# Patient Record
Sex: FEMALE | Race: WHITE | NOT HISPANIC OR LATINO | Employment: OTHER | URBAN - NONMETROPOLITAN AREA
[De-identification: names, ages, dates, MRNs, and addresses within clinical notes are randomized per-mention and may not be internally consistent; named-entity substitution may affect disease eponyms.]

---

## 2017-04-19 ENCOUNTER — TELEPHONE (OUTPATIENT)
Dept: INTERNAL MEDICINE | Facility: CLINIC | Age: 67
End: 2017-04-19

## 2017-04-19 RX ORDER — METOPROLOL SUCCINATE 100 MG/1
100 TABLET, EXTENDED RELEASE ORAL DAILY
Qty: 30 TABLET | Refills: 0 | Status: SHIPPED | OUTPATIENT
Start: 2017-04-19 | End: 2017-04-28 | Stop reason: SDUPTHER

## 2017-04-19 NOTE — TELEPHONE ENCOUNTER
----- Message from Sadie Watts sent at 4/19/2017  9:29 AM EDT -----  Contact: PT  PT CXLD HER APPT TODAY & RESCHEDULED FOR 05-16. SHE HAS ENOUGH TOPOROL  MG FOR April BUT NEEDS 16 TABS FOR MAY.    RX [facility]

## 2017-04-28 ENCOUNTER — TELEPHONE (OUTPATIENT)
Dept: INTERNAL MEDICINE | Facility: CLINIC | Age: 67
End: 2017-04-28

## 2017-04-28 RX ORDER — METOPROLOL SUCCINATE 100 MG/1
100 TABLET, EXTENDED RELEASE ORAL DAILY
Qty: 30 TABLET | Refills: 1 | Status: SHIPPED | OUTPATIENT
Start: 2017-04-28 | End: 2017-06-29 | Stop reason: SDUPTHER

## 2017-05-16 ENCOUNTER — OFFICE VISIT (OUTPATIENT)
Dept: INTERNAL MEDICINE | Facility: CLINIC | Age: 67
End: 2017-05-16

## 2017-05-16 VITALS
TEMPERATURE: 98.9 F | BODY MASS INDEX: 40.75 KG/M2 | HEART RATE: 70 BPM | WEIGHT: 230 LBS | HEIGHT: 63 IN | OXYGEN SATURATION: 97 %

## 2017-05-16 DIAGNOSIS — I10 ESSENTIAL HYPERTENSION: Primary | ICD-10-CM

## 2017-05-16 DIAGNOSIS — F41.8 ANXIETY ASSOCIATED WITH DEPRESSION: ICD-10-CM

## 2017-05-16 PROCEDURE — 99214 OFFICE O/P EST MOD 30 MIN: CPT | Performed by: INTERNAL MEDICINE

## 2017-05-16 RX ORDER — LOSARTAN POTASSIUM 50 MG/1
50 TABLET ORAL DAILY
Qty: 30 TABLET | Refills: 5 | Status: SHIPPED | OUTPATIENT
Start: 2017-05-16 | End: 2017-11-14 | Stop reason: SDUPTHER

## 2017-05-16 RX ORDER — BUSPIRONE HYDROCHLORIDE 15 MG/1
15 TABLET ORAL 3 TIMES DAILY
Qty: 90 TABLET | Refills: 2 | Status: SHIPPED | OUTPATIENT
Start: 2017-05-16 | End: 2017-07-05

## 2017-06-29 ENCOUNTER — TELEPHONE (OUTPATIENT)
Dept: INTERNAL MEDICINE | Facility: CLINIC | Age: 67
End: 2017-06-29

## 2017-06-29 RX ORDER — METOPROLOL SUCCINATE 100 MG/1
100 TABLET, EXTENDED RELEASE ORAL DAILY
Qty: 30 TABLET | Refills: 5 | Status: SHIPPED | OUTPATIENT
Start: 2017-06-29 | End: 2018-01-27 | Stop reason: SDUPTHER

## 2017-07-05 ENCOUNTER — OFFICE VISIT (OUTPATIENT)
Dept: INTERNAL MEDICINE | Facility: CLINIC | Age: 67
End: 2017-07-05

## 2017-07-05 VITALS
DIASTOLIC BLOOD PRESSURE: 108 MMHG | BODY MASS INDEX: 40.66 KG/M2 | HEART RATE: 81 BPM | WEIGHT: 229.5 LBS | OXYGEN SATURATION: 96 % | SYSTOLIC BLOOD PRESSURE: 176 MMHG | TEMPERATURE: 98.9 F | HEIGHT: 63 IN

## 2017-07-05 DIAGNOSIS — F41.8 ANXIETY ASSOCIATED WITH DEPRESSION: ICD-10-CM

## 2017-07-05 DIAGNOSIS — I10 ESSENTIAL HYPERTENSION: Primary | ICD-10-CM

## 2017-07-05 PROCEDURE — 99213 OFFICE O/P EST LOW 20 MIN: CPT | Performed by: INTERNAL MEDICINE

## 2017-07-05 RX ORDER — HYDROXYZINE HYDROCHLORIDE 25 MG/1
25 TABLET, FILM COATED ORAL EVERY 8 HOURS PRN
Qty: 60 TABLET | Refills: 3 | Status: SHIPPED | OUTPATIENT
Start: 2017-07-05 | End: 2018-09-24 | Stop reason: SDUPTHER

## 2017-07-05 NOTE — PROGRESS NOTES
"Chief Complaint   Patient presents with   • Follow-up     1 month for HTN. Pt states taking Buspar daily makes her mind race and makes her mad at times. Pt also states her head itches.      Subjective   Crystal Ragsdale is a 66 y.o. female.     HPI Comments: Here for followup of HTN and anxiety.  Pt complains that the buspar causes anger and makes her mind race more.   It does help decrease her anxiety.   She has good and bad nights for sleep.   She has seen counselor several times, has appt in August again.   She does not check her BP regularly, makes her too nervous.   She denies HA, vision changes, CP, SOB, edema.  No palpitations.   Has been having some gas problems.   Has been taking both BP meds regularly.  CBC and CMP were normal in March at oncology appt.        The following portions of the patient's history were reviewed and updated as appropriate: allergies, current medications, past family history, past medical history, past social history, past surgical history and problem list.    Review of Systems   Eyes: Negative for visual disturbance.   Respiratory: Negative for shortness of breath.    Cardiovascular: Negative for chest pain, palpitations and leg swelling.   Skin: Negative for rash.   Allergic/Immunologic: Negative for environmental allergies.   Psychiatric/Behavioral: Negative for dysphoric mood and sleep disturbance. The patient is nervous/anxious.    All other systems reviewed and are negative.      Objective   BP (!) 176/108  Pulse 81  Temp 98.9 °F (37.2 °C)  Ht 63\" (160 cm)  Wt 229 lb 8 oz (104 kg)  SpO2 96%  BMI 40.65 kg/m2  Body mass index is 40.65 kg/(m^2).  Physical Exam   Constitutional: She is oriented to person, place, and time. She appears well-developed and well-nourished.   Pleasant female, morbidly obese, appears anxious but in no distress   HENT:   Head: Normocephalic and atraumatic.   Mouth/Throat: Oropharynx is clear and moist.   Eyes: Conjunctivae and EOM are normal. Pupils are " equal, round, and reactive to light.   Neck: Normal range of motion. Neck supple. No thyromegaly present.   Cardiovascular: Normal rate, regular rhythm, normal heart sounds and intact distal pulses.    No murmur heard.  Pulmonary/Chest: Effort normal and breath sounds normal. No respiratory distress.   Abdominal: Soft. Bowel sounds are normal.   Musculoskeletal: She exhibits no edema.   Lymphadenopathy:     She has no cervical adenopathy.   Neurological: She is alert and oriented to person, place, and time. No cranial nerve deficit.   Skin: No rash noted.   Psychiatric: Judgment normal.   Patient appears anxious, does not appear depressed   Nursing note and vitals reviewed.      Assessment/Plan   Crystal Ragsdale is here today and the following problems have been addressed:      Crystal was seen today for follow-up.    Diagnoses and all orders for this visit:    Essential hypertension    Anxiety associated with depression    Other orders  -     hydrOXYzine (ATARAX) 25 MG tablet; Take 1 tablet by mouth Every 8 (Eight) Hours As Needed for Itching or Anxiety.    Follow heart healthy/low salt diet  Avoid processed foods  Monitor blood pressure as discussed-encouraged her to try to check blood pressure on occasion  Exercise as tolerated up to 30 minutes 5 days per week  Take all medications as prescribed  Given hydroxyzine for anxiety and sleep  Discontinued buspar due to side effect  Encouraged pt to see counselor as scheduled    RTC 3 mo, labs then  Please note that portions of this note were completed with a voice recognition program.  Efforts were made to edit dictation, but occasionally words are mistranscribed.

## 2017-09-25 ENCOUNTER — OFFICE VISIT (OUTPATIENT)
Dept: PSYCHIATRY | Facility: CLINIC | Age: 67
End: 2017-09-25

## 2017-09-25 DIAGNOSIS — F41.1 GENERALIZED ANXIETY DISORDER: Primary | ICD-10-CM

## 2017-09-25 DIAGNOSIS — R45.89 DYSPHORIC MOOD: ICD-10-CM

## 2017-09-25 PROCEDURE — 90834 PSYTX W PT 45 MINUTES: CPT | Performed by: SOCIAL WORKER
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
## 2017-10-05 ENCOUNTER — OFFICE VISIT (OUTPATIENT)
Dept: INTERNAL MEDICINE | Facility: CLINIC | Age: 67
End: 2017-10-05

## 2017-10-05 VITALS
BODY MASS INDEX: 40.4 KG/M2 | OXYGEN SATURATION: 97 % | HEART RATE: 72 BPM | TEMPERATURE: 98.8 F | WEIGHT: 228 LBS | HEIGHT: 63 IN | DIASTOLIC BLOOD PRESSURE: 102 MMHG | SYSTOLIC BLOOD PRESSURE: 178 MMHG

## 2017-10-05 DIAGNOSIS — F41.8 ANXIETY ASSOCIATED WITH DEPRESSION: ICD-10-CM

## 2017-10-05 DIAGNOSIS — I10 ESSENTIAL HYPERTENSION: Primary | ICD-10-CM

## 2017-10-05 PROCEDURE — 99214 OFFICE O/P EST MOD 30 MIN: CPT | Performed by: INTERNAL MEDICINE

## 2017-10-05 NOTE — PROGRESS NOTES
"Chief Complaint   Patient presents with   • Follow-up     3 months for HTN, anxiety, and depression. No new complaints.      Subjective   Crystal Ragsdale is a 67 y.o. female.     HPI Comments: Here for followup of HTN and anxiety.  Was given hydroxyzine last visit to use prn for anxiety.  She says it makes her sleepy, but it did not help her sleep at night?  She has been seeing the counselor.  They want her to get involved with other people.   She denies chest pain, shortness of breath or edema.  States she is compliant with blood pressure medication.  She does not want to take a daily medication for her chronic underlying severe anxiety disorder.             The following portions of the patient's history were reviewed and updated as appropriate: allergies, current medications, past family history, past medical history, past social history, past surgical history and problem list.    Review of Systems   Respiratory: Negative for shortness of breath.    Cardiovascular: Negative for chest pain, palpitations and leg swelling.   Psychiatric/Behavioral: The patient is nervous/anxious.    All other systems reviewed and are negative.      Objective   BP (!) 178/102  Pulse 72  Temp 98.8 °F (37.1 °C)  Ht 63\" (160 cm)  Wt 228 lb (103 kg)  SpO2 97%  BMI 40.39 kg/m2  Body mass index is 40.39 kg/(m^2).  Physical Exam   Constitutional: She is oriented to person, place, and time. She appears well-developed and well-nourished.   Very anxious female, no apparent distress, morbidly obese   HENT:   Head: Normocephalic and atraumatic.   Mouth/Throat: Oropharynx is clear and moist.   Eyes: Conjunctivae and EOM are normal. Pupils are equal, round, and reactive to light.   Neck: Normal range of motion. Neck supple. No thyromegaly present.   Cardiovascular: Normal rate, regular rhythm, normal heart sounds and intact distal pulses.    No murmur heard.  Pulmonary/Chest: Effort normal and breath sounds normal. No respiratory distress. "   Musculoskeletal: She exhibits no edema.   Lymphadenopathy:     She has no cervical adenopathy.   Neurological: She is alert and oriented to person, place, and time. No cranial nerve deficit.   Psychiatric: Judgment normal.   Rapid speech, anxious   Nursing note and vitals reviewed.      Assessment/Plan   Crystal Ragsdale is here today and the following problems have been addressed:      Crystal was seen today for follow-up.    Diagnoses and all orders for this visit:    Essential hypertension  -     CBC & Differential  -     Comprehensive Metabolic Panel  -     Lipid Panel    Anxiety associated with depression  -     Cancel: T4, Free  -     Cancel: TSH    Other orders  -     CloNIDine (CATAPRES-TTS) 0.1 MG/24HR patch; Place 1 patch on the skin 1 (One) Time Per Week.    Follow heart healthy/low salt diet  Avoid processed foods  Monitor blood pressure as discussed  Exercise as tolerated up to 30 minutes 5 days per week  Take all medications as prescribed  Start catapress-1 patch and change q week, hopefully this will help blood pressure and anxiety  Labs as noted  Continue prn hydroxyzine for sleep and/or anxiety  Continue regular counseling for anxiety    RTC 2 weeks    Please note that portions of this note were completed with a voice recognition program.  Efforts were made to edit dictation, but occasionally words are mistranscribed.

## 2017-10-19 ENCOUNTER — OFFICE VISIT (OUTPATIENT)
Dept: INTERNAL MEDICINE | Facility: CLINIC | Age: 67
End: 2017-10-19

## 2017-10-19 VITALS
HEART RATE: 78 BPM | SYSTOLIC BLOOD PRESSURE: 168 MMHG | OXYGEN SATURATION: 96 % | TEMPERATURE: 98.4 F | DIASTOLIC BLOOD PRESSURE: 100 MMHG

## 2017-10-19 DIAGNOSIS — I10 ESSENTIAL HYPERTENSION: Primary | ICD-10-CM

## 2017-10-19 DIAGNOSIS — F41.8 ANXIETY ASSOCIATED WITH DEPRESSION: ICD-10-CM

## 2017-10-19 PROCEDURE — 99213 OFFICE O/P EST LOW 20 MIN: CPT | Performed by: INTERNAL MEDICINE

## 2017-10-19 NOTE — PROGRESS NOTES
Chief Complaint   Patient presents with   • Follow-up     2 weeks for anxiety and HTN.      Subjective   Crystal Ragsdale is a 67 y.o. female.     HPI Comments: Here for followup of HTN and anxiety.   Last visit we started a catapress-1 patch in hopes it would help her BP and anxiety.   Her BP remains elevated today. She is having a hard time keeping patch on, falls off when wet.   It is helping her anxiety.  Even family and friends have noticed a difference in her anxiety.  She has not checked her BP at home, it still makes her too anxious.  She denies any chest pain or shortness of breath.  No edema.       The following portions of the patient's history were reviewed and updated as appropriate: allergies, current medications, past family history, past medical history, past social history, past surgical history and problem list.    Review of Systems   Respiratory: Negative for shortness of breath.    Cardiovascular: Negative for chest pain, palpitations and leg swelling.   Psychiatric/Behavioral:        Less anxiety       Objective   /100  Pulse 78  Temp 98.4 °F (36.9 °C)  SpO2 96%  There is no height or weight on file to calculate BMI.  Physical Exam   Constitutional: She is oriented to person, place, and time. She appears well-developed and well-nourished.   HENT:   Head: Normocephalic and atraumatic.   Cardiovascular: Normal rate, regular rhythm and normal heart sounds.    No murmur heard.  Pulmonary/Chest: Effort normal and breath sounds normal.   Neurological: She is alert and oriented to person, place, and time.   Psychiatric: She has a normal mood and affect. Judgment normal.   Nursing note and vitals reviewed.      Assessment/Plan   Crystal Ragsdale is here today and the following problems have been addressed:      Crystal was seen today for follow-up.    Diagnoses and all orders for this visit:    Essential hypertension    Anxiety associated with depression    Follow heart healthy/low salt diet  Avoid  processed foods  Monitor blood pressure on occasion  Exercise as tolerated up to 30 minutes 5 days per week  Take all medications as prescribed  She is feeling much better with catapress-1 patch, although BP not yet down.  May consider increase in patch next visit if BP not yet decreased  Her anxiety is much improved    RTC one mo    Please note that portions of this note were completed with a voice recognition program.  Efforts were made to edit dictation, but occasionally words are mistranscribed.

## 2017-11-02 LAB
ALBUMIN SERPL-MCNC: 3.8 G/DL (ref 3.5–5)
ALBUMIN/GLOB SERPL: 1.3 G/DL (ref 1–2)
ALP SERPL-CCNC: 110 U/L (ref 38–126)
ALT SERPL-CCNC: 21 U/L (ref 13–69)
AST SERPL-CCNC: 17 U/L (ref 15–46)
BASOPHILS # BLD AUTO: 0.04 10*3/MM3 (ref 0–0.2)
BASOPHILS NFR BLD AUTO: 0.5 % (ref 0–2.5)
BILIRUB SERPL-MCNC: 0.5 MG/DL (ref 0.2–1.3)
BUN SERPL-MCNC: 12 MG/DL (ref 7–20)
BUN/CREAT SERPL: 17.1 (ref 7.1–23.5)
CALCIUM SERPL-MCNC: 9.3 MG/DL (ref 8.4–10.2)
CHLORIDE SERPL-SCNC: 104 MMOL/L (ref 98–107)
CHOLEST SERPL-MCNC: 130 MG/DL (ref 0–199)
CO2 SERPL-SCNC: 27 MMOL/L (ref 26–30)
CREAT SERPL-MCNC: 0.7 MG/DL (ref 0.6–1.3)
EOSINOPHIL # BLD AUTO: 0.03 10*3/MM3 (ref 0–0.7)
EOSINOPHIL NFR BLD AUTO: 0.4 % (ref 0–7)
ERYTHROCYTE [DISTWIDTH] IN BLOOD BY AUTOMATED COUNT: 13.1 % (ref 11.5–14.5)
GFR SERPLBLD CREATININE-BSD FMLA CKD-EPI: 101 ML/MIN/1.73
GFR SERPLBLD CREATININE-BSD FMLA CKD-EPI: 84 ML/MIN/1.73
GLOBULIN SER CALC-MCNC: 3 GM/DL
GLUCOSE SERPL-MCNC: 118 MG/DL (ref 74–98)
HCT VFR BLD AUTO: 46.7 % (ref 37–47)
HDLC SERPL-MCNC: 33 MG/DL (ref 40–60)
HGB BLD-MCNC: 16.2 G/DL (ref 12–16)
IMM GRANULOCYTES # BLD: 0.01 10*3/MM3 (ref 0–0.06)
IMM GRANULOCYTES NFR BLD: 0.1 % (ref 0–0.6)
LDLC SERPL CALC-MCNC: 75 MG/DL (ref 0–99)
LYMPHOCYTES # BLD AUTO: 2.6 10*3/MM3 (ref 0.6–3.4)
LYMPHOCYTES NFR BLD AUTO: 35.4 % (ref 10–50)
MCH RBC QN AUTO: 32.1 PG (ref 27–31)
MCHC RBC AUTO-ENTMCNC: 34.7 G/DL (ref 30–37)
MCV RBC AUTO: 92.5 FL (ref 81–99)
MONOCYTES # BLD AUTO: 0.41 10*3/MM3 (ref 0–0.9)
MONOCYTES NFR BLD AUTO: 5.6 % (ref 0–12)
NEUTROPHILS # BLD AUTO: 4.26 10*3/MM3 (ref 2–6.9)
NEUTROPHILS NFR BLD AUTO: 58 % (ref 37–80)
NRBC BLD AUTO-RTO: 0 /100 WBC (ref 0–0)
PLATELET # BLD AUTO: 273 10*3/MM3 (ref 130–400)
POTASSIUM SERPL-SCNC: 3.9 MMOL/L (ref 3.5–5.1)
PROT SERPL-MCNC: 6.8 G/DL (ref 6.3–8.2)
RBC # BLD AUTO: 5.05 10*6/MM3 (ref 4.2–5.4)
SODIUM SERPL-SCNC: 142 MMOL/L (ref 137–145)
TRIGL SERPL-MCNC: 109 MG/DL
VLDLC SERPL CALC-MCNC: 21.8 MG/DL
WBC # BLD AUTO: 7.35 10*3/MM3 (ref 4.8–10.8)

## 2017-11-06 ENCOUNTER — TELEPHONE (OUTPATIENT)
Dept: INTERNAL MEDICINE | Facility: CLINIC | Age: 67
End: 2017-11-06

## 2017-11-06 NOTE — TELEPHONE ENCOUNTER
----- Message from Marilyn K Vermeesch, MD sent at 11/6/2017  7:36 AM EST -----  Please tell patient that all of her labs look good except an elevated blood sugar.  Please tell her I did a diabetic test and it was elevated at 5.9 suggesting an average blood sugar of 120.  Normal blood sugar is less than 100.  This places her in t  he range of prediabetes.  Please tell her that it is important to avoid sweets, pop, and decrease the amount of bread, pasta, rice and potatoes she is eating.  It is important that she try to do exercise for 30 minutes 5 days a week such as walking.    If she can work on weight loss this would be helpful.  Please give her the information for the health department prediabetes or diabetes classes.  We will repeat these labs in approximately 4 months.

## 2017-11-08 ENCOUNTER — OFFICE VISIT (OUTPATIENT)
Dept: PSYCHIATRY | Facility: CLINIC | Age: 67
End: 2017-11-08

## 2017-11-08 DIAGNOSIS — R45.89 DYSPHORIC MOOD: ICD-10-CM

## 2017-11-08 DIAGNOSIS — F41.1 GENERALIZED ANXIETY DISORDER: Primary | ICD-10-CM

## 2017-11-08 LAB
HBA1C MFR BLD: 5.9 %
WRITTEN AUTHORIZATION: NORMAL

## 2017-11-08 PROCEDURE — 90834 PSYTX W PT 45 MINUTES: CPT | Performed by: SOCIAL WORKER

## 2017-11-08 NOTE — PROGRESS NOTES
"Date of Service: 11/8/17  Time In: 145  Time Out: 230      PROGRESS NOTE  Data:  Crystal Ragsdale is a 67 y.o. female who met with the undersigned for a regularly scheduled individual outpatient psychotherapy session at  [facility]. Patient started individual psychotherapy session by reporting \"my sugar is bad\". patient discussed her doctor has changed her diet to \"no pop, no potatoes, no white anything and no bread\". Patient discussed the difficulties and states \"I normally drink coffee and then i drink a soda every morning\". Patient states \"I drink pepsi\". Patient discussed she loves breads and it will be difficult to not have white sugar. patient states \"im on blood pressure medicine\" and \"it makes me so tired all the time\". Patient discussed she got have a weekend with her 1st cousins and throuoghly enjoyed it. Patient states \"sometimes i get jealous of people\". Patient discussed she wishes she had someone to be with i.e. Relationship. Patient discussed her childhood after this therapist asked her many questions about her low self esteem and lack of confidence.     HPI: Depression, anxiety,      Clinical Maneuvering/Intervention:  Assisted patient in processing above session content; acknowledged and normalized patient’s thoughts, feelings, and concerns. Assisted patient in processing her feelings of continued anxiety and feeling overwhelmed.  Encouraged pt the importance of keeping all appointments and taking medications as prescribed and calling with any questions or concerns but patient explained having difficulty finding medication that doesn't cause negative side effects.  Assisted patient in understanding the importance of seeing the interdisciplinary team for continuity of care.  Patient complains of side effects of a blood pressure pill. Patient to discuss with her PCP.  Patient to work on relaxation skills and techniques. Patient denies si and hi. Assisted patient in " processing her lack of confidence, low self esteem and people pleasing. Encouraged patient to work on not putting expectation on others and trying to not focus on what others try to put on her. Encouraged patient to focus on what makes her happy and self love. Educated patient to work on gratitude and finding things she likes about herself everyday and saying them to herself as a way to build her esteem. Patient is agreeable.       Allowed patient to freely discuss issues without interruption or judgment. Provided safe, confidential environment to facilitate the development of positive therapeutic relationship and encourage open, honest communication. Assisted patient in identifying risk factors which would indicate the need for higher level of care including thoughts to harm self or others and/or self-harming behavior and encouraged patient to contact this office, call 911, or present to the nearest emergency room should any of these events occur. Discussed crisis intervention services and means to access.  Patient adamantly and convincingly denies current suicidal or homicidal ideation or perceptual disturbance.    Assessment     Diagnoses and all orders for this visit:    Generalized anxiety disorder    Dysphoric mood             Mental Status Exam  Hygiene:  good  Dress:  casual  Attitude:  Guarded  Motor Activity:  Restless  Speech:  Rapid  Mood:  anxious  Affect:  depressed and anxious  Thought Processes:  Goal directed  Thought Content:  normal  Suicidal Thoughts:  denies  Homicidal Thoughts:  denies  Crisis Safety Plan: yes, to come to the emergency room.  Hallucinations:  denies    Patient's Support Network Includes:  extended family    Progress toward goal: Not at goal    Functional Status: Moderate impairment     Prognosis: Guarded with Ongoing Treatment    Plan         Patient will adhere to medication regimen as prescribed and report any side effects. Patient will contact this office, call 911 or  present to the nearest emergency room should suicidal or homicidal ideations occur. Provide Cognitive Behavioral Therapy and Integrative Therapy to improve functioning, maintain stability, and avoid decompensation and the need for higher level of care.          Return in about 1 month (around 12/8/2017), or if symptoms worsen or fail to improve.      This document signed by Jennifer Matamoros LCSW, November 8, 2017 5:19 PM

## 2017-11-14 RX ORDER — LOSARTAN POTASSIUM 50 MG/1
TABLET ORAL
Qty: 30 TABLET | Refills: 5 | Status: SHIPPED | OUTPATIENT
Start: 2017-11-14 | End: 2018-04-25 | Stop reason: SDUPTHER

## 2017-12-05 ENCOUNTER — OFFICE VISIT (OUTPATIENT)
Dept: INTERNAL MEDICINE | Facility: CLINIC | Age: 67
End: 2017-12-05

## 2017-12-05 VITALS
DIASTOLIC BLOOD PRESSURE: 90 MMHG | OXYGEN SATURATION: 95 % | WEIGHT: 228 LBS | TEMPERATURE: 98.6 F | HEART RATE: 70 BPM | BODY MASS INDEX: 40.4 KG/M2 | HEIGHT: 63 IN | SYSTOLIC BLOOD PRESSURE: 152 MMHG

## 2017-12-05 DIAGNOSIS — F41.8 ANXIETY ASSOCIATED WITH DEPRESSION: ICD-10-CM

## 2017-12-05 DIAGNOSIS — R73.9 HYPERGLYCEMIA: ICD-10-CM

## 2017-12-05 DIAGNOSIS — I10 ESSENTIAL HYPERTENSION: Primary | ICD-10-CM

## 2017-12-05 PROCEDURE — 99213 OFFICE O/P EST LOW 20 MIN: CPT | Performed by: INTERNAL MEDICINE

## 2017-12-05 NOTE — PROGRESS NOTES
"Chief Complaint   Patient presents with   • Follow-up     2 months for depression and HTN.      Subjective   Crystal Ragsdale is a 67 y.o. female.     HPI Comments: Here for followup of anxiety and HTN.   Her BP is slowly improving with the catapres patch.  No CP, SOB, edema or palpitations.  She does fatigue easily.   Her anxiety is doing better overall.  She is seeing her counselor once a month.  It has been helpful.  She does get agitated easily at times.   She has cut back on the amount of hydroxyzine she is using for anxiety.    She is working on her BS due to elevated A1c of 5.9.  Has cut back on how much sugar she is using in her tea.  Has changed to wheat bread and sweet potatoes.    She is trying to walk more often.  Her wt is unchanged.     Overall she has no complaints today and feels much better with use of clonidine patch.           The following portions of the patient's history were reviewed and updated as appropriate: allergies, current medications, past family history, past medical history, past social history, past surgical history and problem list.    Review of Systems   Constitutional: Positive for fatigue.   Respiratory: Negative for shortness of breath.    Cardiovascular: Negative for chest pain, palpitations and leg swelling.   Psychiatric/Behavioral: Positive for agitation. The patient is nervous/anxious.    All other systems reviewed and are negative.      Objective   /90  Pulse 70  Temp 98.6 °F (37 °C)  Ht 160 cm (63\")  Wt 103 kg (228 lb)  SpO2 95%  BMI 40.39 kg/m2  Body mass index is 40.39 kg/(m^2).  Physical Exam   Constitutional: She is oriented to person, place, and time. She appears well-developed and well-nourished.   HENT:   Head: Normocephalic and atraumatic.   Mouth/Throat: Oropharynx is clear and moist.   Eyes: Conjunctivae and EOM are normal. Pupils are equal, round, and reactive to light.   Neck: Normal range of motion. Neck supple. No thyromegaly present. "   Cardiovascular: Normal rate, regular rhythm, normal heart sounds and intact distal pulses.    No murmur heard.  Pulmonary/Chest: Effort normal and breath sounds normal. No respiratory distress.   Musculoskeletal: She exhibits no edema.   Lymphadenopathy:     She has no cervical adenopathy.   Neurological: She is alert and oriented to person, place, and time. No cranial nerve deficit.   Psychiatric: She has a normal mood and affect. Judgment normal.   Nursing note and vitals reviewed.      Assessment/Plan   Crystal Ragsdale is here today and the following problems have been addressed:      Crystal was seen today for follow-up.    Diagnoses and all orders for this visit:    Essential hypertension    Anxiety associated with depression    Hyperglycemia    Other orders  -     CloNIDine (CATAPRES-TTS-2) 0.2 MG/24HR patch; Place 1 patch on the skin 1 (One) Time Per Week    Follow heart healthy/low salt diet  Avoid processed foods  Monitor blood pressure as discussed  Exercise as tolerated up to 30 minutes 5 days per week  Take all medications as prescribed  Increase catapres patch to TTS-2 and change once a week  Continue diabetic diet, decrease portion sizes, increase exercise    RTC 2 mo, labs then    Please note that portions of this note were completed with a voice recognition program.  Efforts were made to edit dictation, but occasionally words are mistranscribed.

## 2017-12-22 ENCOUNTER — TELEPHONE (OUTPATIENT)
Dept: INTERNAL MEDICINE | Facility: CLINIC | Age: 67
End: 2017-12-22

## 2017-12-22 NOTE — TELEPHONE ENCOUNTER
Apply hydrocortisone 1% cream to skin rub in well wait 15-20 minutes and then apply blood pressure patch.

## 2018-01-29 RX ORDER — METOPROLOL SUCCINATE 100 MG/1
TABLET, EXTENDED RELEASE ORAL
Qty: 30 TABLET | Refills: 5 | Status: SHIPPED | OUTPATIENT
Start: 2018-01-29 | End: 2018-02-15 | Stop reason: SDUPTHER

## 2018-01-29 RX ORDER — METOPROLOL SUCCINATE 100 MG/1
100 TABLET, EXTENDED RELEASE ORAL DAILY
Qty: 30 TABLET | Refills: 5 | Status: SHIPPED | OUTPATIENT
Start: 2018-01-29 | End: 2018-02-15

## 2018-02-15 ENCOUNTER — OFFICE VISIT (OUTPATIENT)
Dept: INTERNAL MEDICINE | Facility: CLINIC | Age: 68
End: 2018-02-15

## 2018-02-15 VITALS
DIASTOLIC BLOOD PRESSURE: 88 MMHG | HEIGHT: 63 IN | HEART RATE: 79 BPM | WEIGHT: 225 LBS | OXYGEN SATURATION: 95 % | BODY MASS INDEX: 39.87 KG/M2 | TEMPERATURE: 98.9 F | SYSTOLIC BLOOD PRESSURE: 150 MMHG

## 2018-02-15 DIAGNOSIS — I10 ESSENTIAL HYPERTENSION: Primary | ICD-10-CM

## 2018-02-15 DIAGNOSIS — F41.8 ANXIETY ASSOCIATED WITH DEPRESSION: ICD-10-CM

## 2018-02-15 DIAGNOSIS — R73.9 HYPERGLYCEMIA: ICD-10-CM

## 2018-02-15 PROCEDURE — 99213 OFFICE O/P EST LOW 20 MIN: CPT | Performed by: INTERNAL MEDICINE

## 2018-02-15 RX ORDER — CARVEDILOL 25 MG/1
25 TABLET ORAL 2 TIMES DAILY WITH MEALS
Qty: 60 TABLET | Refills: 11 | Status: SHIPPED | OUTPATIENT
Start: 2018-02-15 | End: 2018-12-28 | Stop reason: SDUPTHER

## 2018-02-15 NOTE — PROGRESS NOTES
"Chief Complaint   Patient presents with   • Follow-up     2 months for HTN and anxiety with depression. No new complaints.      Subjective   Crystal Ragsdale is a 67 y.o. female.     HPI Comments: Here for follow up of HTN, HLD, depression/anxiety and preDM.  Last visit we increased her clonidine patch to a .2mg weekly dose.   Her BP is unchanged today.  She is too anxious to check it at home.  She missed last few months of counseling due to cancelations by counselor.   Has been more anxious lately.  She tries to get out more as this is helpful.   Has cut back on her carbs and sweets.  Her wt is down another few lbs.  Not exercising much.  Has not been to eye doctor in several yrs.  She denies NTB of feet.   No CP, SOB, edema or edema.   She has an appt with Dr Hare next month.  Her mammogram will be done just before that.        The following portions of the patient's history were reviewed and updated as appropriate: allergies, current medications, past family history, past medical history, past social history, past surgical history and problem list.    Review of Systems   Respiratory: Negative for shortness of breath.    Cardiovascular: Negative for chest pain, palpitations and leg swelling.   Psychiatric/Behavioral: Negative for dysphoric mood and sleep disturbance. The patient is nervous/anxious.    All other systems reviewed and are negative.      Objective   /88  Pulse 79  Temp 98.9 °F (37.2 °C)  Ht 160 cm (63\")  Wt 102 kg (225 lb)  SpO2 95%  BMI 39.86 kg/m2  Body mass index is 39.86 kg/(m^2).  Physical Exam   Constitutional: She is oriented to person, place, and time. She appears well-developed and well-nourished.   HENT:   Head: Normocephalic and atraumatic.   Mouth/Throat: Oropharynx is clear and moist.   Eyes: Conjunctivae and EOM are normal. Pupils are equal, round, and reactive to light.   Neck: Normal range of motion. Neck supple. No thyromegaly present.   Cardiovascular: Normal rate, " regular rhythm, normal heart sounds and intact distal pulses.    No murmur heard.  Pulmonary/Chest: Effort normal and breath sounds normal. No respiratory distress.   Musculoskeletal: She exhibits no edema.   Lymphadenopathy:     She has no cervical adenopathy.   Neurological: She is alert and oriented to person, place, and time. No cranial nerve deficit.   Psychiatric: Judgment normal.   Slightly anxious today   Nursing note and vitals reviewed.      Assessment/Plan   Crystal Ragsdale is here today and the following problems have been addressed:      Crystal was seen today for follow-up.    Diagnoses and all orders for this visit:    Essential hypertension  -     Basic Metabolic Panel    Anxiety associated with depression    Hyperglycemia  -     Basic Metabolic Panel  -     Hemoglobin A1c    Other orders  -     carvedilol (COREG) 25 MG tablet; Take 1 tablet by mouth 2 (Two) Times a Day With Meals.    Follow heart healthy/low salt/diabetic diet  Avoid processed foods  Monitor blood pressure as discussed  Exercise as tolerated up to 30 minutes 5 days per week  Take all medications as prescribed  Labs as noted  Discontinue metoprolol and change to coreg 25 mg BID  Continue to see counselor    RTC 2 mo to follow up BP    Please note that portions of this note were completed with a voice recognition program.  Efforts were made to edit dictation, but occasionally words are mistranscribed.

## 2018-02-16 LAB
BUN SERPL-MCNC: 13 MG/DL (ref 7–20)
BUN/CREAT SERPL: 18.6 (ref 7.1–23.5)
CALCIUM SERPL-MCNC: 10 MG/DL (ref 8.4–10.2)
CHLORIDE SERPL-SCNC: 105 MMOL/L (ref 98–107)
CO2 SERPL-SCNC: 27 MMOL/L (ref 26–30)
CREAT SERPL-MCNC: 0.7 MG/DL (ref 0.6–1.3)
GFR SERPLBLD CREATININE-BSD FMLA CKD-EPI: 101 ML/MIN/1.73
GFR SERPLBLD CREATININE-BSD FMLA CKD-EPI: 84 ML/MIN/1.73
GLUCOSE SERPL-MCNC: 120 MG/DL (ref 74–98)
HBA1C MFR BLD: 5.8 %
POTASSIUM SERPL-SCNC: 3.9 MMOL/L (ref 3.5–5.1)
SODIUM SERPL-SCNC: 146 MMOL/L (ref 137–145)

## 2018-02-19 ENCOUNTER — OFFICE VISIT (OUTPATIENT)
Dept: PSYCHIATRY | Facility: CLINIC | Age: 68
End: 2018-02-19

## 2018-02-19 DIAGNOSIS — R45.89 DYSPHORIC MOOD: ICD-10-CM

## 2018-02-19 DIAGNOSIS — F41.1 GENERALIZED ANXIETY DISORDER: Primary | ICD-10-CM

## 2018-02-19 PROCEDURE — 90834 PSYTX W PT 45 MINUTES: CPT | Performed by: SOCIAL WORKER

## 2018-04-25 RX ORDER — LOSARTAN POTASSIUM 50 MG/1
50 TABLET ORAL DAILY
Qty: 30 TABLET | Refills: 5 | Status: SHIPPED | OUTPATIENT
Start: 2018-04-25 | End: 2018-11-11 | Stop reason: SDUPTHER

## 2018-04-30 ENCOUNTER — OFFICE VISIT (OUTPATIENT)
Dept: PSYCHIATRY | Facility: CLINIC | Age: 68
End: 2018-04-30

## 2018-04-30 DIAGNOSIS — F41.1 GENERALIZED ANXIETY DISORDER: Primary | ICD-10-CM

## 2018-04-30 DIAGNOSIS — R45.89 DYSPHORIC MOOD: ICD-10-CM

## 2018-04-30 PROCEDURE — 90834 PSYTX W PT 45 MINUTES: CPT | Performed by: SOCIAL WORKER

## 2018-05-01 NOTE — PROGRESS NOTES
"Date of Service: 4/30/18  Time In: 345  Time Out: 430      PROGRESS NOTE  Data:  Crystal Ragsdale is a 67 y.o. female who met with the undersigned for a regularly scheduled individual outpatient psychotherapy session at  [facility]. Patient started individual psychotherapy session by reporting \"well I'm still alone\".  Patient discussed she is trying to cope with being alone and living alone better.  Patient discussed her grandson stayed a week with her over his spring break.  Patient discussed what they did in the family had during that time.  Patient discussed a side effect of her blood pressure pills being fatigue and lack of energy.  Patient discussed some day she doesn't feel like doing much or gets tired easily.  Patient states \"I joined the [employer] club\".  Patient engage in a discussion about the [employer] group/club that she has joined and having activities to do.  Patient states why does everything have to be revolved around food\".  Patient explains that she has irritable bowel syndrome and is afraid to eat out in public as she may have diarrhea and accidents if she eats something that doesn't settle well with her stomach.  Patient discussed her daughter coming for a visit and it was agreed time with no arguments or uncomfortable conversations which is new for them.  Patient discussed she is working on self-esteem and self-worth.  Patient gave an example of not being invited to her aunt and uncle 65th wedding anniversary and having her feelings hurt that she was not invited.    HPI: Depression, anxiety, anxiety when [place] the [place] and avoids it.     Clinical Maneuvering/Intervention:  Assisted patient in processing above session content; acknowledged and normalized patient’s thoughts, feelings, and concerns. Assisted patient in processing her feelings of continued anxiety. Assisted patient in processing her thoughts and feelings about her spending the " weekend with her daughter and having her grandson here for a week.   Encouraged pt the importance of keeping all appointments and taking medications as prescribed and calling with any questions or concerns but patient explained having difficulty finding medication that doesn't cause negative side effects.  Assisted patient in understanding the importance of seeing the interdisciplinary team for continuity of care.  Patient complains of side effects of a blood pressure pill. Patient to discuss with her PCP.  Patient to work on relaxation skills .   Patient denies si and hi. Assisted patient in processing her lack of confidence, low self esteem and people pleasing and was able to give examples of how she is working on self-esteem and confidence.  Gave encouragement to patient that joined a group as that was one of her goals.  Patient continued to find outings and hobbies.  Educated on irrational thinking and fears and ways to use cognitive behavior therapy to work on them    Allowed patient to freely discuss issues without interruption or judgment. Provided safe, confidential environment to facilitate the development of positive therapeutic relationship and encourage open, honest communication. Assisted patient in identifying risk factors which would indicate the need for higher level of care including thoughts to harm self or others and/or self-harming behavior and encouraged patient to contact this office, call 911, or present to the nearest emergency room should any of these events occur. Discussed crisis intervention services and means to access.  Patient adamantly and convincingly denies current suicidal or homicidal ideation or perceptual disturbance.    Assessment     Diagnoses and all orders for this visit:    Generalized anxiety disorder    Dysphoric mood               Mental Status Exam  Hygiene:  good  Dress:  casual  Attitude:  Guarded  Motor Activity:  Restless  Speech:  Rapid  Mood:  anxious  Affect:   depressed and anxious  Thought Processes:  Goal directed  Thought Content:  normal  Suicidal Thoughts:  denies  Homicidal Thoughts:  denies  Crisis Safety Plan: yes, to come to the emergency room.  Hallucinations:  denies    Patient's Support Network Includes:  extended family    Progress toward goal: Not at goal    Functional Status: Moderate impairment     Prognosis: Guarded with Ongoing Treatment    Plan         Patient will adhere to medication regimen as prescribed and report any side effects. Patient will contact this office, call 911 or present to the nearest emergency room should suicidal or homicidal ideations occur. Provide Cognitive Behavioral Therapy and Integrative Therapy to improve functioning, maintain stability, and avoid decompensation and the need for higher level of care.          Return if symptoms worsen or fail to improve, for Patient prefers as needed visits the therapist encouraged monthly.      This document signed by Jennifer Matamoros LCSW, May 1, 2018 11:27 AM

## 2018-05-31 ENCOUNTER — OFFICE VISIT (OUTPATIENT)
Dept: INTERNAL MEDICINE | Facility: CLINIC | Age: 68
End: 2018-05-31

## 2018-05-31 VITALS
WEIGHT: 225 LBS | HEART RATE: 70 BPM | BODY MASS INDEX: 39.87 KG/M2 | OXYGEN SATURATION: 96 % | DIASTOLIC BLOOD PRESSURE: 80 MMHG | HEIGHT: 63 IN | TEMPERATURE: 98.3 F | SYSTOLIC BLOOD PRESSURE: 138 MMHG

## 2018-05-31 DIAGNOSIS — I10 ESSENTIAL HYPERTENSION: Primary | ICD-10-CM

## 2018-05-31 DIAGNOSIS — F32.A FATIGUE DUE TO DEPRESSION: ICD-10-CM

## 2018-05-31 DIAGNOSIS — R73.9 HYPERGLYCEMIA: ICD-10-CM

## 2018-05-31 DIAGNOSIS — F41.8 ANXIETY ASSOCIATED WITH DEPRESSION: ICD-10-CM

## 2018-05-31 DIAGNOSIS — R53.83 FATIGUE DUE TO DEPRESSION: ICD-10-CM

## 2018-05-31 PROCEDURE — 99213 OFFICE O/P EST LOW 20 MIN: CPT | Performed by: INTERNAL MEDICINE

## 2018-05-31 PROCEDURE — 99406 BEHAV CHNG SMOKING 3-10 MIN: CPT | Performed by: INTERNAL MEDICINE

## 2018-05-31 NOTE — PROGRESS NOTES
"Chief Complaint   Patient presents with   • Follow-up     2 months for BP. Pt states she's been having alot of gas and feels tired.      Subjective   Crystal Ragsdale is a 67 y.o. female.     Here for follow up of HTN and anxiety.   At last visit in Feb, we stopped metoprolol and changed to coreg 25 mg BID.  Her BP is much improved today.   She is complaining of fatigue and increased gas/bloating.  If she takes her coreg with meals she has less gas sxs.   If she has nothing to do, she just wants to nap or sleep.  If she has something to do she is not tired.   She is having much less anxiety overall.  Her last appt with counselor was in April and she has no more follow ups scheduled.  She can schedule follow up if needed.   No CP, SOA, edema or palpitations.    She continues to smoke a PPD.  She is not interested in quitting at this time.          The following portions of the patient's history were reviewed and updated as appropriate: allergies, current medications, past family history, past medical history, past social history, past surgical history and problem list.    Review of Systems   Constitutional: Positive for fatigue.   Respiratory: Negative for shortness of breath.    Cardiovascular: Negative for chest pain, palpitations and leg swelling.   Gastrointestinal:        Occasional gas and bloating   Genitourinary: Negative.    Psychiatric/Behavioral: Negative for dysphoric mood and sleep disturbance. The patient is nervous/anxious.    All other systems reviewed and are negative.      Objective   /80   Pulse 70   Temp 98.3 °F (36.8 °C)   Ht 160 cm (63\")   Wt 102 kg (225 lb)   SpO2 96%   BMI 39.86 kg/m²   Body mass index is 39.86 kg/m².  Physical Exam   Constitutional: She is oriented to person, place, and time. She appears well-developed and well-nourished.   HENT:   Head: Normocephalic and atraumatic.   Mouth/Throat: Oropharynx is clear and moist.   Eyes: Conjunctivae and EOM are normal. Pupils are " equal, round, and reactive to light.   Neck: Normal range of motion. Neck supple. No thyromegaly present.   Cardiovascular: Normal rate, regular rhythm, normal heart sounds and intact distal pulses.    No murmur heard.  Pulmonary/Chest: Effort normal and breath sounds normal. No respiratory distress.   Abdominal: Soft. Bowel sounds are normal.   Musculoskeletal: She exhibits no edema.   Lymphadenopathy:     She has no cervical adenopathy.   Neurological: She is alert and oriented to person, place, and time. No cranial nerve deficit.   Psychiatric: Judgment normal.   Slightly anxious today   Nursing note and vitals reviewed.      Assessment/Plan   Crystal Ragsdale is here today and the following problems have been addressed:      Crystal was seen today for follow-up.    Diagnoses and all orders for this visit:    Essential hypertension    Anxiety associated with depression    Fatigue due to depression    Hyperglycemia    Other orders  -     CloNIDine (CATAPRES-TTS) 0.2 MG/24HR patch; Place 1 patch on the skin 1 (One) Time Per Week.    Follow heart healthy/low salt diet  Avoid processed foods  Monitor blood pressure as discussed  Exercise as tolerated up to 30 minutes 5 days per week  Take all medications as prescribed  Continue hydroxyzine for anxiety  Reassurance and encouragement given today  Risks/benefits and potential side effects of various smoking cessation treatment options reviewed with patient.  Smoking cessation support options also reviewed with patient.  A total of 5 minutes dedicated to smoking cessation counseling during visit- pt is not yet interested in smoking cessation options      RTC 2 mo for medicare wellness    Please note that portions of this note were completed with a voice recognition program.  Efforts were made to edit dictation, but occasionally words are mistranscribed.

## 2018-08-03 ENCOUNTER — OFFICE VISIT (OUTPATIENT)
Dept: INTERNAL MEDICINE | Facility: CLINIC | Age: 68
End: 2018-08-03

## 2018-08-03 VITALS
BODY MASS INDEX: 39.34 KG/M2 | DIASTOLIC BLOOD PRESSURE: 98 MMHG | OXYGEN SATURATION: 97 % | SYSTOLIC BLOOD PRESSURE: 150 MMHG | HEIGHT: 63 IN | WEIGHT: 222 LBS | TEMPERATURE: 98 F | HEART RATE: 66 BPM

## 2018-08-03 DIAGNOSIS — I10 ESSENTIAL HYPERTENSION: Primary | ICD-10-CM

## 2018-08-03 DIAGNOSIS — F41.8 ANXIETY ASSOCIATED WITH DEPRESSION: ICD-10-CM

## 2018-08-03 DIAGNOSIS — R73.9 HYPERGLYCEMIA: ICD-10-CM

## 2018-08-03 PROCEDURE — 99213 OFFICE O/P EST LOW 20 MIN: CPT | Performed by: INTERNAL MEDICINE

## 2018-08-03 NOTE — PROGRESS NOTES
"Chief Complaint   Patient presents with   • Follow-up     2 months for anxiety with dpression and HTN. No new complaints.      Subjective   Crystal Ragsdale is a 67 y.o. female.     Here today for follow up of HTN, anxiety and depression.   She has had some recent deaths in the family.  This has made her more anxious lately.   BP is elevated today.  She has not been checking her BP.  No CP, SOA.  No edema, palpitations. The clonidine patch did not stick well this week.  Usually it does.   She has a lot of gas.    She is currently quite anxious about upcoming dental work.  Has not seen her counselor.   She still smokes a PPD and is not interested in quitting.   She is interested in finding some volunteer work.          The following portions of the patient's history were reviewed and updated as appropriate: allergies, current medications, past family history, past medical history, past social history, past surgical history and problem list.    Review of Systems   Constitutional: Positive for fatigue.   Respiratory: Negative for shortness of breath.    Cardiovascular: Negative for chest pain, palpitations and leg swelling.   Psychiatric/Behavioral: Negative for dysphoric mood and sleep disturbance. The patient is nervous/anxious.    All other systems reviewed and are negative.      Objective   /98   Pulse 66   Temp 98 °F (36.7 °C)   Ht 160 cm (63\")   Wt 101 kg (222 lb)   SpO2 97%   BMI 39.33 kg/m²   Body mass index is 39.33 kg/m².  Physical Exam   Constitutional: She is oriented to person, place, and time. She appears well-developed and well-nourished.   Pleasant female in no apparent distress, obese   HENT:   Head: Normocephalic and atraumatic.   Mouth/Throat: Oropharynx is clear and moist.   Eyes: Pupils are equal, round, and reactive to light. Conjunctivae and EOM are normal.   Neck: Normal range of motion. Neck supple. No thyromegaly present.   Cardiovascular: Normal rate, regular rhythm, normal heart " sounds and intact distal pulses.    No murmur heard.  Pulmonary/Chest: Effort normal and breath sounds normal. No respiratory distress.   Abdominal: Soft. Bowel sounds are normal.   Musculoskeletal: She exhibits no edema.   Lymphadenopathy:     She has no cervical adenopathy.   Neurological: She is alert and oriented to person, place, and time. No cranial nerve deficit.   Psychiatric: Her behavior is normal. Judgment and thought content normal.   Patient is somewhat anxious today   Nursing note and vitals reviewed.      Assessment/Plan   Crystal Ragsdale is here today and the following problems have been addressed:      Crystal was seen today for follow-up.    Diagnoses and all orders for this visit:    Essential hypertension    Anxiety associated with depression    Hyperglycemia    Follow heart healthy/low salt diet  Avoid processed foods  Exercise as tolerated up to 30 minutes 5 days per week  Take all medications as prescribed  Labs next visit with Medicare wellness  Encourage patient to see her counselor regarding worsening anxiety  Encourage patient to try to find some volunteer work, she enjoys children and try to encourage her to go to hospital and perhaps rock babies in the nursery    RTC 3 mo    Please note that portions of this note were completed with a voice recognition program.  Efforts were made to edit dictation, but occasionally words are mistranscribed.

## 2018-09-24 RX ORDER — HYDROXYZINE HYDROCHLORIDE 25 MG/1
TABLET, FILM COATED ORAL
Qty: 60 TABLET | Refills: 3 | Status: SHIPPED | OUTPATIENT
Start: 2018-09-24 | End: 2018-12-28 | Stop reason: SDUPTHER

## 2018-11-12 RX ORDER — LOSARTAN POTASSIUM 50 MG/1
TABLET ORAL
Qty: 30 TABLET | Refills: 5 | Status: SHIPPED | OUTPATIENT
Start: 2018-11-12 | End: 2019-03-11 | Stop reason: SDUPTHER

## 2018-12-28 ENCOUNTER — OFFICE VISIT (OUTPATIENT)
Dept: INTERNAL MEDICINE | Facility: CLINIC | Age: 68
End: 2018-12-28

## 2018-12-28 VITALS
WEIGHT: 222 LBS | DIASTOLIC BLOOD PRESSURE: 116 MMHG | SYSTOLIC BLOOD PRESSURE: 190 MMHG | HEIGHT: 63 IN | BODY MASS INDEX: 39.34 KG/M2 | OXYGEN SATURATION: 97 % | TEMPERATURE: 98.8 F | HEART RATE: 70 BPM

## 2018-12-28 DIAGNOSIS — R73.9 HYPERGLYCEMIA: ICD-10-CM

## 2018-12-28 DIAGNOSIS — I10 ESSENTIAL HYPERTENSION: ICD-10-CM

## 2018-12-28 DIAGNOSIS — F41.8 ANXIETY ASSOCIATED WITH DEPRESSION: ICD-10-CM

## 2018-12-28 DIAGNOSIS — Z00.00 ENCOUNTER FOR MEDICARE ANNUAL WELLNESS EXAM: ICD-10-CM

## 2018-12-28 PROCEDURE — G0439 PPPS, SUBSEQ VISIT: HCPCS | Performed by: INTERNAL MEDICINE

## 2018-12-28 RX ORDER — HYDROXYZINE HYDROCHLORIDE 25 MG/1
25 TABLET, FILM COATED ORAL EVERY 8 HOURS PRN
Qty: 60 TABLET | Refills: 5 | Status: SHIPPED | OUTPATIENT
Start: 2018-12-28 | End: 2018-12-28 | Stop reason: SDUPTHER

## 2018-12-28 RX ORDER — HYDROXYZINE HYDROCHLORIDE 25 MG/1
25 TABLET, FILM COATED ORAL EVERY 8 HOURS PRN
Qty: 90 TABLET | Refills: 5 | Status: SHIPPED | OUTPATIENT
Start: 2018-12-28 | End: 2019-03-11 | Stop reason: SDUPTHER

## 2018-12-28 RX ORDER — CARVEDILOL 25 MG/1
25 TABLET ORAL 2 TIMES DAILY WITH MEALS
Qty: 60 TABLET | Refills: 11 | Status: SHIPPED | OUTPATIENT
Start: 2018-12-28 | End: 2019-06-19 | Stop reason: SDUPTHER

## 2018-12-28 RX ORDER — MUPIROCIN CALCIUM 20 MG/G
CREAM TOPICAL 2 TIMES DAILY
Qty: 30 G | Refills: 3 | Status: ON HOLD | OUTPATIENT
Start: 2018-12-28 | End: 2020-08-03

## 2018-12-28 NOTE — PROGRESS NOTES
QUICK REFERENCE INFORMATION:  The ABCs of the Annual Wellness Visit    Subsequent Medicare Wellness Visit    HEALTH RISK ASSESSMENT    1950    Recent Hospitalizations:  No hospitalization(s) within the last year..        Current Medical Providers:  Patient Care Team:  Vermeesch, Marilyn K, MD as PCP - General  Vermeesch, Marilyn K, MD as PCP - Claims Attributed        Smoking Status:  Social History     Tobacco Use   Smoking Status Current Every Day Smoker   • Packs/day: 1.00   Smokeless Tobacco Never Used       Alcohol Consumption:  Social History     Substance and Sexual Activity   Alcohol Use No       Depression Screen:   PHQ-2/PHQ-9 Depression Screening 12/28/2018   Little interest or pleasure in doing things 0   Feeling down, depressed, or hopeless 0   Total Score 0       Health Habits and Functional and Cognitive Screening:  Functional & Cognitive Status 12/28/2018   Do you have difficulty preparing food and eating? No   Do you have difficulty bathing yourself, getting dressed or grooming yourself? No   Do you have difficulty using the toilet? No   Do you have difficulty moving around from place to place? No   Do you have trouble with steps or getting out of a bed or a chair? Yes   In the past year have you fallen or experienced a near fall? No   Current Diet Limited Junk Food   Dental Exam Up to date   Eye Exam Not up to date   Exercise (times per week) 0 times per week   Current Exercise Activities Include None   Do you need help using the phone?  No   Are you deaf or do you have serious difficulty hearing?  No   Do you need help with transportation? No   Do you need help shopping? No   Do you need help preparing meals?  No   Do you need help with housework?  No   Do you need help with laundry? No   Do you need help taking your medications? No   Do you need help managing money? No   Do you ever drive or ride in a car without wearing a seat belt? No   Have you felt unusual stress, anger or loneliness in  the last month? Yes   Who do you live with? Alone   If you need help, do you have trouble finding someone available to you? No   Do you have difficulty concentrating, remembering or making decisions? No           Does the patient have evidence of cognitive impairment? No    Aspirin use counseling: Start ASA 81 mg daily       Recent Lab Results:  CMP:  Lab Results   Component Value Date     (H) 02/15/2018    BUN 13 02/15/2018    CREATININE 0.70 02/15/2018    EGFRIFNONA 84 02/15/2018    EGFRIFAFRI 101 02/15/2018    BCR 18.6 02/15/2018     (H) 02/15/2018    K 3.9 02/15/2018    CO2 27.0 02/15/2018    CALCIUM 10.0 02/15/2018    PROTENTOTREF 6.8 11/02/2017    ALBUMIN 3.80 11/02/2017    LABGLOBREF 3.0 11/02/2017    LABIL2 1.3 11/02/2017    BILITOT 0.5 11/02/2017    ALKPHOS 110 11/02/2017    AST 17 11/02/2017    ALT 21 11/02/2017     Lipid Panel:  Lab Results   Component Value Date    CHOL 156 08/01/2016    TRIG 109 11/02/2017    HDL 33 (L) 11/02/2017    VLDL 21.8 11/02/2017     HbA1c:  Lab Results   Component Value Date    HGBA1C 5.80 02/15/2018       Visual Acuity:  No exam data present    Age-appropriate Screening Schedule:  Refer to the list below for future screening recommendations based on patient's age, sex and/or medical conditions. Orders for these recommended tests are listed in the plan section. The patient has been provided with a written plan.    Health Maintenance   Topic Date Due   • ZOSTER VACCINE (1 of 2) 08/08/2001   • COLONOSCOPY  05/12/2016   • MAMMOGRAM  03/13/2020   • INFLUENZA VACCINE  Addressed   • PNEUMOCOCCAL VACCINES (65+ LOW/MEDIUM RISK)  Discontinued   • TDAP/TD VACCINES  Discontinued        Subjective   History of Present Illness    Crystal Ragsdale is a 68 y.o. female who presents for an Subsequent Wellness Visit. PMH of HTN, anxiety and hyperglycemia. She takes the atarax up to twice a day as needed, but usually only takes it once a day. She denies any CP, SOA, palpitations or  edema.  Her blood pressure is extremely high again today.  She states that she was unable to have her dental work done due to high blood pressure also.  She gets extremely anxious at doctor's offices.  She has not been checking her blood pressure at home because as soon as she gets her blood pressure cuff out she can feel her blood pressure increase.  She only feels anxious is she goes to the doctor or dentist or [place].  We have tried Celexa as well as BuSpar and patient had side effects to medications.  She feels fine taking Atarax but has not been taking it often enough.    The following portions of the patient's history were reviewed and updated as appropriate: allergies, current medications, past family history, past medical history, past social history, past surgical history and problem list.    Outpatient Medications Prior to Visit   Medication Sig Dispense Refill   • CloNIDine (CATAPRES-TTS) 0.2 MG/24HR patch Place 1 patch on the skin 1 (One) Time Per Week. 4 patch 11   • losartan (COZAAR) 50 MG tablet take 1 tablet by mouth once daily 30 tablet 5   • carvedilol (COREG) 25 MG tablet Take 1 tablet by mouth 2 (Two) Times a Day With Meals. 60 tablet 11   • hydrOXYzine (ATARAX) 25 MG tablet take 1 tablet by mouth every 8 hours if needed for itching or anxiety 60 tablet 3     No facility-administered medications prior to visit.        Patient Active Problem List   Diagnosis   • Essential hypertension   • Anxiety associated with depression   • Hyperglycemia   • Encounter for Medicare annual wellness exam       Advance Care Planning:  has NO advance directive - information provided to the patient today    Identification of Risk Factors:  Risk factors include: weight , unhealthy diet, cardiovascular risk, inactivity, tobacco use and depression.    Review of Systems   Constitutional: Positive for fatigue.   HENT: Positive for postnasal drip.    Eyes: Negative.    Respiratory: Negative.     Cardiovascular: Negative.    Gastrointestinal: Negative for blood in stool, nausea and vomiting.        Gas and belching   Endocrine: Negative.    Genitourinary: Negative.    Musculoskeletal: Negative.    Skin: Positive for color change and rash.        Rash over lower abdomen  Rash under breasts   Allergic/Immunologic: Positive for environmental allergies.   Neurological: Negative.    Hematological: Negative.    Psychiatric/Behavioral: Negative for sleep disturbance. The patient is nervous/anxious.        Compared to one year ago, the patient feels her physical health is the same.  Compared to one year ago, the patient feels her mental health is the same.    Objective     Physical Exam   Constitutional: She is oriented to person, place, and time. She appears well-developed and well-nourished. No distress.   Pleasant female, obese, appears anxious   HENT:   Head: Normocephalic and atraumatic.   Right Ear: External ear normal.   Left Ear: External ear normal.   Mouth/Throat: Oropharynx is clear and moist.   Eyes: Conjunctivae and EOM are normal. Pupils are equal, round, and reactive to light.   Neck: Normal range of motion. Neck supple. No thyromegaly present.   Cardiovascular: Normal rate, regular rhythm, normal heart sounds and intact distal pulses.   No murmur heard.  No carotid bruits   Pulmonary/Chest: Effort normal and breath sounds normal. No respiratory distress. She has no wheezes.   Abdominal: Soft. Bowel sounds are normal. She exhibits no distension. There is no tenderness.   Obesity limits exam   Musculoskeletal: Normal range of motion. She exhibits no edema.   Lymphadenopathy:     She has no cervical adenopathy.   Neurological: She is alert and oriented to person, place, and time. No cranial nerve deficit. Coordination normal.   Skin:   Scattered erythematous lesions with scabbing over abdomen and beneath the breast consistent with hidradenitis    Psychiatric: Her behavior is normal. Judgment and  "thought content normal.   Patient appears anxious but no obvious depression   Nursing note and vitals reviewed.      Vitals:    12/28/18 1556   BP: (!) 190/116   Pulse: 70   Temp: 98.8 °F (37.1 °C)   SpO2: 97%   Weight: 101 kg (222 lb)   Height: 160 cm (63\")   PainSc: 0-No pain       Patient's Body mass index is 39.33 kg/m². BMI is above normal parameters. Recommendations include: exercise counseling and nutrition counseling.      Assessment/Plan   Patient Self-Management and Personalized Health Advice  The patient has been provided with information about: diet, exercise, weight management, tobacco cessation, designing advance directives, supplements and mental health concerns and preventive services including:   · Advance directive, Diabetes screening, see lab orders, Exercise counseling provided, Nutrition counseling provided, recommend Hep A and shingles vaccine at pharmacy.    Visit Diagnoses:    ICD-10-CM ICD-9-CM   1. Encounter for Medicare annual wellness exam Z00.00 V70.0   2. Essential hypertension I10 401.9   3. Anxiety associated with depression F41.8 300.4   4. Hyperglycemia R73.9 790.29       Orders Placed This Encounter   Procedures   • Comprehensive Metabolic Panel   • Hemoglobin A1c   • Lipid Panel With / Chol / HDL Ratio   • CBC & Differential     Order Specific Question:   Manual Differential     Answer:   No       Outpatient Encounter Medications as of 12/28/2018   Medication Sig Dispense Refill   • carvedilol (COREG) 25 MG tablet Take 1 tablet by mouth 2 (Two) Times a Day With Meals. 60 tablet 11   • CloNIDine (CATAPRES-TTS) 0.2 MG/24HR patch Place 1 patch on the skin 1 (One) Time Per Week. 4 patch 11   • hydrOXYzine (ATARAX) 25 MG tablet Take 1 tablet by mouth Every 8 (Eight) Hours As Needed for Anxiety. 90 tablet 5   • losartan (COZAAR) 50 MG tablet take 1 tablet by mouth once daily 30 tablet 5   • mupirocin (BACTROBAN) 2 % cream Apply  topically to the appropriate area as directed 2 (Two) " Times a Day. 30 g 3   • [DISCONTINUED] carvedilol (COREG) 25 MG tablet Take 1 tablet by mouth 2 (Two) Times a Day With Meals. 60 tablet 11   • [DISCONTINUED] hydrOXYzine (ATARAX) 25 MG tablet take 1 tablet by mouth every 8 hours if needed for itching or anxiety 60 tablet 3   • [DISCONTINUED] hydrOXYzine (ATARAX) 25 MG tablet Take 1 tablet by mouth Every 8 (Eight) Hours As Needed for Anxiety. 60 tablet 5     No facility-administered encounter medications on file as of 12/28/2018.        Reviewed use of high risk medication in the elderly: yes  Reviewed for potential of harmful drug interactions in the elderly: not applicable     She will schedule her mammogram  Given info on advance directives, request copy  Recommend lamisil or tinactin to rash under breast when bothersome  Given bactroban for lesions of lower abdomen  Recommend daily vit D 2000 u daily  Encouraged Hep A and shingrix vaccine  Labs as noted  Recommend she take atarax 25 mg TID prn for anxiety in hopes this will help lower her blood pressure, as patient does not want another medication for anxiety    Follow Up:  Return in about 4 months (around 4/28/2019) for Next scheduled follow up.     An After Visit Summary and PPPS with all of these plans were given to the patient.

## 2019-01-11 LAB
ALBUMIN SERPL-MCNC: 4.2 G/DL (ref 3.5–5)
ALBUMIN/GLOB SERPL: 1.4 G/DL (ref 1–2)
ALP SERPL-CCNC: 116 U/L (ref 38–126)
ALT SERPL-CCNC: 24 U/L (ref 13–69)
AST SERPL-CCNC: 16 U/L (ref 15–46)
BASOPHILS # BLD AUTO: 0.04 10*3/MM3 (ref 0–0.2)
BASOPHILS NFR BLD AUTO: 0.5 % (ref 0–2.5)
BILIRUB SERPL-MCNC: 0.8 MG/DL (ref 0.2–1.3)
BUN SERPL-MCNC: 12 MG/DL (ref 7–20)
BUN/CREAT SERPL: 20 (ref 7.1–23.5)
CALCIUM SERPL-MCNC: 9 MG/DL (ref 8.4–10.2)
CHLORIDE SERPL-SCNC: 106 MMOL/L (ref 98–107)
CHOLEST SERPL-MCNC: 144 MG/DL (ref 0–199)
CHOLEST/HDLC SERPL: 4.5 {RATIO}
CO2 SERPL-SCNC: 26 MMOL/L (ref 26–30)
CREAT SERPL-MCNC: 0.6 MG/DL (ref 0.6–1.3)
EOSINOPHIL # BLD AUTO: 0.05 10*3/MM3 (ref 0–0.7)
EOSINOPHIL NFR BLD AUTO: 0.6 % (ref 0–7)
ERYTHROCYTE [DISTWIDTH] IN BLOOD BY AUTOMATED COUNT: 13 % (ref 11.5–14.5)
GLOBULIN SER CALC-MCNC: 3 GM/DL
GLUCOSE SERPL-MCNC: 113 MG/DL (ref 74–98)
HBA1C MFR BLD: 5.8 %
HCT VFR BLD AUTO: 47.5 % (ref 37–47)
HDLC SERPL-MCNC: 32 MG/DL (ref 40–60)
HGB BLD-MCNC: 16.4 G/DL (ref 12–16)
IMM GRANULOCYTES # BLD AUTO: 0.02 10*3/MM3 (ref 0–0.06)
IMM GRANULOCYTES NFR BLD AUTO: 0.3 % (ref 0–0.6)
LDLC SERPL CALC-MCNC: 90 MG/DL (ref 0–99)
LYMPHOCYTES # BLD AUTO: 2.55 10*3/MM3 (ref 0.6–3.4)
LYMPHOCYTES NFR BLD AUTO: 32 % (ref 10–50)
MCH RBC QN AUTO: 31.8 PG (ref 27–31)
MCHC RBC AUTO-ENTMCNC: 34.5 G/DL (ref 30–37)
MCV RBC AUTO: 92.1 FL (ref 81–99)
MONOCYTES # BLD AUTO: 0.43 10*3/MM3 (ref 0–0.9)
MONOCYTES NFR BLD AUTO: 5.4 % (ref 0–12)
NEUTROPHILS # BLD AUTO: 4.89 10*3/MM3 (ref 2–6.9)
NEUTROPHILS NFR BLD AUTO: 61.2 % (ref 37–80)
NRBC BLD AUTO-RTO: 0 /100 WBC (ref 0–0)
PLATELET # BLD AUTO: 265 10*3/MM3 (ref 130–400)
POTASSIUM SERPL-SCNC: 4 MMOL/L (ref 3.5–5.1)
PROT SERPL-MCNC: 7.2 G/DL (ref 6.3–8.2)
RBC # BLD AUTO: 5.16 10*6/MM3 (ref 4.2–5.4)
SODIUM SERPL-SCNC: 141 MMOL/L (ref 137–145)
TRIGL SERPL-MCNC: 109 MG/DL
VLDLC SERPL CALC-MCNC: 21.8 MG/DL
WBC # BLD AUTO: 7.98 10*3/MM3 (ref 4.8–10.8)

## 2019-03-11 RX ORDER — LOSARTAN POTASSIUM 50 MG/1
50 TABLET ORAL DAILY
Qty: 30 TABLET | Refills: 5 | Status: SHIPPED | OUTPATIENT
Start: 2019-03-11 | End: 2019-06-19 | Stop reason: SDUPTHER

## 2019-03-11 RX ORDER — HYDROXYZINE HYDROCHLORIDE 25 MG/1
25 TABLET, FILM COATED ORAL EVERY 8 HOURS PRN
Qty: 90 TABLET | Refills: 5 | Status: SHIPPED | OUTPATIENT
Start: 2019-03-11 | End: 2019-06-19 | Stop reason: SDUPTHER

## 2019-04-29 ENCOUNTER — OFFICE VISIT (OUTPATIENT)
Dept: INTERNAL MEDICINE | Facility: CLINIC | Age: 69
End: 2019-04-29

## 2019-04-29 VITALS
DIASTOLIC BLOOD PRESSURE: 60 MMHG | SYSTOLIC BLOOD PRESSURE: 160 MMHG | WEIGHT: 223 LBS | OXYGEN SATURATION: 97 % | TEMPERATURE: 99 F | HEIGHT: 63 IN | HEART RATE: 72 BPM | RESPIRATION RATE: 18 BRPM | BODY MASS INDEX: 39.51 KG/M2

## 2019-04-29 DIAGNOSIS — Z71.6 TOBACCO ABUSE COUNSELING: ICD-10-CM

## 2019-04-29 DIAGNOSIS — I10 ESSENTIAL HYPERTENSION: Primary | ICD-10-CM

## 2019-04-29 DIAGNOSIS — R73.9 HYPERGLYCEMIA: ICD-10-CM

## 2019-04-29 DIAGNOSIS — F41.8 ANXIETY ASSOCIATED WITH DEPRESSION: ICD-10-CM

## 2019-04-29 PROCEDURE — 99214 OFFICE O/P EST MOD 30 MIN: CPT | Performed by: INTERNAL MEDICINE

## 2019-04-29 PROCEDURE — 99406 BEHAV CHNG SMOKING 3-10 MIN: CPT | Performed by: INTERNAL MEDICINE

## 2019-04-29 NOTE — PROGRESS NOTES
"Chief Complaint   Patient presents with   • Follow-up     4 MO for HTN, Anxiety and depression, Hyperglycemia.     Subjective   Crystal Ragsdale is a 68 y.o. female.     Here today for follow-up of hypertension, anxiety/depression and hyperglycemia.  HTN- she feels good overall at home.  She feels calm until she starts to drive and gets closer to the clinic.    Depression/anxiety- she is accepting her home life.  She is less anxious overall.  She dreams a lot.    Hyperglycemia-  She is trying to stay away from sweets for her BS.  Also avoids carbohydrates.  She does some yardwork.  No exercise  HCM- she tried to get Hep A and shingles vaccine but they were not available  She continues to smoke 1 pack of cigarettes daily.  She is not currently interested in smoking cessation.  We have discussed this at length in the past and again today.  She feels that this is what helps her anxiety more than most medications.         The following portions of the patient's history were reviewed and updated as appropriate: allergies, current medications, past family history, past medical history, past social history, past surgical history and problem list.    Review of Systems   Constitutional: Negative for activity change, appetite change and unexpected weight change.   Eyes: Negative for visual disturbance.   Respiratory: Negative for shortness of breath.    Cardiovascular: Negative for chest pain, palpitations and leg swelling.   Gastrointestinal: Negative for abdominal pain.   Neurological: Negative for headaches.   Psychiatric/Behavioral: Negative for dysphoric mood and sleep disturbance. The patient is nervous/anxious.    All other systems reviewed and are negative.      Objective   /60   Pulse 72   Temp 99 °F (37.2 °C)   Resp 18   Ht 160 cm (62.99\")   Wt 101 kg (223 lb)   SpO2 97%   BMI 39.51 kg/m²   Body mass index is 39.51 kg/m².  Physical Exam   Constitutional: She is oriented to person, place, and time. She " appears well-developed and well-nourished. No distress.   Very pleasant female who appears in no distress other than mild anxiety   HENT:   Head: Normocephalic and atraumatic.   Right Ear: External ear normal.   Left Ear: External ear normal.   Mouth/Throat: Oropharynx is clear and moist.   Eyes: Conjunctivae and EOM are normal. Pupils are equal, round, and reactive to light.   Neck: Normal range of motion. Neck supple. No thyromegaly present.   Cardiovascular: Normal rate, regular rhythm, normal heart sounds and intact distal pulses.   No murmur heard.  No carotid bruits   Pulmonary/Chest: Effort normal. No respiratory distress. She has no wheezes.   Few rhonchi noted at bases of lungs bilaterally   Abdominal: Soft. Bowel sounds are normal. She exhibits no distension. There is no tenderness.   Musculoskeletal: Normal range of motion. She exhibits no edema.   Lymphadenopathy:     She has no cervical adenopathy.   Neurological: She is alert and oriented to person, place, and time. No cranial nerve deficit. Coordination normal.   Psychiatric: Her behavior is normal. Judgment and thought content normal.   Patient is slightly anxious today, but not as bad as her usual underlying anxiety   Nursing note and vitals reviewed.      Assessment/Plan   Crystal Ragsdale is here today and the following problems have been addressed:      Crystal was seen today for follow-up.    Diagnoses and all orders for this visit:    Essential hypertension    Anxiety associated with depression    Hyperglycemia    Tobacco abuse counseling        Follow heart healthy/low salt diet  Avoid processed foods  Monitor blood pressure on occasion as tolerated  Exercise as tolerated   Take all medications as prescribed  Continue atarax for anxiety, may add CBD oil also  Again discussed smoking cessation with patient for approximately 5 minutes, she is not interested in quitting at this time      Return in about 4 months (around 8/29/2019) for Next scheduled  follow up.      Marilyn K. Vermeesch, MD      Please note that portions of this note were completed with a voice recognition program.  Efforts were made to edit dictation, but occasionally words are mistranscribed.

## 2019-06-20 RX ORDER — LOSARTAN POTASSIUM 50 MG/1
50 TABLET ORAL DAILY
Qty: 90 TABLET | Refills: 1 | Status: SHIPPED | OUTPATIENT
Start: 2019-06-20 | End: 2019-11-05 | Stop reason: SDUPTHER

## 2019-06-20 RX ORDER — CARVEDILOL 25 MG/1
25 TABLET ORAL 2 TIMES DAILY WITH MEALS
Qty: 180 TABLET | Refills: 3 | Status: SHIPPED | OUTPATIENT
Start: 2019-06-20 | End: 2019-12-30

## 2019-06-20 RX ORDER — HYDROXYZINE HYDROCHLORIDE 25 MG/1
25 TABLET, FILM COATED ORAL EVERY 8 HOURS PRN
Qty: 120 TABLET | Refills: 5 | Status: SHIPPED | OUTPATIENT
Start: 2019-06-20 | End: 2019-10-20 | Stop reason: SDUPTHER

## 2019-06-24 RX ORDER — CLONIDINE 0.2 MG/24H
PATCH, EXTENDED RELEASE TRANSDERMAL
Qty: 4 PATCH | Refills: 5 | Status: SHIPPED | OUTPATIENT
Start: 2019-06-24 | End: 2019-12-13 | Stop reason: SDUPTHER

## 2019-08-29 ENCOUNTER — OFFICE VISIT (OUTPATIENT)
Dept: INTERNAL MEDICINE | Facility: CLINIC | Age: 69
End: 2019-08-29

## 2019-08-29 VITALS
DIASTOLIC BLOOD PRESSURE: 96 MMHG | SYSTOLIC BLOOD PRESSURE: 154 MMHG | HEIGHT: 63 IN | OXYGEN SATURATION: 96 % | HEART RATE: 68 BPM | TEMPERATURE: 98.5 F | WEIGHT: 226 LBS | BODY MASS INDEX: 40.04 KG/M2

## 2019-08-29 DIAGNOSIS — M25.572 CHRONIC PAIN OF LEFT ANKLE: ICD-10-CM

## 2019-08-29 DIAGNOSIS — I10 ESSENTIAL HYPERTENSION: Primary | ICD-10-CM

## 2019-08-29 DIAGNOSIS — G89.29 CHRONIC PAIN OF LEFT ANKLE: ICD-10-CM

## 2019-08-29 DIAGNOSIS — F41.8 ANXIETY ASSOCIATED WITH DEPRESSION: ICD-10-CM

## 2019-08-29 DIAGNOSIS — R73.9 HYPERGLYCEMIA: ICD-10-CM

## 2019-08-29 PROCEDURE — 99214 OFFICE O/P EST MOD 30 MIN: CPT | Performed by: INTERNAL MEDICINE

## 2019-08-29 NOTE — PROGRESS NOTES
"Chief Complaint   Patient presents with   • Follow-up     for anxiety with depression, HTN, and hyperglycemia. Pt states her left ankle tends to hurt and swell.      Subjective   Crystal Ragsdale is a 69 y.o. female.        Here today for follow-up of hypertension, anxiety/depression and hyperglycemia.  HTN- BP elevated as always due to white coat syndrome.   She has been feeling good.  No CP, SOA, palpitation or edema.  She does not check her blood pressure at home as she is too anxious to do so  Depression/anxiety-  No depression.  She is not anxious unless she comes to doctor or dentist.  She is taking CBD oil and it is helpful  Hyperglycemia-  She does not follow any specific diet, but says she is trying to cut back on her bread, potatoes  HCM- she tried to get Hep A and shingles vaccine but they were not available.   She is having some left ankle pain, she injured it many yrs ago.  Has intermittent ankle swelling.  She declines offer for x-ray today            The following portions of the patient's history were reviewed and updated as appropriate: allergies, current medications, past family history, past medical history, past social history, past surgical history and problem list.    Review of Systems   Constitutional: Negative for activity change, appetite change and unexpected weight change.   Eyes: Negative for visual disturbance.   Respiratory: Negative for shortness of breath.    Cardiovascular: Positive for leg swelling. Negative for chest pain and palpitations.   Gastrointestinal: Negative for abdominal pain.   Musculoskeletal: Positive for arthralgias.        Left ankle pain   Neurological: Negative for headaches.   Psychiatric/Behavioral: Negative for dysphoric mood and sleep disturbance. The patient is nervous/anxious.        Objective   /96   Pulse 68   Temp 98.5 °F (36.9 °C)   Ht 160 cm (62.99\")   Wt 103 kg (226 lb)   SpO2 96%   BMI 40.05 kg/m²   Body mass index is 40.05 kg/m².  Physical " Exam   Constitutional: She is oriented to person, place, and time. She appears well-developed and well-nourished. No distress.   Very pleasant female in no obvious distress, she appears her stated age   HENT:   Head: Normocephalic and atraumatic.   Right Ear: External ear normal.   Left Ear: External ear normal.   Mouth/Throat: Oropharynx is clear and moist.   Eyes: Conjunctivae and EOM are normal. Pupils are equal, round, and reactive to light.   Neck: Normal range of motion. Neck supple. No thyromegaly present.   Cardiovascular: Normal rate, regular rhythm, normal heart sounds and intact distal pulses.   No murmur heard.  No carotid bruits   Pulmonary/Chest: Effort normal and breath sounds normal. No respiratory distress. She has no wheezes.   Abdominal: Soft. Bowel sounds are normal. She exhibits no distension. There is no tenderness.   Musculoskeletal: Normal range of motion. She exhibits edema.   +1 edema of ankles bilaterally  Left ankle with no significant pain on range of motion, no pain with palpation over lateral ankle   Lymphadenopathy:     She has no cervical adenopathy.   Neurological: She is alert and oriented to person, place, and time. No cranial nerve deficit. Coordination normal.   Skin:   Right upper chest wall with small area of erythema from recent clonidine patch   Psychiatric: She has a normal mood and affect. Her behavior is normal. Judgment and thought content normal.   Nursing note and vitals reviewed.      Assessment/Plan   Crystal Ragsdale is here today and the following problems have been addressed:      Crystal was seen today for follow-up.    Diagnoses and all orders for this visit:    Essential hypertension    Anxiety associated with depression    Hyperglycemia    Chronic pain of left ankle        Follow heart healthy/low salt diet  Avoid processed foods  Monitor blood pressure as discussed  Exercise as tolerated  Take all medications as prescribed  Her anxiety is doing better with CBD  oil and atarax BID prn  Encouraged her to do chair exercises to help lose weight  Recommend ankle sleeve for left ankle pain, patient declined offer for x-ray  Recommend Hep a and shingles vaccine at local pharmacy  Labs are currently up-to-date    Return in about 4 months (around 12/29/2019) for Medicare Wellness.      Marilyn K. Vermeesch, MD      Please note that portions of this note were completed with a voice recognition program.  Efforts were made to edit dictation, but occasionally words are mistranscribed.

## 2019-10-21 RX ORDER — HYDROXYZINE HYDROCHLORIDE 25 MG/1
TABLET, FILM COATED ORAL
Qty: 60 TABLET | Refills: 5 | Status: SHIPPED | OUTPATIENT
Start: 2019-10-21 | End: 2020-03-30 | Stop reason: SDUPTHER

## 2019-11-05 RX ORDER — LOSARTAN POTASSIUM 50 MG/1
TABLET ORAL
Qty: 60 TABLET | Refills: 5 | Status: SHIPPED | OUTPATIENT
Start: 2019-11-05 | End: 2020-03-30 | Stop reason: SDUPTHER

## 2019-12-13 RX ORDER — CLONIDINE 0.2 MG/24H
PATCH, EXTENDED RELEASE TRANSDERMAL
Qty: 4 PATCH | Refills: 0 | Status: SHIPPED | OUTPATIENT
Start: 2019-12-13 | End: 2020-01-10

## 2019-12-30 RX ORDER — CARVEDILOL 25 MG/1
TABLET ORAL
Qty: 60 TABLET | Refills: 3 | Status: SHIPPED | OUTPATIENT
Start: 2019-12-30 | End: 2020-04-30 | Stop reason: SDUPTHER

## 2020-01-10 RX ORDER — CLONIDINE 0.2 MG/24H
PATCH, EXTENDED RELEASE TRANSDERMAL
Qty: 4 PATCH | Refills: 5 | Status: SHIPPED | OUTPATIENT
Start: 2020-01-10 | End: 2020-06-19 | Stop reason: SDUPTHER

## 2020-01-30 ENCOUNTER — OFFICE VISIT (OUTPATIENT)
Dept: INTERNAL MEDICINE | Facility: CLINIC | Age: 70
End: 2020-01-30

## 2020-01-30 ENCOUNTER — RESULTS ENCOUNTER (OUTPATIENT)
Dept: INTERNAL MEDICINE | Facility: CLINIC | Age: 70
End: 2020-01-30

## 2020-01-30 VITALS
SYSTOLIC BLOOD PRESSURE: 162 MMHG | HEIGHT: 63 IN | WEIGHT: 227 LBS | DIASTOLIC BLOOD PRESSURE: 90 MMHG | BODY MASS INDEX: 40.22 KG/M2 | OXYGEN SATURATION: 96 % | TEMPERATURE: 99.1 F | HEART RATE: 65 BPM

## 2020-01-30 DIAGNOSIS — R73.9 HYPERGLYCEMIA: ICD-10-CM

## 2020-01-30 DIAGNOSIS — Z12.11 COLON CANCER SCREENING: ICD-10-CM

## 2020-01-30 DIAGNOSIS — I10 ESSENTIAL HYPERTENSION: ICD-10-CM

## 2020-01-30 DIAGNOSIS — Z00.00 ENCOUNTER FOR MEDICARE ANNUAL WELLNESS EXAM: Primary | ICD-10-CM

## 2020-01-30 DIAGNOSIS — F41.8 ANXIETY ASSOCIATED WITH DEPRESSION: ICD-10-CM

## 2020-01-30 PROCEDURE — G0439 PPPS, SUBSEQ VISIT: HCPCS | Performed by: INTERNAL MEDICINE

## 2020-01-30 PROCEDURE — 99397 PER PM REEVAL EST PAT 65+ YR: CPT | Performed by: INTERNAL MEDICINE

## 2020-01-30 PROCEDURE — 96160 PT-FOCUSED HLTH RISK ASSMT: CPT | Performed by: INTERNAL MEDICINE

## 2020-01-30 NOTE — PROGRESS NOTES
The ABCs of the Annual Wellness Visit  Subsequent Medicare Wellness Visit    Chief Complaint   Patient presents with   • Medicare Wellness-subsequent       Subjective   History of Present Illness:  Crystal Ragsdale is a 69 y.o. female who presents for a Subsequent Medicare Wellness Visit.  PMH of HTN, anxiety and depression, hyperglycemia and tobacco use.  She is feeling well overall.  She is compliant with all medications.  She is taking CBD oil and feels it helps her anxiety-she feels more in control of her anxiety when she is out in public. She has not had any pain, palpitations or SOA.      HEALTH RISK ASSESSMENT    Recent Hospitalizations:  No hospitalization(s) within the last year.    Current Medical Providers:  Patient Care Team:  Vermeesch, Marilyn K, MD as PCP - General  Vermeesch, Marilyn K, MD as PCP - Claims Attributed    Smoking Status:  Social History     Tobacco Use   Smoking Status Current Every Day Smoker   • Packs/day: 1.00   Smokeless Tobacco Never Used       Alcohol Consumption:  Social History     Substance and Sexual Activity   Alcohol Use No       Depression Screen:   PHQ-2/PHQ-9 Depression Screening 1/30/2020   Little interest or pleasure in doing things 0   Feeling down, depressed, or hopeless 0   Total Score 0       Fall Risk Screen:  STEADI Fall Risk Assessment was completed, and patient is at LOW risk for falls.Assessment completed on:1/30/2020    Health Habits and Functional and Cognitive Screening:  Functional & Cognitive Status 1/30/2020   Do you have difficulty preparing food and eating? No   Do you have difficulty bathing yourself, getting dressed or grooming yourself? No   Do you have difficulty using the toilet? No   Do you have difficulty moving around from place to place? No   Do you have trouble with steps or getting out of a bed or a chair? Yes   Current Diet Limited Junk Food   Dental Exam Up to date   Eye Exam Up to date   Exercise (times per week) 0 times per week   Current  Exercise Activities Include None   Do you need help using the phone?  No   Are you deaf or do you have serious difficulty hearing?  No   Do you need help with transportation? No   Do you need help shopping? -   Do you need help preparing meals?  No   Do you need help with housework?  No   Do you need help with laundry? No   Do you need help taking your medications? No   Do you need help managing money? No   Do you ever drive or ride in a car without wearing a seat belt? No   Have you felt unusual stress, anger or loneliness in the last month? No   Who do you live with? Alone   If you need help, do you have trouble finding someone available to you? No   Do you have difficulty concentrating, remembering or making decisions? No         Does the patient have evidence of cognitive impairment? No    Asprin use counseling:Does not need ASA but is currently taking (advised patient that ASA is not indicated and patient chooses to stop it)    Age-appropriate Screening Schedule:  Refer to the list below for future screening recommendations based on patient's age, sex and/or medical conditions. Orders for these recommended tests are listed in the plan section. The patient has been provided with a written plan.    Health Maintenance   Topic Date Due   • ZOSTER VACCINE (1 of 2) 08/08/2001   • COLONOSCOPY  05/12/2016   • INFLUENZA VACCINE  08/01/2019   • MAMMOGRAM  04/19/2021   • TDAP/TD VACCINES  Discontinued          The following portions of the patient's history were reviewed and updated as appropriate: allergies, current medications, past family history, past medical history, past social history, past surgical history and problem list.    Outpatient Medications Prior to Visit   Medication Sig Dispense Refill   • carvedilol (COREG) 25 MG tablet TAKE 1 TABLET BY MOUTH TWICE A DAY WITH FOOD 60 tablet 3   • cloNIDine (CATAPRES-TTS) 0.2 MG/24HR patch APPLY 1 PATCH TO SKIN EVERY WEEK AS DIRECTED 4 patch 5   • hydrOXYzine (ATARAX)  "25 MG tablet TAKE 1 TABLET BY MOUTH EVERY 8 HOURS IF NEEDED FOR ANXIETY 60 tablet 5   • losartan (COZAAR) 50 MG tablet TAKE 1 TABLET BY MOUTH ONCE DAILY 60 tablet 5   • mupirocin (BACTROBAN) 2 % cream Apply  topically to the appropriate area as directed 2 (Two) Times a Day. 30 g 3     No facility-administered medications prior to visit.        Patient Active Problem List   Diagnosis   • Essential hypertension   • Anxiety associated with depression   • Hyperglycemia   • Encounter for Medicare annual wellness exam   • Tobacco abuse counseling   • Chronic pain of left ankle   • Colon cancer screening       Advanced Care Planning:  Patient does not have an advance directive - information provided to the patient today    Review of Systems   Constitutional: Negative.    HENT: Negative.    Eyes: Negative.    Respiratory: Negative.    Cardiovascular: Negative.    Gastrointestinal: Negative.    Endocrine: Negative.    Genitourinary: Negative.    Musculoskeletal: Negative.    Skin: Negative.    Allergic/Immunologic: Positive for environmental allergies.   Neurological: Negative.    Hematological: Negative.    Psychiatric/Behavioral: The patient is nervous/anxious.        Compared to one year ago, the patient feels her physical health is the same.  Compared to one year ago, the patient feels her mental health is the same.    Reviewed chart for potential of high risk medication in the elderly: yes  Reviewed chart for potential of harmful drug interactions in the elderly:yes    Objective         Vitals:    01/30/20 1335   BP: 162/90   Pulse: 65   Temp: 99.1 °F (37.3 °C)   SpO2: 96%   Weight: 103 kg (227 lb)   Height: 160 cm (62.99\")   PainSc: 0-No pain       Body mass index is 40.22 kg/m².  Discussed the patient's BMI with her. The BMI is above average; BMI management plan is completed.    Physical Exam   Constitutional: She is oriented to person, place, and time. She appears well-developed and well-nourished.   Very pleasant " female, NAD today   HENT:   Head: Normocephalic and atraumatic.   Right Ear: External ear normal.   Left Ear: External ear normal.   Mouth/Throat: Oropharynx is clear and moist. No oropharyngeal exudate.   TMs normal bilaterally   Eyes: Pupils are equal, round, and reactive to light. EOM are normal.   Neck: Normal range of motion. Neck supple.   Cardiovascular: Normal rate, regular rhythm, normal heart sounds and intact distal pulses.   No murmur heard.  No carotid bruits   Pulmonary/Chest: Effort normal and breath sounds normal. No respiratory distress. She has no wheezes. Right breast exhibits skin change. Right breast exhibits no inverted nipple, no mass, no nipple discharge and no tenderness. Left breast exhibits no inverted nipple, no mass, no nipple discharge, no skin change and no tenderness.   Lumpectomy scar above right areola       Abdominal: Soft. Bowel sounds are normal. She exhibits no distension and no mass. There is no tenderness.   Musculoskeletal: She exhibits no edema.   Lymphadenopathy:     She has no cervical adenopathy.   Neurological: She is alert and oriented to person, place, and time. She displays normal reflexes. No cranial nerve deficit.   Skin: No rash noted.   Psychiatric: She has a normal mood and affect. Her behavior is normal. Judgment and thought content normal.   Nursing note and vitals reviewed.            Assessment/Plan   Medicare Risks and Personalized Health Plan  CMS Preventative Services Quick Reference  Advance Directive Discussion  Cardiovascular risk  Colon Cancer Screening  Diabetic Lab Screening   Obesity/Overweight     The above risks/problems have been discussed with the patient.  Pertinent information has been shared with the patient in the After Visit Summary.  Follow up plans and orders are seen below in the Assessment/Plan Section.    Diagnoses and all orders for this visit:    1. Encounter for Medicare annual wellness exam (Primary)  -     CBC & Differential  -      Comprehensive Metabolic Panel  -     Hemoglobin A1c  -     Lipid Panel With / Chol / HDL Ratio  -     TSH    2. Essential hypertension  -     CBC & Differential  -     Comprehensive Metabolic Panel  -     Lipid Panel With / Chol / HDL Ratio    3. Anxiety associated with depression  -     TSH    4. Hyperglycemia  -     Comprehensive Metabolic Panel  -     Hemoglobin A1c    5. Colon cancer screening  -     Cologuard - Stool, Per Rectum; Future    labs as noted  Follow heart healthy/low salt diet  Avoid processed foods  Monitor blood pressure as discussed  Exercise as tolerated up to 30 minutes 5 days per week  Take all medications as prescribed  Given info on advance directives, request copy when available  She declines all vaccines  cologuard ordered  She will schedule mammogram for April    Follow Up:  Return in about 4 months (around 5/30/2020) for Next scheduled follow up.     An After Visit Summary and PPPS were given to the patient.

## 2020-03-30 RX ORDER — HYDROXYZINE HYDROCHLORIDE 25 MG/1
25 TABLET, FILM COATED ORAL EVERY 8 HOURS PRN
Qty: 60 TABLET | Refills: 5 | Status: SHIPPED | OUTPATIENT
Start: 2020-03-30 | End: 2020-10-02 | Stop reason: SDUPTHER

## 2020-03-30 RX ORDER — LOSARTAN POTASSIUM 50 MG/1
50 TABLET ORAL DAILY
Qty: 60 TABLET | Refills: 5 | Status: SHIPPED | OUTPATIENT
Start: 2020-03-30 | End: 2020-08-27 | Stop reason: SDUPTHER

## 2020-04-30 RX ORDER — CARVEDILOL 25 MG/1
25 TABLET ORAL 2 TIMES DAILY WITH MEALS
Qty: 60 TABLET | Refills: 5 | Status: SHIPPED | OUTPATIENT
Start: 2020-04-30 | End: 2020-10-02 | Stop reason: SDUPTHER

## 2020-06-19 RX ORDER — CLONIDINE 0.2 MG/24H
1 PATCH, EXTENDED RELEASE TRANSDERMAL WEEKLY
Qty: 4 PATCH | Refills: 5 | Status: SHIPPED | OUTPATIENT
Start: 2020-06-19 | End: 2020-08-06 | Stop reason: HOSPADM

## 2020-08-03 ENCOUNTER — APPOINTMENT (OUTPATIENT)
Dept: ULTRASOUND IMAGING | Facility: HOSPITAL | Age: 70
End: 2020-08-03

## 2020-08-03 ENCOUNTER — APPOINTMENT (OUTPATIENT)
Dept: CT IMAGING | Facility: HOSPITAL | Age: 70
End: 2020-08-03

## 2020-08-03 ENCOUNTER — HOSPITAL ENCOUNTER (OUTPATIENT)
Facility: HOSPITAL | Age: 70
Setting detail: OBSERVATION
Discharge: NURSING, REHAB OR LONG-TERM CARE | End: 2020-08-06
Attending: EMERGENCY MEDICINE | Admitting: FAMILY MEDICINE

## 2020-08-03 ENCOUNTER — APPOINTMENT (OUTPATIENT)
Dept: CARDIOLOGY | Facility: HOSPITAL | Age: 70
End: 2020-08-03

## 2020-08-03 ENCOUNTER — APPOINTMENT (OUTPATIENT)
Dept: MRI IMAGING | Facility: HOSPITAL | Age: 70
End: 2020-08-03

## 2020-08-03 DIAGNOSIS — I16.0 HYPERTENSIVE URGENCY: Primary | ICD-10-CM

## 2020-08-03 LAB
ALBUMIN SERPL-MCNC: 4.4 G/DL (ref 3.5–5.2)
ALBUMIN/GLOB SERPL: 1.3 G/DL
ALP SERPL-CCNC: 133 U/L (ref 39–117)
ALT SERPL W P-5'-P-CCNC: 13 U/L (ref 1–33)
ANION GAP SERPL CALCULATED.3IONS-SCNC: 12.4 MMOL/L (ref 5–15)
ANION GAP SERPL CALCULATED.3IONS-SCNC: 12.4 MMOL/L (ref 5–15)
AST SERPL-CCNC: 14 U/L (ref 1–32)
BASOPHILS # BLD AUTO: 0.03 10*3/MM3 (ref 0–0.2)
BASOPHILS # BLD AUTO: 0.05 10*3/MM3 (ref 0–0.2)
BASOPHILS NFR BLD AUTO: 0.3 % (ref 0–1.5)
BASOPHILS NFR BLD AUTO: 0.5 % (ref 0–1.5)
BILIRUB SERPL-MCNC: 0.2 MG/DL (ref 0–1.2)
BUN SERPL-MCNC: 10 MG/DL (ref 8–23)
BUN SERPL-MCNC: 11 MG/DL (ref 8–23)
BUN/CREAT SERPL: 14.9 (ref 7–25)
BUN/CREAT SERPL: 15.2 (ref 7–25)
CALCIUM SPEC-SCNC: 9.2 MG/DL (ref 8.6–10.5)
CALCIUM SPEC-SCNC: 9.6 MG/DL (ref 8.6–10.5)
CHLORIDE SERPL-SCNC: 102 MMOL/L (ref 98–107)
CHLORIDE SERPL-SCNC: 104 MMOL/L (ref 98–107)
CHOLEST SERPL-MCNC: 139 MG/DL (ref 0–200)
CO2 SERPL-SCNC: 23.6 MMOL/L (ref 22–29)
CO2 SERPL-SCNC: 25.6 MMOL/L (ref 22–29)
CREAT SERPL-MCNC: 0.66 MG/DL (ref 0.57–1)
CREAT SERPL-MCNC: 0.74 MG/DL (ref 0.57–1)
DEPRECATED RDW RBC AUTO: 40.9 FL (ref 37–54)
DEPRECATED RDW RBC AUTO: 41.6 FL (ref 37–54)
EOSINOPHIL # BLD AUTO: 0 10*3/MM3 (ref 0–0.4)
EOSINOPHIL # BLD AUTO: 0.13 10*3/MM3 (ref 0–0.4)
EOSINOPHIL NFR BLD AUTO: 0 % (ref 0.3–6.2)
EOSINOPHIL NFR BLD AUTO: 1.4 % (ref 0.3–6.2)
ERYTHROCYTE [DISTWIDTH] IN BLOOD BY AUTOMATED COUNT: 12.6 % (ref 12.3–15.4)
ERYTHROCYTE [DISTWIDTH] IN BLOOD BY AUTOMATED COUNT: 12.8 % (ref 12.3–15.4)
GFR SERPL CREATININE-BSD FRML MDRD: 78 ML/MIN/1.73
GFR SERPL CREATININE-BSD FRML MDRD: 89 ML/MIN/1.73
GLOBULIN UR ELPH-MCNC: 3.5 GM/DL
GLUCOSE SERPL-MCNC: 154 MG/DL (ref 65–99)
GLUCOSE SERPL-MCNC: 165 MG/DL (ref 65–99)
HBA1C MFR BLD: 6.3 % (ref 4.8–5.6)
HCT VFR BLD AUTO: 46.5 % (ref 34–46.6)
HCT VFR BLD AUTO: 47.9 % (ref 34–46.6)
HDLC SERPL-MCNC: 36 MG/DL (ref 40–60)
HGB BLD-MCNC: 16.3 G/DL (ref 12–15.9)
HGB BLD-MCNC: 17 G/DL (ref 12–15.9)
IMM GRANULOCYTES # BLD AUTO: 0.02 10*3/MM3 (ref 0–0.05)
IMM GRANULOCYTES # BLD AUTO: 0.04 10*3/MM3 (ref 0–0.05)
IMM GRANULOCYTES NFR BLD AUTO: 0.2 % (ref 0–0.5)
IMM GRANULOCYTES NFR BLD AUTO: 0.4 % (ref 0–0.5)
INR PPP: 0.93 (ref 0.9–1.1)
LDLC SERPL CALC-MCNC: 83 MG/DL (ref 0–100)
LDLC/HDLC SERPL: 2.32 {RATIO}
LYMPHOCYTES # BLD AUTO: 1.79 10*3/MM3 (ref 0.7–3.1)
LYMPHOCYTES # BLD AUTO: 3.21 10*3/MM3 (ref 0.7–3.1)
LYMPHOCYTES NFR BLD AUTO: 17.3 % (ref 19.6–45.3)
LYMPHOCYTES NFR BLD AUTO: 34 % (ref 19.6–45.3)
MAGNESIUM SERPL-MCNC: 2.3 MG/DL (ref 1.6–2.4)
MAXIMAL PREDICTED HEART RATE: 152 BPM
MCH RBC QN AUTO: 30.9 PG (ref 26.6–33)
MCH RBC QN AUTO: 31.6 PG (ref 26.6–33)
MCHC RBC AUTO-ENTMCNC: 35.1 G/DL (ref 31.5–35.7)
MCHC RBC AUTO-ENTMCNC: 35.5 G/DL (ref 31.5–35.7)
MCV RBC AUTO: 88.2 FL (ref 79–97)
MCV RBC AUTO: 89 FL (ref 79–97)
MONOCYTES # BLD AUTO: 0.36 10*3/MM3 (ref 0.1–0.9)
MONOCYTES # BLD AUTO: 0.52 10*3/MM3 (ref 0.1–0.9)
MONOCYTES NFR BLD AUTO: 3.5 % (ref 5–12)
MONOCYTES NFR BLD AUTO: 5.5 % (ref 5–12)
NEUTROPHILS NFR BLD AUTO: 5.5 10*3/MM3 (ref 1.7–7)
NEUTROPHILS NFR BLD AUTO: 58.4 % (ref 42.7–76)
NEUTROPHILS NFR BLD AUTO: 78.5 % (ref 42.7–76)
NEUTROPHILS NFR BLD AUTO: 8.13 10*3/MM3 (ref 1.7–7)
NRBC BLD AUTO-RTO: 0 /100 WBC (ref 0–0.2)
NRBC BLD AUTO-RTO: 0 /100 WBC (ref 0–0.2)
PLATELET # BLD AUTO: 258 10*3/MM3 (ref 140–450)
PLATELET # BLD AUTO: 263 10*3/MM3 (ref 140–450)
PMV BLD AUTO: 9.1 FL (ref 6–12)
PMV BLD AUTO: 9.6 FL (ref 6–12)
POTASSIUM SERPL-SCNC: 3.4 MMOL/L (ref 3.5–5.2)
POTASSIUM SERPL-SCNC: 3.7 MMOL/L (ref 3.5–5.2)
PROT SERPL-MCNC: 7.9 G/DL (ref 6–8.5)
PROTHROMBIN TIME: 12.8 SECONDS (ref 12–15.1)
RBC # BLD AUTO: 5.27 10*6/MM3 (ref 3.77–5.28)
RBC # BLD AUTO: 5.38 10*6/MM3 (ref 3.77–5.28)
SODIUM SERPL-SCNC: 140 MMOL/L (ref 136–145)
SODIUM SERPL-SCNC: 140 MMOL/L (ref 136–145)
STRESS TARGET HR: 129 BPM
TRIGL SERPL-MCNC: 98 MG/DL (ref 0–150)
TROPONIN T SERPL-MCNC: <0.01 NG/ML (ref 0–0.03)
VLDLC SERPL-MCNC: 19.6 MG/DL
WBC # BLD AUTO: 10.35 10*3/MM3 (ref 3.4–10.8)
WBC # BLD AUTO: 9.43 10*3/MM3 (ref 3.4–10.8)

## 2020-08-03 PROCEDURE — 83735 ASSAY OF MAGNESIUM: CPT | Performed by: EMERGENCY MEDICINE

## 2020-08-03 PROCEDURE — 97165 OT EVAL LOW COMPLEX 30 MIN: CPT

## 2020-08-03 PROCEDURE — 99204 OFFICE O/P NEW MOD 45 MIN: CPT | Performed by: INTERNAL MEDICINE

## 2020-08-03 PROCEDURE — 96374 THER/PROPH/DIAG INJ IV PUSH: CPT

## 2020-08-03 PROCEDURE — 80053 COMPREHEN METABOLIC PANEL: CPT | Performed by: EMERGENCY MEDICINE

## 2020-08-03 PROCEDURE — 93306 TTE W/DOPPLER COMPLETE: CPT | Performed by: INTERNAL MEDICINE

## 2020-08-03 PROCEDURE — G0378 HOSPITAL OBSERVATION PER HR: HCPCS

## 2020-08-03 PROCEDURE — 93306 TTE W/DOPPLER COMPLETE: CPT

## 2020-08-03 PROCEDURE — 96376 TX/PRO/DX INJ SAME DRUG ADON: CPT

## 2020-08-03 PROCEDURE — 80061 LIPID PANEL: CPT | Performed by: INTERNAL MEDICINE

## 2020-08-03 PROCEDURE — 70551 MRI BRAIN STEM W/O DYE: CPT

## 2020-08-03 PROCEDURE — 93880 EXTRACRANIAL BILAT STUDY: CPT

## 2020-08-03 PROCEDURE — 85025 COMPLETE CBC W/AUTO DIFF WBC: CPT | Performed by: EMERGENCY MEDICINE

## 2020-08-03 PROCEDURE — 96372 THER/PROPH/DIAG INJ SC/IM: CPT

## 2020-08-03 PROCEDURE — 84484 ASSAY OF TROPONIN QUANT: CPT | Performed by: EMERGENCY MEDICINE

## 2020-08-03 PROCEDURE — 25010000002 HYDRALAZINE PER 20 MG: Performed by: INTERNAL MEDICINE

## 2020-08-03 PROCEDURE — 25010000002 HYDRALAZINE PER 20 MG: Performed by: EMERGENCY MEDICINE

## 2020-08-03 PROCEDURE — 70450 CT HEAD/BRAIN W/O DYE: CPT

## 2020-08-03 PROCEDURE — 97161 PT EVAL LOW COMPLEX 20 MIN: CPT

## 2020-08-03 PROCEDURE — 83036 HEMOGLOBIN GLYCOSYLATED A1C: CPT | Performed by: INTERNAL MEDICINE

## 2020-08-03 PROCEDURE — 93005 ELECTROCARDIOGRAM TRACING: CPT | Performed by: EMERGENCY MEDICINE

## 2020-08-03 PROCEDURE — 96375 TX/PRO/DX INJ NEW DRUG ADDON: CPT

## 2020-08-03 PROCEDURE — 85610 PROTHROMBIN TIME: CPT | Performed by: EMERGENCY MEDICINE

## 2020-08-03 PROCEDURE — 25010000002 ENOXAPARIN PER 10 MG: Performed by: INTERNAL MEDICINE

## 2020-08-03 PROCEDURE — 25010000002 LORAZEPAM PER 2 MG: Performed by: EMERGENCY MEDICINE

## 2020-08-03 PROCEDURE — 99285 EMERGENCY DEPT VISIT HI MDM: CPT

## 2020-08-03 PROCEDURE — 99219 PR INITIAL OBSERVATION CARE/DAY 50 MINUTES: CPT | Performed by: INTERNAL MEDICINE

## 2020-08-03 PROCEDURE — 85025 COMPLETE CBC W/AUTO DIFF WBC: CPT | Performed by: INTERNAL MEDICINE

## 2020-08-03 RX ORDER — ACETAMINOPHEN 160 MG/5ML
650 SOLUTION ORAL EVERY 4 HOURS PRN
Status: DISCONTINUED | OUTPATIENT
Start: 2020-08-03 | End: 2020-08-06 | Stop reason: HOSPADM

## 2020-08-03 RX ORDER — CARVEDILOL 25 MG/1
25 TABLET ORAL 2 TIMES DAILY WITH MEALS
Status: DISCONTINUED | OUTPATIENT
Start: 2020-08-03 | End: 2020-08-06 | Stop reason: HOSPADM

## 2020-08-03 RX ORDER — HYDROXYZINE HYDROCHLORIDE 25 MG/1
25 TABLET, FILM COATED ORAL EVERY 8 HOURS PRN
Status: DISCONTINUED | OUTPATIENT
Start: 2020-08-03 | End: 2020-08-06 | Stop reason: HOSPADM

## 2020-08-03 RX ORDER — HYDRALAZINE HYDROCHLORIDE 20 MG/ML
20 INJECTION INTRAMUSCULAR; INTRAVENOUS ONCE
Status: COMPLETED | OUTPATIENT
Start: 2020-08-03 | End: 2020-08-03

## 2020-08-03 RX ORDER — LOSARTAN POTASSIUM 50 MG/1
50 TABLET ORAL DAILY
Status: DISCONTINUED | OUTPATIENT
Start: 2020-08-03 | End: 2020-08-06 | Stop reason: HOSPADM

## 2020-08-03 RX ORDER — SODIUM CHLORIDE 0.9 % (FLUSH) 0.9 %
10 SYRINGE (ML) INJECTION AS NEEDED
Status: DISCONTINUED | OUTPATIENT
Start: 2020-08-03 | End: 2020-08-06 | Stop reason: HOSPADM

## 2020-08-03 RX ORDER — ATORVASTATIN CALCIUM 40 MG/1
40 TABLET, FILM COATED ORAL NIGHTLY
Status: DISCONTINUED | OUTPATIENT
Start: 2020-08-03 | End: 2020-08-06 | Stop reason: HOSPADM

## 2020-08-03 RX ORDER — SODIUM CHLORIDE 0.9 % (FLUSH) 0.9 %
10 SYRINGE (ML) INJECTION EVERY 12 HOURS SCHEDULED
Status: DISCONTINUED | OUTPATIENT
Start: 2020-08-03 | End: 2020-08-06 | Stop reason: HOSPADM

## 2020-08-03 RX ORDER — ASPIRIN 81 MG/1
81 TABLET, CHEWABLE ORAL DAILY
Status: DISCONTINUED | OUTPATIENT
Start: 2020-08-03 | End: 2020-08-06 | Stop reason: HOSPADM

## 2020-08-03 RX ORDER — BISACODYL 10 MG
10 SUPPOSITORY, RECTAL RECTAL DAILY PRN
Status: DISCONTINUED | OUTPATIENT
Start: 2020-08-03 | End: 2020-08-06 | Stop reason: HOSPADM

## 2020-08-03 RX ORDER — NICOTINE 21 MG/24HR
1 PATCH, TRANSDERMAL 24 HOURS TRANSDERMAL
Status: DISCONTINUED | OUTPATIENT
Start: 2020-08-03 | End: 2020-08-06 | Stop reason: HOSPADM

## 2020-08-03 RX ORDER — CHOLECALCIFEROL (VITAMIN D3) 125 MCG
5 CAPSULE ORAL NIGHTLY PRN
Status: DISCONTINUED | OUTPATIENT
Start: 2020-08-03 | End: 2020-08-06 | Stop reason: HOSPADM

## 2020-08-03 RX ORDER — ACETAMINOPHEN 325 MG/1
650 TABLET ORAL EVERY 4 HOURS PRN
Status: DISCONTINUED | OUTPATIENT
Start: 2020-08-03 | End: 2020-08-06 | Stop reason: HOSPADM

## 2020-08-03 RX ORDER — POTASSIUM CHLORIDE 750 MG/1
40 CAPSULE, EXTENDED RELEASE ORAL ONCE
Status: COMPLETED | OUTPATIENT
Start: 2020-08-03 | End: 2020-08-03

## 2020-08-03 RX ORDER — ONDANSETRON 2 MG/ML
4 INJECTION INTRAMUSCULAR; INTRAVENOUS EVERY 6 HOURS PRN
Status: DISCONTINUED | OUTPATIENT
Start: 2020-08-03 | End: 2020-08-06 | Stop reason: HOSPADM

## 2020-08-03 RX ORDER — ACETAMINOPHEN 650 MG/1
650 SUPPOSITORY RECTAL EVERY 4 HOURS PRN
Status: DISCONTINUED | OUTPATIENT
Start: 2020-08-03 | End: 2020-08-06 | Stop reason: HOSPADM

## 2020-08-03 RX ORDER — CLONIDINE 0.2 MG/24H
1 PATCH, EXTENDED RELEASE TRANSDERMAL WEEKLY
Status: DISCONTINUED | OUTPATIENT
Start: 2020-08-03 | End: 2020-08-03

## 2020-08-03 RX ORDER — HYDRALAZINE HYDROCHLORIDE 20 MG/ML
10 INJECTION INTRAMUSCULAR; INTRAVENOUS EVERY 6 HOURS PRN
Status: DISCONTINUED | OUTPATIENT
Start: 2020-08-03 | End: 2020-08-06 | Stop reason: HOSPADM

## 2020-08-03 RX ORDER — LORAZEPAM 2 MG/ML
1 INJECTION INTRAMUSCULAR ONCE
Status: COMPLETED | OUTPATIENT
Start: 2020-08-03 | End: 2020-08-03

## 2020-08-03 RX ADMIN — CLONIDINE 1 PATCH: 0.2 PATCH TRANSDERMAL at 09:40

## 2020-08-03 RX ADMIN — CARVEDILOL 25 MG: 25 TABLET, FILM COATED ORAL at 17:59

## 2020-08-03 RX ADMIN — SODIUM CHLORIDE, PRESERVATIVE FREE 10 ML: 5 INJECTION INTRAVENOUS at 10:00

## 2020-08-03 RX ADMIN — POTASSIUM CHLORIDE 40 MEQ: 10 CAPSULE, COATED, EXTENDED RELEASE ORAL at 09:39

## 2020-08-03 RX ADMIN — HYDRALAZINE HYDROCHLORIDE 10 MG: 20 INJECTION INTRAMUSCULAR; INTRAVENOUS at 16:58

## 2020-08-03 RX ADMIN — CARVEDILOL 25 MG: 25 TABLET, FILM COATED ORAL at 09:41

## 2020-08-03 RX ADMIN — ASPIRIN 81 MG 81 MG: 81 TABLET ORAL at 09:41

## 2020-08-03 RX ADMIN — HYDROXYZINE HYDROCHLORIDE 25 MG: 25 TABLET, FILM COATED ORAL at 09:41

## 2020-08-03 RX ADMIN — LOSARTAN POTASSIUM 50 MG: 50 TABLET, FILM COATED ORAL at 09:41

## 2020-08-03 RX ADMIN — ENOXAPARIN SODIUM 40 MG: 40 INJECTION SUBCUTANEOUS at 04:49

## 2020-08-03 RX ADMIN — LORAZEPAM 1 MG: 2 INJECTION, SOLUTION INTRAMUSCULAR; INTRAVENOUS at 02:21

## 2020-08-03 RX ADMIN — HYDRALAZINE HYDROCHLORIDE 20 MG: 20 INJECTION INTRAMUSCULAR; INTRAVENOUS at 02:09

## 2020-08-03 RX ADMIN — NICOTINE 1 PATCH: 21 PATCH TRANSDERMAL at 09:39

## 2020-08-03 RX ADMIN — SODIUM CHLORIDE, PRESERVATIVE FREE 10 ML: 5 INJECTION INTRAVENOUS at 20:45

## 2020-08-03 RX ADMIN — ATORVASTATIN CALCIUM 40 MG: 40 TABLET, FILM COATED ORAL at 20:44

## 2020-08-03 NOTE — THERAPY EVALUATION
Acute Care - Occupational Therapy Initial Evaluation   [facility]     Patient Name: Crystal Ragsdale  : 1950  MRN: [id number]  Today's Date: 8/3/2020  Onset of Illness/Injury or Date of Surgery: 20  Date of Referral to OT: 20  Referring Physician: Dr. Kruse    Admit Date: 8/3/2020       ICD-10-CM ICD-9-CM   1. Hypertensive urgency I16.0 401.9     Patient Active Problem List   Diagnosis   • Essential hypertension   • Anxiety associated with depression   • Hyperglycemia   • Encounter for Medicare annual wellness exam   • Tobacco abuse counseling   • Chronic pain of left ankle   • Colon cancer screening   • Hypertensive urgency     Past Medical History:   Diagnosis Date   • Cancer (CMS/HCC)     Breast Cancer     Past Surgical History:   Procedure Laterality Date   • BREAST LUMPECTOMY Right 2000   • HYSTERECTOMY            OT ASSESSMENT FLOWSHEET (last 12 hours)      Occupational Therapy Evaluation     Row Name 20 1244                   OT Evaluation Time/Intention    Subjective Information  complains of;weakness  -        Document Type  evaluation  -        Mode of Treatment  occupational therapy  -        Patient Effort  good  -        Symptoms Noted During/After Treatment  none  -           General Information    Patient Profile Reviewed?  yes  -        Onset of Illness/Injury or Date of Surgery  20  -        Referring Physician  Dr. Kruse  -        Patient Observations  alert;cooperative;agree to therapy  -        Patient/Family Observations  Pt alone  -        General Observations of Patient  Pt received sitting upright in bed   -        Prior Level of Function  independent:;community mobility;ADL's  -        Equipment Currently Used at Home  none  -        Existing Precautions/Restrictions  fall  -        Risks Reviewed  patient:;increased discomfort  -        Benefits Reviewed  patient:;improve function;increase independence;increase strength  -            Relationship/Environment    Lives With  alone  -           Resource/Environmental Concerns    Current Living Arrangements  home/apartment/condo  -           Cognitive Assessment/Intervention- PT/OT    Orientation Status (Cognition)  oriented x 4  -        Follows Commands (Cognition)  WFL  -           Safety Issues, Functional Mobility    Impairments Affecting Function (Mobility)  motor control;sensation/sensory awareness  -           Bed Mobility Assessment/Treatment    Bed Mobility Assessment/Treatment  supine-sit;sit-supine  -        Supine-Sit [place] (Bed Mobility)  conditional independence  -        Sit-Supine [place] (Bed Mobility)  conditional independence  -        Assistive Device (Bed Mobility)  head of bed elevated  -           Functional Mobility    Functional Mobility- Ind. Level  minimum assist (75% patient effort);2 person assist required  -        Functional Mobility- Device  other (see comments) HHA of 2 and gait belt  -        Functional Mobility-Distance (Feet)  3  -        Functional Mobility- Comment  Pt walked 3' forward, 3' backward  -           Transfer Assessment/Treatment    Transfer Assessment/Treatment  sit-stand transfer;stand-sit transfer  -           Sit-Stand Transfer    Sit-Stand [place] (Transfers)  minimum assist (75% patient effort)  -           Stand-Sit Transfer    Stand-Sit [place] (Transfers)  minimum assist (75% patient effort)  -           ADL Assessment/Intervention    BADL Assessment/Intervention  bathing;upper body dressing;lower body dressing;grooming;feeding;toileting  -           Bathing Assessment/Intervention    Bathing [place] Level  minimum assist (75% patient effort)  -           Upper Body Dressing Assessment/Training    Upper Body Dressing [place] Level  minimum assist (75% patient effort)  -           Lower Body Dressing Assessment/Training    Lower Body Dressing [place] Level   minimum assist (75% patient effort)  -           Grooming Assessment/Training    [place] Level (Grooming)  minimum assist (75% patient effort)  -           Self-Feeding Assessment/Training    [place] Level (Feeding)  set up  -           Toileting Assessment/Training    [place] Level (Toileting)  minimum assist (75% patient effort)  -           BADL Safety/Performance    Impairments, BADL Safety/Performance  balance;endurance/activity tolerance;grasp/prehension;coordination;motor control;strength  -           General ROM    GENERAL ROM COMMENTS  BUE WFL  -           MMT (Manual Muscle Testing)    General MMT Comments  RUE WFL, CINDYE 4-/5  -           Positioning and Restraints    Pre-Treatment Position  in bed  -        Post Treatment Position  bed  -        In Bed  supine;call light within reach;encouraged to call for assist  -           Pain Assessment    Additional Documentation  Pain Scale: Numbers Pre/Post-Treatment (Group)  -           Pain Scale: Numbers Pre/Post-Treatment    Pain Scale: Numbers, Pretreatment  0/10 - no pain  -        Pain Scale: Numbers, Post-Treatment  0/10 - no pain  [facility]           Coping    Observed Emotional State  accepting;cooperative  -        Verbalized Emotional State  acceptance  -           Plan of Care Review    Plan of Care Reviewed With  patient  -        Progress  no change  -        Outcome Summary  Pt seen for OT evaluation today.  Pt presents with weakness and decreased independence with self care and functional mobility tasks. Pt is expected to benefit from skilled OT to improve her strength and independence with ADL tasks.  -           Clinical Impression (OT)    Date of Referral to OT  08/03/20  -        OT Diagnosis  ADL decline  -        Patient/Family Goals Statement (OT Eval)  Pt wants to d/c home, but is agreeable to consider rehab  -        Criteria for Skilled Therapeutic Interventions Met (OT Eval)  yes;treatment  indicated  -        Rehab Potential (OT Eval)  good, to achieve stated therapy goals  -        Therapy Frequency (OT Eval)  3 times/wk  -        Care Plan Review (OT)  evaluation/treatment results reviewed;patient/other agree to care plan  -        Anticipated Discharge Disposition (OT)  inpatient rehabilitation facility  -           OT Goals    Transfer Goal Selection (OT)  transfer, OT goal 1  -        Bathing Goal Selection (OT)  bathing, OT goal 1  -AH        Dressing Goal Selection (OT)  dressing, OT goal 1  -        Strength Goal Selection (OT)  strength, OT goal 1  -        Functional Mobility Goal Selection (OT)  functional mobility, OT goal 1  -        Additional Documentation  Strength Goal Selection (OT) (Row);Functional Mobility Selection (OT) (Row)  -           Transfer Goal 1 (OT)    Activity/Assistive Device (Transfer Goal 1, OT)  sit-to-stand/stand-to-sit;walker, rolling  -        [place] Level/Cues Needed (Transfer Goal 1, OT)  supervision required  -        Time Frame (Transfer Goal 1, OT)  by discharge  -        Progress/Outcome (Transfer Goal 1, OT)  goal ongoing  -           Bathing Goal 1 (OT)    Activity/Assistive Device (Bathing Goal 1, OT)  bathing skills, all  -AH        [place] Level/Cues Needed (Bathing Goal 1, OT)  supervision required  -        Time Frame (Bathing Goal 1, OT)  by discharge  -        Progress/Outcomes (Bathing Goal 1, OT)  goal ongoing  -           Dressing Goal 1 (OT)    Activity/Assistive Device (Dressing Goal 1, OT)  dressing skills, all  -        [place]/Cues Needed (Dressing Goal 1, OT)  contact guard assist  -        Time Frame (Dressing Goal 1, OT)  by discharge  -        Progress/Outcome (Dressing Goal 1, OT)  goal ongoing  -           Strength Goal 1 (OT)    Strength Goal 1 (OT)  Pt will perform UB strengthening ex using theraband for resistance.  -        Time Frame (Strength Goal 1, OT)  by discharge   -        Progress/Outcome (Strength Goal 1, OT)  goal ongoing  -           Functional Mobility Goal 1 (OT)    Activity/Assistive Device (Functional Mobility Goal 1, OT)  walker, rolling  -        [place] Level/Cues Needed (Functional Mobility Goal 1, OT)  contact guard assist  -        Distance Goal 1 (Functional Mobility, OT)  50  -        Time Frame (Functional Mobility Goal 1, OT)  long term goal (LTG);10 days  -        Progress/Outcome (Functional Mobility Goal 1, OT)  goal ongoing  -          User Key  (r) = Recorded By, (t) = Taken By, (c) = Cosigned By    Initials Name Effective Dates     Roseline Monroy 03/07/18 -          Occupational Therapy Education                 Title: PT OT SLP Therapies (In Progress)     Topic: Occupational Therapy (In Progress)     Point: ADL training (Done)     Description:   Instruct learner(s) on proper safety adaptation and remediation techniques during self care or transfers.   Instruct in proper use of assistive devices.              Learning Progress Summary           Patient Acceptance, E,TB, VU by  at 8/3/2020 9341    Comment:  Role of OT/POC                   Point: Home exercise program (Not Started)     Description:   Instruct learner(s) on appropriate technique for monitoring, assisting and/or progressing therapeutic exercises/activities.              Learner Progress:   Not documented in this visit.          Point: Precautions (Not Started)     Description:   Instruct learner(s) on prescribed precautions during self-care and functional transfers.              Learner Progress:   Not documented in this visit.          Point: Body mechanics (Not Started)     Description:   Instruct learner(s) on proper positioning and spine alignment during self-care, functional mobility activities and/or exercises.              Learner Progress:   Not documented in this visit.                      User Key     Initials Effective Dates Name Provider Type Discipline      03/07/18 -  Roseline Monroy Occupational Therapist OT                  OT Recommendation and Plan  Outcome Summary/Treatment Plan (OT)  Anticipated Discharge Disposition (OT): inpatient rehabilitation facility  Therapy Frequency (OT Eval): 3 times/wk  Plan of Care Review  Plan of Care Reviewed With: patient  Plan of Care Reviewed With: patient  Outcome Summary: Pt seen for OT evaluation today.  Pt presents with weakness and decreased independence with self care and functional mobility tasks. Pt is expected to benefit from skilled OT to improve her strength and independence with ADL tasks.    Outcome Measures     Row Name 08/03/20 1244             How much help from another is currently needed...    Putting on and taking off regular lower body clothing?  3  -AH      Bathing (including washing, rinsing, and drying)  3  -AH      Toileting (which includes using toilet bed pan or urinal)  3  -AH      Putting on and taking off regular upper body clothing  3  -AH      Taking care of personal grooming (such as brushing teeth)  3  -AH      Eating meals  3  -AH      AM-PAC 6 Clicks Score (OT)  18  -         Functional Assessment    Outcome Measure Options  AM-PAC 6 Clicks Daily Activity (OT)  -        User Key  (r) = Recorded By, (t) = Taken By, (c) = Cosigned By    Initials Name Provider Type    Roseline Ramírez Occupational Therapist          Time Calculation:   Time Calculation- OT     Row Name 08/03/20 1423             Time Calculation- OT    OT Start Time  1244  -      OT Received On  08/03/20  -      OT Goal Re-Cert Due Date  08/13/20  -        User Key  (r) = Recorded By, (t) = Taken By, (c) = Cosigned By    Initials Name Provider Type    Roseline Ramírez Occupational Therapist        Therapy Charges for Today     Code Description Service Date Service Provider Modifiers Qty    [id number]  OT EVAL LOW COMPLEXITY 3 8/3/2020 Roseline Monroy GO 1               Roseline Monroy  8/3/2020

## 2020-08-03 NOTE — PLAN OF CARE
Problem: Patient Care Overview  Goal: Plan of Care Review  Outcome: Ongoing (interventions implemented as appropriate)  Note:   Pt admitted from ER with Hypertensive emergency.  VS monitored q 4 hr and prn.  Telemetry monitoring continued.  Labs ordered for the AM.  Pt requires much reassurance and encouragement.

## 2020-08-03 NOTE — PLAN OF CARE
Problem: Patient Care Overview  Goal: Plan of Care Review  Outcome: Ongoing (interventions implemented as appropriate)  Flowsheets (Taken 8/3/2020 7195)  Progress: no change  Plan of Care Reviewed With: patient  Outcome Summary: Pt seen for OT evaluation today.  Pt presents with weakness and decreased independence with self care and functional mobility tasks. Pt is expected to benefit from skilled OT to improve her strength and independence with ADL tasks.

## 2020-08-03 NOTE — PLAN OF CARE
Problem: Patient Care Overview  Goal: Plan of Care Review  Outcome: Ongoing (interventions implemented as appropriate)  Flowsheets (Taken 8/3/2020 1933)  Outcome Summary: Patient participates  well in skilled PT evaluation and demonstrates decreased strength, balance, transfers and gait.  She has decreased strength and coordination in her left leg with resulting knee hyperextension at midstance.  She is expected to benefit from additional PT services while hospitalized and upon discharge to inpatient rehab.

## 2020-08-03 NOTE — CONSULTS
"     [facility] Cardiology Consult Note    Crystal Ragsdale  1950  [id number]  20    Requesting Physician: Efe Jasso DO    Chief Complaint: Left-sided weakness    History of Present Illness:   Mrs. Crystal Ragsdale is a 69 y.o. female who is being seen by Cardiology for evaluation of an acute CVA with suspected underlying embolic etiology.  The patient has a past medical history significant for chronic tobacco use with 1 pack/day use, hypertension, and anxiety/depression.  She does not have any significant past cardiac history.  She presented to [facility] on 8/3 for evaluation of acute left-sided weakness.  Patient reports she was in her usual state of health, until yesterday evening when she awoke from a nap with a \"heaviness\" in her left lower leg.  She then noted difficulty with ambulation due to her left-sided weakness.  She also notes associated left upper extremity weakness.  No chest pain or exertional dyspnea.  She does note a mild diffuse headache associated with her left-sided weakness.  Given symptoms, she called EMS for evaluation.     At the time of initial EMS evaluation, she was severely hypertensive with initial /123 mmHg.  Upon arrival to [facility], she remained hypertensive with initial BP of 236/141 mmHg.  The patient was given hydralazine and a clonidine patch, with subsequent improvement in BP control.  A subsequent MRI revealed acute bilateral frontal lobe infarcts.  Medical management was recommended by neurology.  Given concern for underlying cardioembolic etiology, Cardiology has been consulted for further recommendations.      Prior Cardiac Testin. Last Coronary Angio: None  2. Prior Stress Testing: None  3. Last Echo: Pending  4. Prior Holter Monitor: None    Review of Systems:   Review of Systems   Constitutional: Negative for activity change, appetite change, chills, diaphoresis, fatigue, fever, unexpected weight gain and " unexpected weight loss.   Eyes: Negative for blurred vision and double vision.   Respiratory: Negative for cough, chest tightness, shortness of breath and wheezing.    Cardiovascular: Negative for chest pain, palpitations and leg swelling.   Gastrointestinal: Negative for abdominal pain, anal bleeding, blood in stool and GERD.   Endocrine: Negative for cold intolerance and heat intolerance.   Genitourinary: Negative for hematuria.   Musculoskeletal: Positive for gait problem.   Neurological: Positive for weakness. Negative for dizziness, syncope and light-headedness.   Hematological: Does not bruise/bleed easily.   Psychiatric/Behavioral: Negative for depressed mood and stress. The patient is not nervous/anxious.        Past Medical History:   Past Medical History:   Diagnosis Date   • Cancer (CMS/[facility])     Breast Cancer       Past Surgical History:   Past Surgical History:   Procedure Laterality Date   • BREAST LUMPECTOMY Right 2000   • HYSTERECTOMY  1994       Family History:   Family History   Problem Relation Age of Onset   • No Known Problems Mother    • No Known Problems Father    • No Known Problems Sister    • No Known Problems Brother        Social History:   Social History     Socioeconomic History   • Marital status:      Spouse name: Not on file   • Number of children: Not on file   • Years of education: Not on file   • Highest education level: Not on file   Tobacco Use   • Smoking status: Current Every Day Smoker     Packs/day: 1.00   • Smokeless tobacco: Never Used   Substance and Sexual Activity   • Alcohol use: No   • Drug use: No   • Sexual activity: Defer       Medications:     Current Facility-Administered Medications:   •  acetaminophen (TYLENOL) tablet 650 mg, 650 mg, Oral, Q4H PRN **OR** acetaminophen (TYLENOL) 160 MG/5ML solution 650 mg, 650 mg, Oral, Q4H PRN **OR** acetaminophen (TYLENOL) suppository 650 mg, 650 mg, Rectal, Q4H PRN, Efe Jasso DO  •  aspirin chewable  tablet 81 mg, 81 mg, Oral, Daily, Efe Jasso, , 81 mg at 08/03/20 0941  •  bisacodyl (DULCOLAX) suppository 10 mg, 10 mg, Rectal, Daily PRN, Efe Jasso, DO  •  carvedilol (COREG) tablet 25 mg, 25 mg, Oral, BID With Meals, Efe Jasso, DO, 25 mg at 08/03/20 0941  •  cloNIDine (CATAPRES-TTS) 0.2 MG/24HR patch 1 patch, 1 patch, Transdermal, Weekly, Efe Jasso, , 1 patch at 08/03/20 0940  •  [START ON 8/4/2020] enoxaparin (LOVENOX) syringe 60 mg, 60 mg, Subcutaneous, Q24H, Efe Jasso, DO  •  hydrALAZINE (APRESOLINE) injection 10 mg, 10 mg, Intravenous, Q6H PRN, Efe Jasso, DO  •  hydrOXYzine (ATARAX) tablet 25 mg, 25 mg, Oral, Q8H PRN, Efe Jasso, , 25 mg at 08/03/20 0941  •  losartan (COZAAR) tablet 50 mg, 50 mg, Oral, Daily, Efe Jasso, DO, 50 mg at 08/03/20 0941  •  melatonin tablet 5 mg, 5 mg, Oral, Nightly PRN, Efe Jasso, DO  •  nicotine (NICODERM CQ) 21 MG/24HR patch 1 patch, 1 patch, Transdermal, Q24H, Efe Jasso, , 1 patch at 08/03/20 0939  •  ondansetron (ZOFRAN) injection 4 mg, 4 mg, Intravenous, Q6H PRN, Efe Jasso DO  •  [COMPLETED] Insert peripheral IV, , , Once **AND** sodium chloride 0.9 % flush 10 mL, 10 mL, Intravenous, PRN, Deng Taylor, DO  •  sodium chloride 0.9 % flush 10 mL, 10 mL, Intravenous, Q12H, Eef Jasso DO, 10 mL at 08/03/20 1000  •  sodium chloride 0.9 % flush 10 mL, 10 mL, Intravenous, PRN, Efe Jasso DO    Allergies:   No Known Allergies    Physical Exam:  Vital Signs:   Vitals:    08/03/20 0550 08/03/20 0742 08/03/20 0933 08/03/20 1218   BP:  (!) 189/90  Comment: was on  thewith daughter,she got worked up,will recheck 148/82 148/89   BP Location:  Left arm Left arm Left arm   Patient Position:  Sitting Lying Sitting   Pulse:   67 68   Resp:  17  17   Temp:    97.6 °F (36.4 °C)   TempSrc:  Oral  Oral   SpO2:   94%  98%   Weight: 103 kg (227 lb 4.7 oz)      Height:           Physical Exam   Constitutional: She is oriented to person, place, and time. She appears well-developed and well-nourished. No distress.   HENT:   Head: Normocephalic and atraumatic.   Moist Mucous Membranes.    Eyes: Pupils are equal, round, and reactive to light. EOM are normal. No scleral icterus.   Neck: No tracheal deviation present.   Cardiovascular: Normal rate, regular rhythm, normal heart sounds and intact distal pulses. Exam reveals no gallop and no friction rub.   No murmur heard.  Normal JVD.   Pulmonary/Chest: Effort normal and breath sounds normal. No stridor. No respiratory distress. She has no wheezes. She has no rales. She exhibits no tenderness.   Abdominal: Soft. Bowel sounds are normal. She exhibits no distension. There is no tenderness. There is no rebound and no guarding.   Musculoskeletal: Normal range of motion. She exhibits no edema.   Lymphadenopathy:     She has no cervical adenopathy.   Neurological: She is alert and oriented to person, place, and time.   Mild left upper and lower extremity weakness.   Skin: Skin is warm and dry. She is not diaphoretic. No erythema.   Psychiatric: She has a normal mood and affect. Her behavior is normal.   Nursing note and vitals reviewed.      Results Review:   Results from last 7 days   Lab Units 08/03/20  0615 08/03/20  0028   SODIUM mmol/L 140 140   POTASSIUM mmol/L 3.4* 3.7   CHLORIDE mmol/L 104 102   CO2 mmol/L 23.6 25.6   BUN mg/dL 10 11   CREATININE mg/dL 0.66 0.74   CALCIUM mg/dL 9.2 9.6   BILIRUBIN mg/dL  --  0.2   ALK PHOS U/L  --  133*   ALT (SGPT) U/L  --  13   AST (SGOT) U/L  --  14   GLUCOSE mg/dL 165* 154*     Results from last 7 days   Lab Units 08/03/20  0028   TROPONIN T ng/mL <0.010     Results from last 7 days   Lab Units 08/03/20  0615 08/03/20  0028   WBC 10*3/mm3 10.35 9.43   HEMOGLOBIN g/dL 16.3* 17.0*   HEMATOCRIT % 46.5 47.9*   PLATELETS 10*3/mm3 258 263     Results  from last 7 days   Lab Units 08/03/20  0028   INR  0.93     Results from last 7 days   Lab Units 08/03/20  0028   MAGNESIUM mg/dL 2.3     Results from last 7 days   Lab Units 08/03/20  0615   CHOLESTEROL mg/dL 139   TRIGLYCERIDES mg/dL 98   HDL CHOL mg/dL 36*   LDL CHOL mg/dL 83       I personally viewed and interpreted the patient's EKG/Telemetry data     Assessment / Plan:     1.  Acute CVA  --MRI imaging with bilateral frontal lobe infarcts, suspected underlying cardioembolic etiology  --Currently in NSR with no evidence of PAF on telemetry thus far  --Echocardiogram with preserved LV systolic function, no significant valvular abnormalities  --Recommend telemetry monitoring on telemetry while inpatient  --If no PAF identified on inpatient monitoring, would then consider placement of an MCOT on discharge with further consideration of a loop recorder if no underlying atrial fibrillation identified on MCOT    2.  Hypertension  --Uncontrolled on presentation  --Will allow permissive hypertension for 24 hours with as needed hydralazine to maintain systolic BP less than 220 mmHg  --Continue carvedilol and losartan  --Discontinue clonidine patch, will initiate Norvasc tomorrow morning and uptitrate as required for BP control    3.  Chronic tobacco use  --Complete cessation advised      Thank you for allowing me to participate in the care of your patient. Please do not hesitate to contact me with additional questions or concerns.     BREE Sheets MD  Interventional Cardiology   08/03/20  12:28 PM

## 2020-08-03 NOTE — ED NOTES
Dr. Taylor called at this time for Dr. Kruse. Called answered and transferred at this time.     Joe April Mary  08/03/20 0210

## 2020-08-03 NOTE — ED NOTES
With pt's permission, spoke with Nyasia whitfield's sister, updated on POC.      Torie David, RN  08/03/20 0254

## 2020-08-03 NOTE — ED PROVIDER NOTES
Subjective   History of Present Illness    Chief Complaint: Left sided paresthesias  History of Present Illness: 69-year-old female presents with above complaint x1 hour, associated hypertension.  Blood pressure to 245 systolic per EMS.  Associated headache dull aching non-thunderclap, presentation.  Also states that she has a history of anxiety and thinks this may be making it worse. Had a similar episode earlier in the day that resolved shortly after.   Onset: 45 minutes to an hour prior to arrival  Duration: Persistent  Exacerbating / Alleviating factors: None  Associated symptoms: None      Nurses Notes reviewed and agree, including vitals, allergies, social history and prior medical history.     REVIEW OF SYSTEMS: All systems reviewed and not pertinent unless noted.    Positive for: Left-sided paresthesias, dull headache    Negative for: Chest pain palpitations  Review of Systems    Past Medical History:   Diagnosis Date   • Cancer (CMS/[facility])     Breast Cancer       No Known Allergies    Past Surgical History:   Procedure Laterality Date   • BREAST LUMPECTOMY Right 2000   • HYSTERECTOMY  1994       Family History   Problem Relation Age of Onset   • No Known Problems Mother    • No Known Problems Father    • No Known Problems Sister    • No Known Problems Brother        Social History     Socioeconomic History   • Marital status:      Spouse name: Not on file   • Number of children: Not on file   • Years of education: Not on file   • Highest education level: Not on file   Tobacco Use   • Smoking status: Current Every Day Smoker     Packs/day: 1.00   • Smokeless tobacco: Never Used   Substance and Sexual Activity   • Alcohol use: No   • Drug use: No   • Sexual activity: Defer           Objective   Physical Exam    GENERAL APPEARANCE: Well developed, obese 69-year-old white female,  in no acute distress.  VITAL SIGNS: per nursing, reviewed and noted  SKIN: exposed skin with no rashes, ulcerations or  petechiae.  Head: Normocephalic, atraumatic.   EYES: perrla. EOMI.  ENT: Normal voice.  Patient maintained wearing a mask throughout patient encounter due to coronavirus pandemic  LUNGS:  No increased work of breathing. No retractions.   CARDIOVASCULAR:  regular rate and rhythm, no murmurs.  Good Peripheral pulses. Good cap refill to extremities.   ABDOMEN: Soft, nontender, normal bowel sounds. No hernia. No ascites.  MUSCULOSKELETAL:  No tenderness. Full ROM. Strength and tone normal.  NEUROLOGIC: Alert, oriented x 3. No gross deficits. GCS 15.  NIH stroke scale 0.  No cerebellar signs.  No facial droop.  NECK: Supple, symmetric. No tenderness, no masses. Full ROM  Back: full rom, no paraspinal spasm. No CVA tenderness.   PSYCH: appropriate affect.  : no bladder tenderness or distention, no CVA tenderness      Procedures     No attending physician procedures were performed on this patient.    ED Course  ED Course as of Aug 03 0144   Mon Aug 03, 2020   0020 EKG interpreted by me reveals sinus rhythm rate 72.  Low voltage.  Nonspecific T wave changes.  No ectopy.    [PF]   0109 Magnesium: 2.3 [PF]   0109 Protime: 12.8 [PF]   0109 INR: 0.93 [PF]   0109 WBC: 9.43 [PF]   0109 Hemoglobin(!): 17.0 [PF]   0109 Hematocrit(!): 47.9 [PF]   0109 Platelets: 263 [PF]   0109 Glucose(!): 154 [PF]   0109 BUN: 11 [PF]   0109 Creatinine: 0.74 [PF]   0109 Sodium: 140 [PF]   0109 Potassium: 3.7 [PF]   0109 Chloride: 102 [PF]   0109 CO2: 25.6 [PF]   0109 Calcium: 9.6 [PF]   0109 Total Protein: 7.9 [PF]   0109 Albumin: 4.40 [PF]   0109 ALT (SGPT): 13 [PF]   0109 AST (SGOT): 14 [PF]   0109 Alkaline Phosphatase(!): 133 [PF]   0109 Total Bilirubin: 0.2 [PF]      ED Course User Index  [PF] Deng Taylor,       CLINICAL HISTORY:  headaache, hypertensive, left sided numbness  FINDINGS:  No acute intracranial abnormality.  Signed by MD Molina Wayne on 8/3/2020 1:10:49 AM, Eastern Time HILLARY M                                        MDM  69-year-old female presents with left upper extremity paresthesias with a reassuring head CT.  Nontoxic, no meningeal signs, NIH stroke scale 0, blood pressure is improved to 201/104, spontaneously.  Defer blood pressure treatment till CT scan return.  Will discuss with hospitalist for possible admission.     Negative head CT, reassuring labs. Repeated nih stroke scale.   Discussed with Dr. Kruse, will admit to telemetry,  for bp control due to hypertensive urgency. Requested addition of troponin. Will treat bp with hydralazine.   Final diagnoses:   Hypertensive urgency            Deng Taylor, DO  08/03/20 0145

## 2020-08-03 NOTE — THERAPY EVALUATION
Patient Name: Crystal Ragsdale  : 1950    MRN: [id number]                              Today's Date: 8/3/2020       Admit Date: 8/3/2020    Visit Dx:     ICD-10-CM ICD-9-CM   1. Hypertensive urgency I16.0 401.9     Patient Active Problem List   Diagnosis   • Essential hypertension   • Anxiety associated with depression   • Hyperglycemia   • Encounter for Medicare annual wellness exam   • Tobacco abuse counseling   • Chronic pain of left ankle   • Colon cancer screening   • Hypertensive urgency     Past Medical History:   Diagnosis Date   • Cancer (CMS/HCC)     Breast Cancer   • Impaired functional mobility, balance, gait, and endurance      Past Surgical History:   Procedure Laterality Date   • BREAST LUMPECTOMY Right    • HYSTERECTOMY       General Information     Row Name 20 1248          PT Evaluation Time/Intention    Document Type  evaluation  -JR     Mode of Treatment  physical therapy  -     Row Name 20 1248          General Information    Patient Profile Reviewed?  yes  -JR     Prior Level of Function  independent:;community mobility  -     Existing Precautions/Restrictions  fall  -     Row Name 20 1248          Relationship/Environment    Lives With  alone  -     Row Name 20 1248          Resource/Environmental Concerns    Current Living Arrangements  home/apartment/condo  -     Row Name 20 1248          Cognitive Assessment/Intervention- PT/OT    Orientation Status (Cognition)  oriented x 4  -JR     Row Name 20 1248          Safety Issues, Functional Mobility    Impairments Affecting Function (Mobility)  sensation/sensory awareness;motor control  -JR       User Key  (r) = Recorded By, (t) = Taken By, (c) = Cosigned By    Initials Name Provider Type    JR Billie Lawson, PT Physical Therapist        Mobility     Row Name 20 1248          Bed Mobility Assessment/Treatment    Bed Mobility Assessment/Treatment  supine-sit;sit-supine  -JR      Supine-Sit [place] (Bed Mobility)  conditional independence  -JR     Sit-Supine [place] (Bed Mobility)  conditional independence  -JR     Assistive Device (Bed Mobility)  head of bed elevated  -     Row Name 08/03/20 1248          Sit-Stand Transfer    Sit-Stand [place] (Transfers)  minimum assist (75% patient effort)  -     Assistive Device (Sit-Stand Transfers)  -- HHA x 2 and gait belt  -     Row Name 08/03/20 1248          Gait/Stairs Assessment/Training    Gait/Stairs Assessment/Training  gait/ambulation independence;gait/ambulation assistive device  -     [place] Level (Gait)  minimum assist (75% patient effort);2 person assist  -JR     Assistive Device (Gait)  -- HHA x 2 and gait belt  -     Distance in Feet (Gait)  3  -JR     Pattern (Gait)  step-to  -JR     Deviations/Abnormal Patterns (Gait)  left sided deviations  -JR     Bilateral Gait Deviations  heel strike decreased  -JR     Left Sided Gait Deviations  knee hyperextension;heel strike decreased  -JR       User Key  (r) = Recorded By, (t) = Taken By, (c) = Cosigned By    Initials Name Provider Type    JR Billie Lawson, PT Physical Therapist        Obj/Interventions     Row Name 08/03/20 1248          General ROM    GENERAL ROM COMMENTS  BLE=WFL  -[facility] Name 08/03/20 1248          MMT (Manual Muscle Testing)    General MMT Comments  LLE=3-/5 with decreased coordination  -     Row Name 08/03/20 1248          Static Sitting Balance    Level of [place] (Unsupported Sitting, Static Balance)  conditional independence  -     Sitting Position (Unsupported Sitting, Static Balance)  sitting on edge of bed  -     Time Able to Maintain Position (Unsupported Sitting, Static Balance)  4 to 5 minutes  -     Row Name 08/03/20 1248          Dynamic Sitting Balance    Level of [place], Reaches Outside Midline (Sitting, Dynamic Balance)  supervision  -     Sitting Position, Reaches Outside Midline (Sitting, Dynamic  Balance)  sitting on edge of bed  -JR     Row Name 08/03/20 1248          Static Standing Balance    Level of [place] (Supported Standing, Static Balance)  contact guard assist  -JR     Time Able to Maintain Position (Supported Standing, Static Balance)  3 to 4 minutes  -JR     Assistive Device Utilized (Supported Standing, Static Balance)  -- HHA x 2 gait belt  -JR     Row Name 08/03/20 1248          Dynamic Standing Balance    Level of [place], Reaches Outside Midline (Standing, Dynamic Balance)  minimal assist, 75% patient effort  -JR     Time Able to Maintain Position, Reaches Outside Midline (Standing, Dynamic Balance)  4 to 5 minutes  -JR     Assistive Device Utilized (Supported Standing, Dynamic Balance)  -- HHA x 2 and gait belt  -JR       User Key  (r) = Recorded By, (t) = Taken By, (c) = Cosigned By    Initials Name Provider Type    Billie Miguel, PT Physical Therapist        Goals/Plan     Row Name 08/03/20 1248          Transfer Goal 1 (PT)    Activity/Assistive Device (Transfer Goal 1, PT)  sit-to-stand/stand-to-sit;bed-to-chair/chair-to-bed  -JR     [place] Level/Cues Needed (Transfer Goal 1, PT)  supervision required  -JR     Time Frame (Transfer Goal 1, PT)  1 week  -JR     Progress/Outcome (Transfer Goal 1, PT)  goal ongoing  -JR     Row Name 08/03/20 1248          Gait Training Goal 1 (PT)    Activity/Assistive Device (Gait Training Goal 1, PT)  gait (walking locomotion);assistive device use;walker, rolling  -JR     [place] Level (Gait Training Goal 1, PT)  contact guard assist  -JR     Distance (Gait Goal 1, PT)  50  -JR     Time Frame (Gait Training Goal 1, PT)  2 weeks  -JR     Progress/Outcome (Gait Training Goal 1, PT)  goal ongoing  -JR     Row Name 08/03/20 1248          Patient Education Goal (PT)    Activity (Patient Education Goal, PT)  Perform HEP x 15  -JR     [place]/Cues/Accuracy (Memory Goal 2, PT)  demonstrates adequately  -JR     Time Frame (Patient  Education Goal, PT)  10 days  -JR     Progress/Outcome (Patient Education Goal, PT)  goal ongoing  -JR       User Key  (r) = Recorded By, (t) = Taken By, (c) = Cosigned By    Initials Name Provider Type    Billie Miguel, PT Physical Therapist        Clinical Impression     Row Name 08/03/20 1248          Pain Assessment    Additional Documentation  Pain Scale: Numbers Pre/Post-Treatment (Group)  -[facility] Name 08/03/20 1248          Pain Scale: Numbers Pre/Post-Treatment    Pain Scale: Numbers, Pretreatment  0/10 - no pain  -JR     Pain Scale: Numbers, Post-Treatment  0/10 - no pain  -JR     Pain Intervention(s)  Ambulation/increased activity;Repositioned  -     Row Name 08/03/20 1248          Plan of Care Review    Plan of Care Reviewed With  patient  -JR     Row Name 08/03/20 1248          Physical Therapy Clinical Impression    Patient/Family Goals Statement (PT Clinical Impression)  Patient wants to return to normal activity  -JR     Criteria for Skilled Interventions Met (PT Clinical Impression)  yes;treatment indicated  -JR     Rehab Potential (PT Clinical Summary)  good, to achieve stated therapy goals  -[facility] Name 08/03/20 1248          Positioning and Restraints    Pre-Treatment Position  in bed  -JR     Post Treatment Position  bed  -JR     In Bed  supine;call light within reach;encouraged to call for assist  -JR       User Key  (r) = Recorded By, (t) = Taken By, (c) = Cosigned By    Initials Name Provider Type    Billie Miguel, PT Physical Therapist        Outcome Measures     Row Name 08/03/20 1248          How much help from another person do you currently need...    Turning from your back to your side while in flat bed without using bedrails?  4  -JR     Moving from lying on back to sitting on the side of a flat bed without bedrails?  4  -JR     Moving to and from a bed to a chair (including a wheelchair)?  2  -JR     Standing up from a chair using your arms (e.g., wheelchair, bedside  chair)?  2  -JR     Climbing 3-5 steps with a railing?  1  -JR     To walk in hospital room?  2  -JR     AM-PAC 6 Clicks Score (PT)  15  -JR     Row Name 08/03/20 1248          Functional Assessment    Outcome Measure Options  AM-PAC 6 Clicks Basic Mobility (PT)  -       User Key  (r) = Recorded By, (t) = Taken By, (c) = Cosigned By    Initials Name Provider Type    Billie Miguel PT Physical Therapist        Physical Therapy Education                 Title: PT OT SLP Therapies (In Progress)     Topic: Physical Therapy (In Progress)     Point: Mobility training (Done)     Description:   Instruct learner(s) on safety and technique for assisting patient out of bed, chair or wheelchair.  Instruct in the proper use of assistive devices, such as walker, crutches, cane or brace.              Patient Friendly Description:   It's important to get you on your feet again, but we need to do so in a way that is safe for you. Falling has serious consequences, and your personal safety is the most important thing of all.        When it's time to get out of bed, one of us or a family member will sit next to you on the bed to give you support.     If your doctor or nurse tells you to use a walker, crutches, a cane, or a brace, be sure you use it every time you get out of bed, even if you think you don't need it.    Learning Progress Summary           Patient Acceptance, E,TB, VU by  at 8/3/2020 3223    Comment:  Role of PT while hospitalized and upon discharge to inpatient rehab                   Point: Home exercise program (Not Started)     Description:   Instruct learner(s) on appropriate technique for monitoring, assisting and/or progressing patient with therapeutic exercises and activities.              Learner Progress:   Not documented in this visit.          Point: Body mechanics (Not Started)     Description:   Instruct learner(s) on proper positioning and spine alignment for patient and/or caregiver during mobility  tasks and/or exercises.              Learner Progress:   Not documented in this visit.          Point: Precautions (Not Started)     Description:   Instruct learner(s) on prescribed precautions during mobility and gait tasks              Learner Progress:   Not documented in this visit.                      User Key     Initials Effective Dates Name Provider Type Discipline     04/03/18 -  Billie Lawson PT Physical Therapist PT              PT Recommendation and Plan  Planned Therapy Interventions (PT Eval): strengthening, motor coordination training, neuromuscular re-education, balance training, transfer training, gait training, home exercise program, patient/family education  Outcome Summary/Treatment Plan (PT)  Anticipated Discharge Disposition (PT): inpatient rehabilitation facility  Plan of Care Reviewed With: patient  Outcome Summary: Patient participates  well in skilled PT evaluation and demonstrates decreased strength, balance, transfers and gait.  She has decreased strength and coordination in her left leg with resulting knee hyperextension at midstance.  She is expected to benefit from additional PT services while hospitalized and upon discharge to inpatient rehab.     Time Calculation:   PT Charges     Row Name 08/03/20 1603             Time Calculation    Start Time  1248  -      PT Received On  08/03/20  -      PT Goal Re-Cert Due Date  08/13/20  -        User Key  (r) = Recorded By, (t) = Taken By, (c) = Cosigned By    Initials Name Provider Type    Billie Miguel PT Physical Therapist        Therapy Charges for Today     Code Description Service Date Service Provider Modifiers Qty    [id number] HC PT EVAL LOW COMPLEXITY 4 8/3/2020 Billie Lawson, PT GP 1          PT G-Codes  Outcome Measure Options: AM-PAC 6 Clicks Basic Mobility (PT)  AM-PAC 6 Clicks Score (PT): 15  AM-PAC 6 Clicks Score (OT): 18    Billie Lawson PT  8/3/2020

## 2020-08-03 NOTE — PROGRESS NOTES
PROGRESS NOTE        Date of Admission: 8/3/2020  Length of Stay: 0  Primary Care Physician: Vermeesch, Marilyn K, MD    Subjective   Chief Complaint:Follow up uncontrolled hypertension and CVA   HPI: This is a 69-year-old female with a history of hypertension, anxiety, chronic tobacco abuse who presented to the emergency room with complaints of onset of left-sided numbness and weakness.  She been in her normal state of health earlier in the day when she took a nap and upon awakening she felt a heaviness in her left lower extremity.  She denied any visual changes, chest pain, slurred speech.  Upon evaluation in the emergency room her systolic blood pressure was 245.  Her blood pressure was noted to be 247/123.  She was given antihypertensive in the emergency room.  CT of the head was negative for acute process.  Given her symptoms and blood pressure she was admitted to the hospitalist service for further medical management.  Upon admission patient was treated for hypertensive urgency.  2D echo as well as a carotid duplex were ordered.  An MRI of the head was also done which did show findings consistent with acute infarcts involving both frontal lobes.  I did speak with Dr. Pinto with neurology on-call at [facility] in [place] regarding these findings who stated that there was no intervention at this point that can be done as patient is well outside the window for intervention.  She did recommend given that it is in the bilateral frontal lobes to look for an arrhythmia or cardiac cause.  Dr. Sheets with cardiology has been consulted.  Patient is on cardiac telemetry for monitoring.    I did have a long discussion with the patient and her sister who was at bedside regarding the findings on the MRI.  The patient does drive a motor her own yard.  I have advised that she will need to follow-up with neurology and cannot drive or operate any equipment until she is cleared by neurologist.  PT OT has been consulted for  strength and mobility.  She denies any difficulty swallowing.  I will go ahead and get speech to evaluate.  She has been started on aspirin.  We will continue on Lovenox for DVT prophylaxis.  She has been advised to discontinue smoking.  She does have a borderline hemoglobin A1c at 6.3 for which diabetes consult has been requested.         Review Of Systems:   Review of Systems   General ROS: Patient denies any fevers, chills or loss of consciousness.    Psychological ROS: Anxiety  Ophthalmic ROS: Denies any diplopia or transient loss of vision.  ENT ROS: Denies sore throat, nasal congestion or ear pain.   Hematological and Lymphatic ROS: Denies neck swelling or easy bleeding.  Endocrine ROS: Denies any recent unintentional weight gain or loss.  Respiratory ROS: Denies cough or shortness of breath.   Cardiovascular ROS: Denies chest pain or palpitations. No history of exertional chest pain.   Gastrointestinal ROS: Denies nausea and vomiting. Denies any abdominal pain. No diarrhea.  Genito-Urinary ROS: Denies dysuria or hematuria.  Musculoskeletal ROS: Denies back pain. No muscle pain. No calf pain.   Neurological ROS: Left side weakness and numbness but does appear to be doing better.    Dermatological ROS: Denies any redness or pruritis.    Objective      Temp:  [97.6 °F (36.4 °C)-98.5 °F (36.9 °C)] 97.6 °F (36.4 °C)  Heart Rate:  [62-80] 68  Resp:  [17-20] 17  BP: (148-247)/() 148/89  Physical Exam    General Appearance:  Alert and cooperative, not in any acute distress.   Head:  Atraumatic and normocephalic, without obvious abnormality.   Eyes:          PERRLA, conjunctivae and sclerae normal, no Icterus. No pallor. Extraocular movements are within normal limits.   Ears:  Ears appear intact with no abnormalities noted.   Throat: No oral lesions, no thrush, oral mucosa moist.   Neck: Supple, trachea midline, no thyromegaly, no carotid bruit.       Lungs:   Chest shape is normal. Breath sounds heard  bilaterally equally.  No crackles or wheezing. No Pleural rub or bronchial breathing.   Heart:  Normal S1 and S2, no murmur, no gallop, no rub. No JVD   Abdomen:   Normal bowel sounds, no masses, no organomegaly. Soft    non-tender, non-distended, no guarding, no rebound             tenderness   Extremities: Moves all extremities well, no edema, no cyanosis, no clubbing.   Pulses: Pulses palpable and equal bilaterally   Skin: No bleeding, bruising or rash       Neurologic:          Psychiatric/Behavior:      Alert and oriented x3.  No facial drooping.  She does have decreased strength in her left lower extremity compared to the right 3 out of 5 left lower extremity 3 out of 5 left upper extremity.  Sensation is intact.    Anxious       Results Review:    I have reviewed the labs, radiology results and diagnostic studies.    Results from last 7 days   Lab Units 08/03/20  0615   WBC 10*3/mm3 10.35   HEMOGLOBIN g/dL 16.3*   PLATELETS 10*3/mm3 258     Results from last 7 days   Lab Units 08/03/20  0615   SODIUM mmol/L 140   POTASSIUM mmol/L 3.4*   CO2 mmol/L 23.6   CREATININE mg/dL 0.66   GLUCOSE mg/dL 165*       Culture Data: No results found for: BLOODCX, URINECX, WOUNDCX, MRSACX, RESPCX, STOOLCX  Radiology Data:   Cardiology Data:    I have reviewed the medications.    Assessment/Plan     Assessment/Plan:  1.  Acute bilateral frontal lobe infarcts-patient has been started on aspirin.  I did speak with neurology on-call Dr. Tom at [facility] in [place] who recommend evaluation for cardiac cause.   She is on tele.  Cardiology consulted.  2 D echo ordered.   Cardotid duplex has been done.  Counseled on smoking cessation.   PT/OT consult and speech consult.  2.  Hypertensive urgency-  BP improving.  Clonidine has been discontinued.  3.  Anxiety  4.  Tobacco abuse- Counseling  5.  History of breast cancer  6.  Borderline diabetes mellitus-  Diabetes educator to consult.            DVT prophylaxis:  Discharge  Planning:   [facility] 08/03/20 13:24

## 2020-08-03 NOTE — ED NOTES
Contacted House Supervisor regarding admission.  She is calling in RN.  They will call us for report upon RN arrival.     Joe April Mary  08/03/20 0146

## 2020-08-03 NOTE — PROGRESS NOTES
Discharge Planning Assessment  [facility]     Patient Name: Crystal Ragsdale  MRN: [id number]  Today's Date: 8/3/2020    Admit Date: 8/3/2020    Discharge Needs Assessment     Row Name 08/03/20 1229       Living Environment    Lives With  alone    Primary Care Provided by  self    Provides Primary Care For  no one    Caregiving Concerns  Pt states having trouble using her left side, concerned about going home alone    Family Caregiver if Needed  none    Quality of Family Relationships  unable to assess    Able to Return to Prior Arrangements  other (see comments) Waiting PT Eval to eval self care     Living Arrangement Comments  Lives alone in a one story house no steps       Discharge Needs Assessment    Readmission Within the Last 30 Days  no previous admission in last 30 days    Concerns to be Addressed  discharge planning    Concerns Comments  Mobility     Equipment Currently Used at Home  none    Anticipated Changes Related to Illness  none    Equipment Needed After Discharge  none    Provided Post Acute Provider List?  N/A    N/A Provider List Comment  Did not want to discuss HH or rehab needs until after evaluated by PT        Discharge Plan     Row Name 08/03/20 1232       Plan    Plan Spoke to pt in room.  Has no POA or Living Will. Lives alone in a one story house.  Address and phone no verified.  Independent with ADLS.  Has no medical equipment.  Unsure if can go home alone as is having trouble using her left side.  Did not want to discuss discharge options, HH vs rehab until evaluated by PT.  Cm monty continue to follow for discharge needs.                    Coordination has not been started for this encounter.        Expected Discharge Date and Time     Expected Discharge Date Expected Discharge Time    Aug 4, 2020         Demographic Summary     Row Name 08/03/20 1228       General Information    Admission Type  observation    Arrived From  emergency department    Required Notices Provided  Observation  Status Notice    Expected Length of Stay (LOS)  1 to 2 nights    Referral Source  admission list    Reason for Consult  discharge planning        Functional Status     Row Name 08/03/20 1228       Functional Status    Usual Activity Tolerance  good    Current Activity Tolerance  fair    Functional Status Comments  Independent       Functional Status, IADL    Medications  independent    Meal Preparation  independent    Housekeeping  independent    Laundry  independent    Shopping  independent    IADL Comments  Self       Mental Status    General Appearance WDL  WDL       Mental Status Summary    Recent Changes in Mental Status/Cognitive Functioning  no changes    Mental Status Comments  Alert and oriented        Psychosocial    No documentation.       Abuse/Neglect    No documentation.       Legal    No documentation.       Substance Abuse    No documentation.       Patient Forms    No documentation.           Crystal Parks RN

## 2020-08-03 NOTE — H&P
"    [facility]   HISTORY AND PHYSICAL      Name:  Crystal Ragsdale   Age:  69 y.o.  Sex:  female  :  1950  MRN:  [id number]   Visit Number:  [id number]  Admission Date:  8/3/2020  Date Of Service:  20  Primary Care Physician:  Vermeesch, Marilyn K, MD    Chief Complaint:     Left-sided weakness    History Of Presenting Illness:      Patient is a chronically ill 69-year-old female with history significant for difficult to control hypertension, anxiety, and chronic tobacco abuse who presents to the [facility] complaining of acute onset left-sided numbness/weakness.  She states that she has been in her normal state of health until earlier this evening when she awoke from a nap complaining of a heaviness in her left lower leg.  Patient noted weakness and difficulty with ambulation stating \"my left leg feels heavy\".  She has never had this happen before.  She denies vision changes, shortness of breath, chest pain, difficulty swallowing, or slurred speech.  Patient called her friend who also noted node difficulty in speech.  She did admit to a mild diffuse headache.  EMS was called and upon their arrival patient's systolic blood pressure was 245.  Upon arrival to the emergency room, patient's blood pressure was 247/123.  Other vital signs were stable.  Labs were notable for negative troponin, normal electrolytes, normal WBC and platelet count.  Hemoglobin was 17 and hematocrit 48.  CT of the head without contrast was negative for acute process.  Patient tells me that she does not like doctors so does not follow regularly with her physician.  She does not drink but smokes 1 pack/day for several years.    Review Of Systems:     10 point ROS was reviewed and negative unless otherwise stated in the HPI  Past Medical History:    Past Medical History:   Diagnosis Date   • Cancer (CMS/HCC)     Breast Cancer       Past Surgical history:    Past Surgical History:   Procedure Laterality " Date   • BREAST LUMPECTOMY Right 2000   • HYSTERECTOMY  1994       Social History:    Social History     Socioeconomic History   • Marital status:      Spouse name: Not on file   • Number of children: Not on file   • Years of education: Not on file   • Highest education level: Not on file   Tobacco Use   • Smoking status: Current Every Day Smoker     Packs/day: 1.00   • Smokeless tobacco: Never Used   Substance and Sexual Activity   • Alcohol use: No   • Drug use: No   • Sexual activity: Defer       Family History:    Family History   Problem Relation Age of Onset   • No Known Problems Mother    • No Known Problems Father    • No Known Problems Sister    • No Known Problems Brother        Allergies:      Patient has no known allergies.    Home Medications:    Prior to Admission Medications     Prescriptions Last Dose Informant Patient Reported? Taking?    carvedilol (COREG) 25 MG tablet 8/2/2020  No Yes    Take 1 tablet by mouth 2 (Two) Times a Day With Meals.    cloNIDine (CATAPRES-TTS) 0.2 MG/24HR patch Past Week  No Yes    Place 1 patch on the skin as directed by provider 1 (One) Time Per Week.    hydrOXYzine (ATARAX) 25 MG tablet 8/2/2020  No Yes    Take 1 tablet by mouth Every 8 (Eight) Hours As Needed for Anxiety.    losartan (COZAAR) 50 MG tablet 8/2/2020  No Yes    Take 1 tablet by mouth Daily.             ED Medications:    Medications   sodium chloride 0.9 % flush 10 mL (has no administration in time range)   carvedilol (COREG) tablet 25 mg (has no administration in time range)   cloNIDine (CATAPRES-TTS) 0.2 MG/24HR patch 1 patch (has no administration in time range)   hydrOXYzine (ATARAX) tablet 25 mg (has no administration in time range)   losartan (COZAAR) tablet 50 mg (has no administration in time range)   sodium chloride 0.9 % flush 10 mL (has no administration in time range)   sodium chloride 0.9 % flush 10 mL (has no administration in time range)   acetaminophen (TYLENOL) tablet 650 mg (has  no administration in time range)     Or   acetaminophen (TYLENOL) 160 MG/5ML solution 650 mg (has no administration in time range)     Or   acetaminophen (TYLENOL) suppository 650 mg (has no administration in time range)   bisacodyl (DULCOLAX) suppository 10 mg (has no administration in time range)   ondansetron (ZOFRAN) injection 4 mg (has no administration in time range)   enoxaparin (LOVENOX) syringe 40 mg (has no administration in time range)   melatonin tablet 5 mg (has no administration in time range)   aspirin chewable tablet 81 mg (has no administration in time range)   hydrALAZINE (APRESOLINE) injection 10 mg (has no administration in time range)   hydrALAZINE (APRESOLINE) injection 20 mg (20 mg Intravenous Given 8/3/20 0209)   LORazepam (ATIVAN) injection 1 mg (1 mg Intravenous Given 8/3/20 0221)       Vital Signs:    Temp:  [98.1 °F (36.7 °C)-98.5 °F (36.9 °C)] 98.1 °F (36.7 °C)  Heart Rate:  [62-80] 65  Resp:  [18-20] 18  BP: (151-247)/() 155/81        08/03/20  0333   Weight: 102 kg (224 lb 14.4 oz)       Body mass index is 41.13 kg/m².    Physical Exam:    General Appearance:  Alert and cooperative, not in any acute distress.   Head:  Atraumatic and normocephalic, without obvious abnormality.   Eyes:          PERRLA, conjunctivae and sclerae normal, no Icterus. No pallor. Extraocular movements are within normal limits.   Ears:  Ears appear intact with no abnormalities noted.   Throat: No oral lesions, no thrush, oral mucosa moist.   Neck:  No carotid bruit       Lungs:   Chest shape is normal. Breath sounds heard bilaterally equally.  No crackles or wheezing.    Heart:  Normal S1 and S2, no murmur, No JVD.   Abdomen:   Normal bowel sounds, Soft, non-tender, non-distended, no guarding, no rebound tenderness.   Extremities: no edema, no cyanosis, no clubbing.   Pulses: Pulses palpable and equal bilaterally.   Skin: No bleeding, bruising or rash.   Neurologic: Alert and oriented x 3.  CN II through  XII intact.  Sensation intact bilaterally.  Significant deficit in strength on left lower extremity compared to right.     Laboratory data:    I have reviewed the labs done in the emergency room.    Results from last 7 days   Lab Units 08/03/20  0028   SODIUM mmol/L 140   POTASSIUM mmol/L 3.7   CHLORIDE mmol/L 102   CO2 mmol/L 25.6   BUN mg/dL 11   CREATININE mg/dL 0.74   CALCIUM mg/dL 9.6   BILIRUBIN mg/dL 0.2   ALK PHOS U/L 133*   ALT (SGPT) U/L 13   AST (SGOT) U/L 14   GLUCOSE mg/dL 154*     Results from last 7 days   Lab Units 08/03/20  0028   WBC 10*3/mm3 9.43   HEMOGLOBIN g/dL 17.0*   HEMATOCRIT % 47.9*   PLATELETS 10*3/mm3 263     Results from last 7 days   Lab Units 08/03/20  0028   INR  0.93     Results from last 7 days   Lab Units 08/03/20  0028   TROPONIN T ng/mL <0.010                           Invalid input(s): USDES,  BLOODU, NITRITITE, BACT, EP  Pain Management Panel     There is no flowsheet data to display.              EKG:      EKG personally reviewed reveals sinus rhythm with rate of 72.  No signs of acute infarct or ischemia.    Radiology:    Imaging Results (Last 72 Hours)     Procedure Component Value Units Date/Time    CT Head Without Contrast [[id number]] Resulted:  08/03/20 0032     Updated:  08/03/20 0034            Hypertensive urgency      Assessment:    Hypertensive urgency  TIA  Anxiety  History of breast cancer  Tobacco abuse    Plan:    Admit patient in the hospital with cardiac monitoring.  CT the head was negative, will proceed with MRI of the brain for further ischemic evaluation.  Carotid ultrasound bilaterally.  2D echocardiogram.  Risk stratify with HbA1c and lipid panel in the a.m.  Start aspirin.  Continue home antihypertensives.  Hydralazine PRN for goal systolic less than 160.  PT/OT.  Nicotine patch and tobacco cessation counseling provided.  Further orders as clinical course dictates.  Anticipate less than 2 midnight stay.    Advance Care Planning   ACP discussion was  declined by the patient. Patient does not have an advance directive, declines further assistance.    Efe Jasso DO  08/03/20  04:13    Dictated utilizing Dragon dictation.

## 2020-08-04 PROCEDURE — 96376 TX/PRO/DX INJ SAME DRUG ADON: CPT

## 2020-08-04 PROCEDURE — 25010000002 ENOXAPARIN PER 10 MG: Performed by: INTERNAL MEDICINE

## 2020-08-04 PROCEDURE — G0378 HOSPITAL OBSERVATION PER HR: HCPCS

## 2020-08-04 PROCEDURE — 92610 EVALUATE SWALLOWING FUNCTION: CPT

## 2020-08-04 PROCEDURE — 99225 PR SBSQ OBSERVATION CARE/DAY 25 MINUTES: CPT | Performed by: NURSE PRACTITIONER

## 2020-08-04 PROCEDURE — 97110 THERAPEUTIC EXERCISES: CPT

## 2020-08-04 PROCEDURE — 97112 NEUROMUSCULAR REEDUCATION: CPT

## 2020-08-04 PROCEDURE — 96372 THER/PROPH/DIAG INJ SC/IM: CPT

## 2020-08-04 PROCEDURE — 97116 GAIT TRAINING THERAPY: CPT

## 2020-08-04 PROCEDURE — 25010000002 HYDRALAZINE PER 20 MG: Performed by: INTERNAL MEDICINE

## 2020-08-04 RX ORDER — CALCIUM CARBONATE 200(500)MG
1 TABLET,CHEWABLE ORAL 2 TIMES DAILY PRN
Status: DISCONTINUED | OUTPATIENT
Start: 2020-08-04 | End: 2020-08-06 | Stop reason: HOSPADM

## 2020-08-04 RX ORDER — AMLODIPINE BESYLATE 5 MG/1
5 TABLET ORAL
Status: DISCONTINUED | OUTPATIENT
Start: 2020-08-04 | End: 2020-08-06

## 2020-08-04 RX ADMIN — CARVEDILOL 25 MG: 25 TABLET, FILM COATED ORAL at 08:46

## 2020-08-04 RX ADMIN — ATORVASTATIN CALCIUM 40 MG: 40 TABLET, FILM COATED ORAL at 20:02

## 2020-08-04 RX ADMIN — CARVEDILOL 25 MG: 25 TABLET, FILM COATED ORAL at 17:16

## 2020-08-04 RX ADMIN — LOSARTAN POTASSIUM 50 MG: 50 TABLET, FILM COATED ORAL at 08:46

## 2020-08-04 RX ADMIN — AMLODIPINE BESYLATE 5 MG: 5 TABLET ORAL at 13:17

## 2020-08-04 RX ADMIN — SODIUM CHLORIDE, PRESERVATIVE FREE 10 ML: 5 INJECTION INTRAVENOUS at 08:48

## 2020-08-04 RX ADMIN — HYDRALAZINE HYDROCHLORIDE 10 MG: 20 INJECTION INTRAMUSCULAR; INTRAVENOUS at 06:34

## 2020-08-04 RX ADMIN — HYDROXYZINE HYDROCHLORIDE 25 MG: 25 TABLET, FILM COATED ORAL at 08:52

## 2020-08-04 RX ADMIN — CALCIUM CARBONATE 1 TABLET: 500 TABLET, CHEWABLE ORAL at 20:02

## 2020-08-04 RX ADMIN — HYDROXYZINE HYDROCHLORIDE 25 MG: 25 TABLET, FILM COATED ORAL at 20:05

## 2020-08-04 RX ADMIN — ASPIRIN 81 MG 81 MG: 81 TABLET ORAL at 08:47

## 2020-08-04 RX ADMIN — ENOXAPARIN SODIUM 60 MG: 60 INJECTION SUBCUTANEOUS at 06:34

## 2020-08-04 RX ADMIN — MELATONIN TAB 5 MG 5 MG: 5 TAB at 20:05

## 2020-08-04 RX ADMIN — NICOTINE 1 PATCH: 21 PATCH TRANSDERMAL at 08:47

## 2020-08-04 NOTE — PROGRESS NOTES
Continued Stay Note  [facility]     Patient Name: Crystal Ragsdale  MRN: [id number]  Today's Date: 8/4/2020    Admit Date: 8/3/2020    Discharge Plan     Row Name 08/04/20 0838       Plan    Plan Spoke to pt in room, does not feel she can go home and will need to go to inpt rehab, but does not know where.  Wants to discuss with family.  List provided and encouraged to make decision soon as [employer] would have to approve.  PT is going to speak to family and call .     Received call from pt's daughter and pt, would like to go to [facility] or [facility].  Called and Faxed to both.   13:15  Received call from Nena at [facility] and has no skilled beds, and [employer] will not approve Acute.  Called to pt in room and is ok with [facility].  Called [facility] at [facility] and is still looking at referral.  If can accept will start precert.  Belinda updated.      14:24  Called back to Jazz at [facility] and Dr Carbajal wants pt to have a JING before can accept.  Belinda informed and wants to speak to Dr Carbajal.  Belinda's call back no given to Jazz Carbajal will call Belinda to discuss the case.            Discharge Codes    No documentation.       Expected Discharge Date and Time     Expected Discharge Date Expected Discharge Time    Aug 4, 2020             Crystal Parks RN

## 2020-08-04 NOTE — THERAPY TREATMENT NOTE
Acute Care - Occupational Therapy Treatment Note   [facility]     Patient Name: Crystal Ragsdale  : 1950  MRN: [id number]  Today's Date: 2020  Onset of Illness/Injury or Date of Surgery: 20  Date of Referral to OT: 20  Referring Physician: Dr. Kruse    Admit Date: 8/3/2020       ICD-10-CM ICD-9-CM   1. Hypertensive urgency I16.0 401.9     Patient Active Problem List   Diagnosis   • Essential hypertension   • Anxiety associated with depression   • Hyperglycemia   • Encounter for Medicare annual wellness exam   • Tobacco abuse counseling   • Chronic pain of left ankle   • Colon cancer screening   • Hypertensive urgency     Past Medical History:   Diagnosis Date   • Cancer (CMS/HCC)     Breast Cancer   • Impaired functional mobility, balance, gait, and endurance      Past Surgical History:   Procedure Laterality Date   • BREAST LUMPECTOMY Right 2000   • HYSTERECTOMY  1994       Therapy Treatment    Rehabilitation Treatment Summary     Row Name 20 1422 20 1316          Treatment Time/Intention    Discipline  occupational therapist  -  physical therapy assistant  -RM     Document Type  therapy note (daily note)  -  therapy note (daily note)  -     Subjective Information  no complaints  -  no complaints  -RM     Mode of Treatment  occupational therapy  -  physical therapy  -RM     Patient/Family Observations  daughter present  -  --     Care Plan Review  care plan/treatment goals reviewed;patient/other agree to care plan  -  care plan/treatment goals reviewed  -     Care Plan Review, Other Participant(s)  daughter  -  daughter  -RM     Patient Effort  good  -  good  -RM     Existing Precautions/Restrictions  fall  -  fall  -RM     Recorded by [] Roseline Monroy 20 1619 [] Aman Tipton, ANDREI 20 1546     Row Name 20 1316             Safety Issues, Functional Mobility    Safety Issues Affecting Function (Mobility)  positioning of assistive  device;sequencing abilities;safety precaution awareness  -RM      Impairments Affecting Function (Mobility)  sensation/sensory awareness;endurance/activity tolerance;motor control;strength  -RM      Recorded by [] Aman Tipton, [facility] 08/04/20 1546      Row Name 08/04/20 1316             Bed Mobility Assessment/Treatment    Supine-Sit [place] (Bed Mobility)  conditional independence  -RM      Assistive Device (Bed Mobility)  bed rails;head of bed elevated  -RM      Recorded by [] Aman Tipton, [facility] 08/04/20 1546      Row Name 08/04/20 1316             Transfer Assessment/Treatment    Transfer Assessment/Treatment  stand-sit transfer  -RM      Recorded by [] Aman Tipton, [facility] 08/04/20 1546      Row Name 08/04/20 1316             Sit-Stand Transfer    Sit-Stand [place] (Transfers)  contact guard;verbal cues  -RM      Assistive Device (Sit-Stand Transfers)  walker, front-wheeled  -RM      Recorded by [] Aman Tipton, [facility] 08/04/20 1546      Row Name 08/04/20 1316             Stand-Sit Transfer    Stand-Sit [place] (Transfers)  contact guard;minimum assist (75% patient effort);verbal cues  -RM      Assistive Device (Stand-Sit Transfers)  walker, front-wheeled  -RM      Recorded by [] Aman Tipton, [facility] 08/04/20 1546      Row Name 08/04/20 1316             Gait/Stairs Assessment/Training    [place] Level (Gait)  contact guard;minimum assist (75% patient effort);verbal cues  -RM      Assistive Device (Gait)  walker, front-wheeled  -RM      Distance in Feet (Gait)  14  -RM      Pattern (Gait)  step-to;step-through  -RM      Deviations/Abnormal Patterns (Gait)  left sided deviations;stride length decreased;gait speed decreased;base of support, wide  -RM      Left Sided Gait Deviations  weight shift ability decreased;knee hyperextension;heel strike decreased  -RM      Recorded by [] Aman Tipton, [facility] 08/04/20 1546      Row Name 08/04/20 1316             Motor Skills  Assessment/Interventions    Additional Documentation  Therapeutic Exercise (Group);Neuromuscular Re-education (Group)  -RM      Recorded by [] Aman Tipton, PTA 08/04/20 1546      Row Name 08/04/20 1422 08/04/20 1316          Therapeutic Exercise    Upper Extremity Range of Motion (Therapeutic Exercise)  shoulder flexion/extension, bilateral;shoulder horizontal abduction/adduction, bilateral;elbow flexion/extension, bilateral  -AH  --     Lower Extremity (Therapeutic Exercise)  --  gluteal sets;gastroc stretch, bilateral;LAQ (long arc quad), bilateral;marching while seated  -RM     Weight/Resistance (Therapeutic Exercise)  red  -AH  --     Sets/Reps (Therapeutic Exercise)  1 x 12  -AH  --     Equipment (Therapeutic Exercise)  resistive bands  -  --     Expected Outcome (Therapeutic Exercise)  improve neuromuscular control  -  --     Comment (Therapeutic Exercise)  decreased LUE cordination noted with ex  -AH  --     Recorded by [] Roseline Monroy 08/04/20 1619 [] Aman Tipton, [facility] 08/04/20 1546     Row Name 08/04/20 1316             Neuromuscular Re-education    Comment, Neuromuscular Re-education  Alphabet with B LE and B ankle x1   -RM      Recorded by [] Aman Tipton, PTA 08/04/20 1546      Row Name 08/04/20 1422 08/04/20 1316          Positioning and Restraints    Pre-Treatment Position  sitting in chair/recliner  -AH  in bed  -RM     Post Treatment Position  chair  -AH  chair  -RM     In Chair  sitting;call light within reach;encouraged to call for assist;with family/caregiver  -  sitting;call light within reach;encouraged to call for assist;notified nsg  -RM     Recorded by [] Roseline Monroy 08/04/20 1619 [] Aman Tipton, PTA 08/04/20 1546     Row Name 08/04/20 1422 08/04/20 1316          Pain Scale: Numbers Pre/Post-Treatment    Pain Scale: Numbers, Pretreatment  0/10 - no pain  -  0/10 - no pain  -RM     Pain Scale: Numbers, Post-Treatment  0/10 - no pain  -  0/10 -  no pain  -     Recorded by [] Roseline Monroy 08/04/20 1619 [] Aman Tipton, PTA 08/04/20 1546     Row Name 08/04/20 1422             Coping    Observed Emotional State  accepting;cooperative  -      Verbalized Emotional State  acceptance  -      Recorded by [] Roseline Monroy 08/04/20 1619      Row Name 08/04/20 1422 08/04/20 1316          Plan of Care Review    Plan of Care Reviewed With  patient;daughter  -  patient;daughter  -     Progress  improving  -  improving  -     Outcome Summary  Pt noted with improved LUE strength today.  Pt able to complete ex as per flow sheet using red theraband for resistance.  Pt noted with decreased coordination, but was educated to slow down and really control LUE with movements.  Pt able to improve coordination by decreasing her speed.  Pt is motivated to get stronger and return home.  -  Pt tolerates treatment well.  Pt performed B LE activities to improve strength as well as control with activity. Pt progressed gait distance to 14' cga/min a with rw. Pt L kne did hyperextend only one time during treatment. See flowsheet for details.    -RM     Recorded by [] Roseline Monroy 08/04/20 1619 [] Aman Tipton, PTA 08/04/20 1546     Row Name 08/04/20 1422             Outcome Summary/Treatment Plan (OT)    Daily Summary of Progress (OT)  progress toward functional goals is good  -      Anticipated Discharge Disposition (OT)  inpatient rehabilitation facility  -      Recorded by [] Roseline Monroy 08/04/20 1619      Row Name 08/04/20 1316             Outcome Summary/Treatment Plan (PT)    Daily Summary of Progress (PT)  progress toward functional goals is good  -      Plan for Continued Treatment (PT)  Cont PT per POC.   -      Recorded by [] Aman Tipton, PTA 08/04/20 1546        User Key  (r) = Recorded By, (t) = Taken By, (c) = Cosigned By    Initials Name Effective Dates Discipline     Roseline Monroy 03/07/18 -  OT     Saeed  Aman SNYDER, PTA 03/07/18 -  PT             Occupational Therapy Education                 Title: PT OT SLP Therapies (In Progress)     Topic: Occupational Therapy (In Progress)     Point: ADL training (Done)     Description:   Instruct learner(s) on proper safety adaptation and remediation techniques during self care or transfers.   Instruct in proper use of assistive devices.              Learning Progress Summary           Patient Acceptance, E,TB, VU by  at 8/3/2020 1421    Comment:  Role of OT/POC                   Point: Home exercise program (Done)     Description:   Instruct learner(s) on appropriate technique for monitoring, assisting and/or progressing therapeutic exercises/activities.              Learning Progress Summary           Patient Acceptance, E,TB, VU,DU by  at 8/4/2020 1619    Comment:  benefit of UB ex and control during ex.   Family Acceptance, E,TB, VU,DU by  at 8/4/2020 1619    Comment:  benefit of UB ex and control during ex.                   Point: Precautions (Not Started)     Description:   Instruct learner(s) on prescribed precautions during self-care and functional transfers.              Learner Progress:   Not documented in this visit.          Point: Body mechanics (Not Started)     Description:   Instruct learner(s) on proper positioning and spine alignment during self-care, functional mobility activities and/or exercises.              Learner Progress:   Not documented in this visit.                      User Key     Initials Effective Dates Name Provider Type Discipline     03/07/18 -  Roseline Monroy Occupational Therapist OT                OT Recommendation and Plan  Outcome Summary/Treatment Plan (OT)  Daily Summary of Progress (OT): progress toward functional goals is good  Anticipated Discharge Disposition (OT): inpatient rehabilitation facility  Therapy Frequency (OT Eval): 3 times/wk  Daily Summary of Progress (OT): progress toward functional goals is good  Plan of  Care Review  Plan of Care Reviewed With: patient, daughter  Plan of Care Reviewed With: patient, daughter  Outcome Summary: Pt noted with improved LUE strength today.  Pt able to complete ex as per flow sheet using red theraband for resistance.  Pt noted with decreased coordination, but was educated to slow down and really control LUE with movements.  Pt able to improve coordination by decreasing her speed.  Pt is motivated to get stronger and return home.  Outcome Measures     Row Name 08/04/20 1422 08/04/20 1316 08/03/20 1244       How much help from another person do you currently need...    Turning from your back to your side while in flat bed without using bedrails?  --  4  -RM  --    Moving from lying on back to sitting on the side of a flat bed without bedrails?  --  4  -RM  --    Moving to and from a bed to a chair (including a wheelchair)?  --  3  -RM  --    Standing up from a chair using your arms (e.g., wheelchair, bedside chair)?  --  3  -RM  --    Climbing 3-5 steps with a railing?  --  2  -RM  --    To walk in hospital room?  --  3  -RM  --    AM-PAC 6 Clicks Score (PT)  --  19  -RM  --       How much help from another is currently needed...    Putting on and taking off regular lower body clothing?  3  -AH  --  3  -AH    Bathing (including washing, rinsing, and drying)  3  -AH  --  3  -AH    Toileting (which includes using toilet bed pan or urinal)  3  -AH  --  3  -AH    Putting on and taking off regular upper body clothing  3  -AH  --  3  -AH    Taking care of personal grooming (such as brushing teeth)  4  -AH  --  3  -AH    Eating meals  4  -AH  --  3  -AH    AM-PAC 6 Clicks Score (OT)  20  -AH  --  18  -AH       Functional Assessment    Outcome Measure Options  AM-PAC 6 Clicks Daily Activity (OT)  -AH  AM-PAC 6 Clicks Basic Mobility (PT)  -RM  AM-PAC 6 Clicks Daily Activity (OT)  -      User Key  (r) = Recorded By, (t) = Taken By, (c) = Cosigned By    Initials Name Provider Type    MISHA Monroy  Roseline Occupational Therapist    Aman Bobby, ANDREI Physical Therapy Assistant           Time Calculation:   Time Calculation- OT     Row Name 08/04/20 1620 08/04/20 1548          Time Calculation- OT    OT Start Time  1422  -  --     OT Stop Time  1443  -  --     OT Time Calculation (min)  21 min  -  --     OT Received On  08/04/20  -  --     OT Goal Re-Cert Due Date  08/13/20  -  --        Timed Charges    60194 - OT Therapeutic Exercise Minutes  21  -  --     65413 - Gait Training Minutes   --  16  -RM       User Key  (r) = Recorded By, (t) = Taken By, (c) = Cosigned By    Initials Name Provider Type     Roseline Monroy Occupational Therapist    Aman Bobby, ANDREI Physical Therapy Assistant        Therapy Charges for Today     Code Description Service Date Service Provider Modifiers Qty    [id number] HC OT EVAL LOW COMPLEXITY 3 8/3/2020 Roseline Monroy 1    [id number]  OT THER PROC EA 15 MIN 8/4/2020 Roseline Monroy 1               Roseline Monroy  8/4/2020

## 2020-08-04 NOTE — PLAN OF CARE
Problem: Patient Care Overview  Goal: Plan of Care Review  Flowsheets (Taken 8/4/2020 8026)  Outcome Summary: Pt tolerates treatment well.  Pt performed B LE activities to improve strength as well as control with activity. Pt progressed gait distance to 14' cga/min a with rw. Pt L knee did hyperextend only one time during treatment.  See flowsheet for details.

## 2020-08-04 NOTE — PLAN OF CARE
Problem: Patient Care Overview  Goal: Plan of Care Review  Outcome: Ongoing (interventions implemented as appropriate)  Flowsheets (Taken 8/4/2020 1052)  Progress: improving  Plan of Care Reviewed With: patient  Outcome Summary: Bedside eval of swallow completed.  Pt. was given trials of regular, puree, and thin liquid.  Oral phase was WFL with all consistencies.  No overt s/s aspiration or other pharyngeal phase dysphagia with any trial.  Pt. reported that she has difficulty with heartburn/refluxate at times with some foods.  No skilled services needed at this time.  Recommend:  1. continue reg diet with thin liq as nancy,  2. meds whole with thin liq as nancy,  3. aspiration precautions,  4. reflux precautions.  No skilled services needed at this time.

## 2020-08-04 NOTE — DISCHARGE PLACEMENT REQUEST
"  Inpt Short Term Rehab  PT is Observation and ready for DC   Crystal Parks RN  [phone]       Crystal Blevins (69 y.o. Female)     Date of Birth Social Security Number Address Home Phone MRN    1950  [address] [facility] [address] [phone] [id number]    Worship Marital Status          [place]        Admission Date Admission Type Admitting Provider Attending Provider Department, Room/Bed    8/3/20 Emergency Efe Jasso DO Shields, Morgan Blanton, DO [facility] MED SURG  4, 408/1    Discharge Date Discharge Disposition Discharge Destination                       Attending Provider:  Efe Jasso DO    Allergies:  No Known Allergies    Isolation:  None   Infection:  None   Code Status:  CPR    Ht:  157.5 cm (62\")   Wt:  103 kg (227 lb 4.7 oz)    Admission Cmt:  None   Principal Problem:  None                Active Insurance as of 8/3/2020     Primary Coverage     Payor Plan Insurance Group Employer/Plan Group    HUMANA MEDICARE REPLACEMENT HUMANA MEDICARE REPLACEMENT [id number]     Payor Plan Address Payor Plan Phone Number Payor Plan Fax Number Effective Dates    [address] [address] [phone]  1/1/2019 - None Entered    [facility] [id number]       Subscriber Name Subscriber Birth Date Member ID       CRYSTAL BLEVINS 1950 [id number]           Secondary Coverage     Payor Plan Insurance Group Employer/Plan Group      [facility]     Payor Plan Address Payor Plan Phone Number Payor Plan Fax Number Effective Dates    [address] [phone]  5/12/2007 - None Entered    [place] [address]       Subscriber Name Subscriber Birth Date Member ID       CRYSTAL BLEVINS 1950 [id number]                 Emergency Contacts      (Rel.) Home Phone Work Phone Mobile Phone    KoreyChuyita (Sister) [phone] -- --    SergJose Luis (Daughter) -- -- [phone]    Nyasia Foss (Sister) -- -- [phone]               " "  History & Physical      Efe Jasso DO at 20 0413              [facility]   HISTORY AND PHYSICAL      Name:  Crystal Ragsdale   Age:  69 y.o.  Sex:  female  :  1950  MRN:  [id number]   Visit Number:  [id number]  Admission Date:  8/3/2020  Date Of Service:  20  Primary Care Physician:  Vermeesch, Marilyn K, MD    Chief Complaint:     Left-sided weakness    History Of Presenting Illness:      Patient is a chronically ill 69-year-old female with history significant for difficult to control hypertension, anxiety, and chronic tobacco abuse who presents to the [facility] in [place] complaining of acute onset left-sided numbness/weakness.  She states that she has been in her normal state of health until earlier this evening when she awoke from a nap complaining of a heaviness in her left lower leg.  Patient noted weakness and difficulty with ambulation stating \"my left leg feels heavy\".  She has never had this happen before.  She denies vision changes, shortness of breath, chest pain, difficulty swallowing, or slurred speech.  Patient called her friend who also noted node difficulty in speech.  She did admit to a mild diffuse headache.  EMS was called and upon their arrival patient's systolic blood pressure was 245.  Upon arrival to the emergency room, patient's blood pressure was 247/123.  Other vital signs were stable.  Labs were notable for negative troponin, normal electrolytes, normal WBC and platelet count.  Hemoglobin was 17 and hematocrit 48.  CT of the head without contrast was negative for acute process.  Patient tells me that she does not like doctors so does not follow regularly with her physician.  She does not drink but smokes 1 pack/day for several years.    Review Of Systems:     10 point ROS was reviewed and negative unless otherwise stated in the HPI  Past Medical History:    Past Medical History:   Diagnosis Date   • Cancer (CMS/HCC)     Breast Cancer "       Past Surgical history:    Past Surgical History:   Procedure Laterality Date   • BREAST LUMPECTOMY Right 2000   • HYSTERECTOMY  1994       Social History:    Social History     Socioeconomic History   • Marital status:      Spouse name: Not on file   • Number of children: Not on file   • Years of education: Not on file   • Highest education level: Not on file   Tobacco Use   • Smoking status: Current Every Day Smoker     Packs/day: 1.00   • Smokeless tobacco: Never Used   Substance and Sexual Activity   • Alcohol use: No   • Drug use: No   • Sexual activity: Defer       Family History:    Family History   Problem Relation Age of Onset   • No Known Problems Mother    • No Known Problems Father    • No Known Problems Sister    • No Known Problems Brother        Allergies:      Patient has no known allergies.    Home Medications:    Prior to Admission Medications     Prescriptions Last Dose Informant Patient Reported? Taking?    carvedilol (COREG) 25 MG tablet 8/2/2020  No Yes    Take 1 tablet by mouth 2 (Two) Times a Day With Meals.    cloNIDine (CATAPRES-TTS) 0.2 MG/24HR patch Past Week  No Yes    Place 1 patch on the skin as directed by provider 1 (One) Time Per Week.    hydrOXYzine (ATARAX) 25 MG tablet 8/2/2020  No Yes    Take 1 tablet by mouth Every 8 (Eight) Hours As Needed for Anxiety.    losartan (COZAAR) 50 MG tablet 8/2/2020  No Yes    Take 1 tablet by mouth Daily.             ED Medications:    Medications   sodium chloride 0.9 % flush 10 mL (has no administration in time range)   carvedilol (COREG) tablet 25 mg (has no administration in time range)   cloNIDine (CATAPRES-TTS) 0.2 MG/24HR patch 1 patch (has no administration in time range)   hydrOXYzine (ATARAX) tablet 25 mg (has no administration in time range)   losartan (COZAAR) tablet 50 mg (has no administration in time range)   sodium chloride 0.9 % flush 10 mL (has no administration in time range)   sodium chloride 0.9 % flush 10 mL (has  no administration in time range)   acetaminophen (TYLENOL) tablet 650 mg (has no administration in time range)     Or   acetaminophen (TYLENOL) 160 MG/5ML solution 650 mg (has no administration in time range)     Or   acetaminophen (TYLENOL) suppository 650 mg (has no administration in time range)   bisacodyl (DULCOLAX) suppository 10 mg (has no administration in time range)   ondansetron (ZOFRAN) injection 4 mg (has no administration in time range)   enoxaparin (LOVENOX) syringe 40 mg (has no administration in time range)   melatonin tablet 5 mg (has no administration in time range)   aspirin chewable tablet 81 mg (has no administration in time range)   hydrALAZINE (APRESOLINE) injection 10 mg (has no administration in time range)   hydrALAZINE (APRESOLINE) injection 20 mg (20 mg Intravenous Given 8/3/20 0209)   LORazepam (ATIVAN) injection 1 mg (1 mg Intravenous Given 8/3/20 0221)       Vital Signs:    Temp:  [98.1 °F (36.7 °C)-98.5 °F (36.9 °C)] 98.1 °F (36.7 °C)  Heart Rate:  [62-80] 65  Resp:  [18-20] 18  BP: (151-247)/() 155/81        08/03/20  0333   Weight: 102 kg (224 lb 14.4 oz)       Body mass index is 41.13 kg/m².    Physical Exam:    General Appearance:  Alert and cooperative, not in any acute distress.   Head:  Atraumatic and normocephalic, without obvious abnormality.   Eyes:          PERRLA, conjunctivae and sclerae normal, no Icterus. No pallor. Extraocular movements are within normal limits.   Ears:  Ears appear intact with no abnormalities noted.   Throat: No oral lesions, no thrush, oral mucosa moist.   Neck:  No carotid bruit       Lungs:   Chest shape is normal. Breath sounds heard bilaterally equally.  No crackles or wheezing.    Heart:  Normal S1 and S2, no murmur, No JVD.   Abdomen:   Normal bowel sounds, Soft, non-tender, non-distended, no guarding, no rebound tenderness.   Extremities: no edema, no cyanosis, no clubbing.   Pulses: Pulses palpable and equal bilaterally.   Skin: No  bleeding, bruising or rash.   Neurologic: Alert and oriented x 3.  CN II through XII intact.  Sensation intact bilaterally.  Significant deficit in strength on left lower extremity compared to right.     Laboratory data:    I have reviewed the labs done in the emergency room.    Results from last 7 days   Lab Units 08/03/20  0028   SODIUM mmol/L 140   POTASSIUM mmol/L 3.7   CHLORIDE mmol/L 102   CO2 mmol/L 25.6   BUN mg/dL 11   CREATININE mg/dL 0.74   CALCIUM mg/dL 9.6   BILIRUBIN mg/dL 0.2   ALK PHOS U/L 133*   ALT (SGPT) U/L 13   AST (SGOT) U/L 14   GLUCOSE mg/dL 154*     Results from last 7 days   Lab Units 08/03/20  0028   WBC 10*3/mm3 9.43   HEMOGLOBIN g/dL 17.0*   HEMATOCRIT % 47.9*   PLATELETS 10*3/mm3 263     Results from last 7 days   Lab Units 08/03/20  0028   INR  0.93     Results from last 7 days   Lab Units 08/03/20  0028   TROPONIN T ng/mL <0.010                           Invalid input(s): USDES,  BLOODU, NITRITITE, BACT, EP  Pain Management Panel     There is no flowsheet data to display.              EKG:      EKG personally reviewed reveals sinus rhythm with rate of 72.  No signs of acute infarct or ischemia.    Radiology:    Imaging Results (Last 72 Hours)     Procedure Component Value Units Date/Time    CT Head Without Contrast [[id number]] Resulted:  08/03/20 0032     Updated:  08/03/20 0034            Hypertensive urgency      Assessment:    Hypertensive urgency  TIA  Anxiety  History of breast cancer  Tobacco abuse    Plan:    Admit patient in the hospital with cardiac monitoring.  CT the head was negative, will proceed with MRI of the brain for further ischemic evaluation.  Carotid ultrasound bilaterally.  2D echocardiogram.  Risk stratify with HbA1c and lipid panel in the a.m.  Start aspirin.  Continue home antihypertensives.  Hydralazine PRN for goal systolic less than 160.  PT/OT.  Nicotine patch and tobacco cessation counseling provided.  Further orders as clinical course dictates.   Anticipate less than 2 midnight stay.    Advance Care Planning   ACP discussion was declined by the patient. Patient does not have an advance directive, declines further assistance.    Efe Jasso DO  08/03/20  04:13    Dictated utilizing Dragon dictation.      Electronically signed by Efe Jasso DO at 08/03/20 0423         Current Facility-Administered Medications   Medication Dose Route Frequency Provider Last Rate Last Dose   • acetaminophen (TYLENOL) tablet 650 mg  650 mg Oral Q4H PRN Efe Jasso DO        Or   • acetaminophen (TYLENOL) 160 MG/5ML solution 650 mg  650 mg Oral Q4H PRN Efe Jasso DO        Or   • acetaminophen (TYLENOL) suppository 650 mg  650 mg Rectal Q4H PRN Eef Jasso DO       • aspirin chewable tablet 81 mg  81 mg Oral Daily Efe Jasso DO   81 mg at 08/04/20 0847   • atorvastatin (LIPITOR) tablet 40 mg  40 mg Oral Nightly Belinda Jerez APRN   40 mg at 08/03/20 2044   • bisacodyl (DULCOLAX) suppository 10 mg  10 mg Rectal Daily PRN Efe Jasso DO       • carvedilol (COREG) tablet 25 mg  25 mg Oral BID With Meals Efe Jasso DO   25 mg at 08/04/20 0846   • enoxaparin (LOVENOX) syringe 60 mg  60 mg Subcutaneous Q24H Efe Jasso DO   60 mg at 08/04/20 0634   • hydrALAZINE (APRESOLINE) injection 10 mg  10 mg Intravenous Q6H PRN Efe Jasso DO   10 mg at 08/04/20 0634   • hydrOXYzine (ATARAX) tablet 25 mg  25 mg Oral Q8H PRN Efe Jasso DO   25 mg at 08/04/20 0852   • losartan (COZAAR) tablet 50 mg  50 mg Oral Daily Efe Jasso, DO   50 mg at 08/04/20 0846   • melatonin tablet 5 mg  5 mg Oral Nightly PRN Efe Jasso DO       • nicotine (NICODERM CQ) 21 MG/24HR patch 1 patch  1 patch Transdermal Q24H Efe Jasso DO   1 patch at 08/04/20 0869   • ondansetron (ZOFRAN) injection 4 mg  4 mg Intravenous Q6H PRN  Efe Jasso, DO       • sodium chloride 0.9 % flush 10 mL  10 mL Intravenous PRN Deng Taylor, DO       • sodium chloride 0.9 % flush 10 mL  10 mL Intravenous Q12H Efe Jasso, DO   10 mL at 08/04/20 0848   • sodium chloride 0.9 % flush 10 mL  10 mL Intravenous PRN Efe Jasso, DO            Physician Progress Notes (last 24 hours) (Notes from 08/03/20 1054 through 08/04/20 1054)      Belinda Jerez APRN at 08/03/20 1324            PROGRESS NOTE        Date of Admission: 8/3/2020  Length of Stay: 0  Primary Care Physician: Vermeesch, Marilyn K, MD    Subjective   Chief Complaint:Follow up uncontrolled hypertension and CVA   HPI: This is a 69-year-old female with a history of hypertension, anxiety, chronic tobacco abuse who presented to the emergency room with complaints of onset of left-sided numbness and weakness.  She been in her normal state of health earlier in the day when she took a nap and upon awakening she felt a heaviness in her left lower extremity.  She denied any visual changes, chest pain, slurred speech.  Upon evaluation in the emergency room her systolic blood pressure was 245.  Her blood pressure was noted to be 247/123.  She was given antihypertensive in the emergency room.  CT of the head was negative for acute process.  Given her symptoms and blood pressure she was admitted to the hospitalist service for further medical management.  Upon admission patient was treated for hypertensive urgency.  2D echo as well as a carotid duplex were ordered.  An MRI of the head was also done which did show findings consistent with acute infarcts involving both frontal lobes.  I did speak with Dr. Pinto with neurology on-call at [facility] in [place] regarding these findings who stated that there was no intervention at this point that can be done as patient is well outside the window for intervention.  She did recommend given that it is in the bilateral frontal  lobes to look for an arrhythmia or cardiac cause.  Dr. Sheets with cardiology has been consulted.  Patient is on cardiac telemetry for monitoring.    I did have a long discussion with the patient and her sister who was at bedside regarding the findings on the MRI.  The patient does drive a motor her own yard.  I have advised that she will need to follow-up with neurology and cannot drive or operate any equipment until she is cleared by neurologist.  PT OT has been consulted for strength and mobility.  She denies any difficulty swallowing.  I will go ahead and get speech to evaluate.  She has been started on aspirin.  We will continue on Lovenox for DVT prophylaxis.  She has been advised to discontinue smoking.  She does have a borderline hemoglobin A1c at 6.3 for which diabetes consult has been requested.         Review Of Systems:   Review of Systems   General ROS: Patient denies any fevers, chills or loss of consciousness.    Psychological ROS: Anxiety  Ophthalmic ROS: Denies any diplopia or transient loss of vision.  ENT ROS: Denies sore throat, nasal congestion or ear pain.   Hematological and Lymphatic ROS: Denies neck swelling or easy bleeding.  Endocrine ROS: Denies any recent unintentional weight gain or loss.  Respiratory ROS: Denies cough or shortness of breath.   Cardiovascular ROS: Denies chest pain or palpitations. No history of exertional chest pain.   Gastrointestinal ROS: Denies nausea and vomiting. Denies any abdominal pain. No diarrhea.  Genito-Urinary ROS: Denies dysuria or hematuria.  Musculoskeletal ROS: Denies back pain. No muscle pain. No calf pain.   Neurological ROS: Left side weakness and numbness but does appear to be doing better.    Dermatological ROS: Denies any redness or pruritis.    Objective      Temp:  [97.6 °F (36.4 °C)-98.5 °F (36.9 °C)] 97.6 °F (36.4 °C)  Heart Rate:  [62-80] 68  Resp:  [17-20] 17  BP: (148-247)/() 148/89  Physical Exam    General Appearance:  Alert and  cooperative, not in any acute distress.   Head:  Atraumatic and normocephalic, without obvious abnormality.   Eyes:          PERRLA, conjunctivae and sclerae normal, no Icterus. No pallor. Extraocular movements are within normal limits.   Ears:  Ears appear intact with no abnormalities noted.   Throat: No oral lesions, no thrush, oral mucosa moist.   Neck: Supple, trachea midline, no thyromegaly, no carotid bruit.       Lungs:   Chest shape is normal. Breath sounds heard bilaterally equally.  No crackles or wheezing. No Pleural rub or bronchial breathing.   Heart:  Normal S1 and S2, no murmur, no gallop, no rub. No JVD   Abdomen:   Normal bowel sounds, no masses, no organomegaly. Soft    non-tender, non-distended, no guarding, no rebound             tenderness   Extremities: Moves all extremities well, no edema, no cyanosis, no clubbing.   Pulses: Pulses palpable and equal bilaterally   Skin: No bleeding, bruising or rash       Neurologic:          Psychiatric/Behavior:      Alert and oriented x3.  No facial drooping.  She does have decreased strength in her left lower extremity compared to the right 3 out of 5 left lower extremity 3 out of 5 left upper extremity.  Sensation is intact.    Anxious       Results Review:    I have reviewed the labs, radiology results and diagnostic studies.    Results from last 7 days   Lab Units 08/03/20  0615   WBC 10*3/mm3 10.35   HEMOGLOBIN g/dL 16.3*   PLATELETS 10*3/mm3 258     Results from last 7 days   Lab Units 08/03/20  0615   SODIUM mmol/L 140   POTASSIUM mmol/L 3.4*   CO2 mmol/L 23.6   CREATININE mg/dL 0.66   GLUCOSE mg/dL 165*       Culture Data: No results found for: BLOODCX, URINECX, WOUNDCX, MRSACX, RESPCX, STOOLCX  Radiology Data:   Cardiology Data:    I have reviewed the medications.    Assessment/Plan     Assessment/Plan:  1.  Acute bilateral frontal lobe infarcts-patient has been started on aspirin.  I did speak with neurology on-call Dr. Tom at [facility] in  [place] who recommend evaluation for cardiac cause.   She is on tele.  Cardiology consulted.  2 D echo ordered.   Cardotid duplex has been done.  Counseled on smoking cessation.   PT/OT consult and speech consult.  2.  Hypertensive urgency-  BP improving.  Clonidine has been discontinued.  3.  Anxiety  4.  Tobacco abuse- Counseling  5.  History of breast cancer  6.  Borderline diabetes mellitus-  Diabetes educator to consult.            DVT prophylaxis:  Discharge Planning:   [facility] 08/03/20 13:24        Electronically signed by Belinda Jerez APRN at 08/03/20 1357          Consult Notes (last 24 hours) (Notes from 08/03/20 1054 through 08/04/20 1054)      Robb Sheets MD at 08/03/20 1227      Consult Orders    1. Inpatient Cardiology Consult [[id number]] ordered by Belinda Jerez APRN at 08/03/20 1213                     [facility] Cardiology Consult Note    Crystal Ragsdale  1950  [id number]  08/03/20    Requesting Physician: Efe Jasso DO    Chief Complaint: Left-sided weakness    History of Present Illness:   Mrs. Crystal Ragsdale is a 69 y.o. female who is being seen by Cardiology for evaluation of an acute CVA with suspected underlying embolic etiology.  The patient has a past medical history significant for chronic tobacco use with 1 pack/day use, hypertension, and anxiety/depression.  She does not have any significant past cardiac history.  She presented to [facility] on 8/3 for evaluation of acute left-sided weakness.  Patient reports she was in her usual state of health, until yesterday evening when she awoke from a nap with a \"heaviness\" in her left lower leg.  She then noted difficulty with ambulation due to her left-sided weakness.  She also notes associated left upper extremity weakness.  No chest pain or exertional dyspnea.  She does note a mild diffuse headache associated with her left-sided weakness.  Given symptoms, she " called EMS for evaluation.     At the time of initial EMS evaluation, she was severely hypertensive with initial /123 mmHg.  Upon arrival to [facility], she remained hypertensive with initial BP of 236/141 mmHg.  The patient was given hydralazine and a clonidine patch, with subsequent improvement in BP control.  A subsequent MRI revealed acute bilateral frontal lobe infarcts.  Medical management was recommended by neurology.  Given concern for underlying cardioembolic etiology, Cardiology has been consulted for further recommendations.      Prior Cardiac Testin. Last Coronary Angio: None  2. Prior Stress Testing: None  3. Last Echo: Pending  4. Prior Holter Monitor: None    Review of Systems:   Review of Systems   Constitutional: Negative for activity change, appetite change, chills, diaphoresis, fatigue, fever, unexpected weight gain and unexpected weight loss.   Eyes: Negative for blurred vision and double vision.   Respiratory: Negative for cough, chest tightness, shortness of breath and wheezing.    Cardiovascular: Negative for chest pain, palpitations and leg swelling.   Gastrointestinal: Negative for abdominal pain, anal bleeding, blood in stool and GERD.   Endocrine: Negative for cold intolerance and heat intolerance.   Genitourinary: Negative for hematuria.   Musculoskeletal: Positive for gait problem.   Neurological: Positive for weakness. Negative for dizziness, syncope and light-headedness.   Hematological: Does not bruise/bleed easily.   Psychiatric/Behavioral: Negative for depressed mood and stress. The patient is not nervous/anxious.        Past Medical History:   Past Medical History:   Diagnosis Date   • Cancer (CMS/HCC)     Breast Cancer       Past Surgical History:   Past Surgical History:   Procedure Laterality Date   • BREAST LUMPECTOMY Right    • HYSTERECTOMY         Family History:   Family History   Problem Relation Age of Onset   • No Known Problems Mother    • No  Known Problems Father    • No Known Problems Sister    • No Known Problems Brother        Social History:   Social History     Socioeconomic History   • Marital status:      Spouse name: Not on file   • Number of children: Not on file   • Years of education: Not on file   • Highest education level: Not on file   Tobacco Use   • Smoking status: Current Every Day Smoker     Packs/day: 1.00   • Smokeless tobacco: Never Used   Substance and Sexual Activity   • Alcohol use: No   • Drug use: No   • Sexual activity: Defer       Medications:     Current Facility-Administered Medications:   •  acetaminophen (TYLENOL) tablet 650 mg, 650 mg, Oral, Q4H PRN **OR** acetaminophen (TYLENOL) 160 MG/5ML solution 650 mg, 650 mg, Oral, Q4H PRN **OR** acetaminophen (TYLENOL) suppository 650 mg, 650 mg, Rectal, Q4H PRN, Efe Jasso,   •  aspirin chewable tablet 81 mg, 81 mg, Oral, Daily, Efe Jasso DO, 81 mg at 08/03/20 0941  •  bisacodyl (DULCOLAX) suppository 10 mg, 10 mg, Rectal, Daily PRN, Efe Jasso DO  •  carvedilol (COREG) tablet 25 mg, 25 mg, Oral, BID With Meals, Efe Jasso DO, 25 mg at 08/03/20 0941  •  cloNIDine (CATAPRES-TTS) 0.2 MG/24HR patch 1 patch, 1 patch, Transdermal, Weekly, Efe Jasso DO, 1 patch at 08/03/20 0940  •  [START ON 8/4/2020] enoxaparin (LOVENOX) syringe 60 mg, 60 mg, Subcutaneous, Q24H, Efe Jasso DO  •  hydrALAZINE (APRESOLINE) injection 10 mg, 10 mg, Intravenous, Q6H PRN, Efe Jasso DO  •  hydrOXYzine (ATARAX) tablet 25 mg, 25 mg, Oral, Q8H PRN, Efe Jasso DO, 25 mg at 08/03/20 0941  •  losartan (COZAAR) tablet 50 mg, 50 mg, Oral, Daily, Efe Jasso DO, 50 mg at 08/03/20 0941  •  melatonin tablet 5 mg, 5 mg, Oral, Nightly PRN, Efe Jasso,   •  nicotine (NICODERM CQ) 21 MG/24HR patch 1 patch, 1 patch, Transdermal, Q24H, Efe Jasso DO, 1 patch at  08/03/20 0939  •  ondansetron (ZOFRAN) injection 4 mg, 4 mg, Intravenous, Q6H PRN, Efe Jasso DO  •  [COMPLETED] Insert peripheral IV, , , Once **AND** sodium chloride 0.9 % flush 10 mL, 10 mL, Intravenous, PRN, Deng Taylor, DO  •  sodium chloride 0.9 % flush 10 mL, 10 mL, Intravenous, Q12H, Efe Jasso DO, 10 mL at 08/03/20 1000  •  sodium chloride 0.9 % flush 10 mL, 10 mL, Intravenous, PRN, Efe Jasso,     Allergies:   No Known Allergies    Physical Exam:  Vital Signs:   Vitals:    08/03/20 0550 08/03/20 0742 08/03/20 0933 08/03/20 1218   BP:  (!) 189/90  Comment: was on  thewith daughter,she got worked up,will recheck 148/82 148/89   BP Location:  Left arm Left arm Left arm   Patient Position:  Sitting Lying Sitting   Pulse:   67 68   Resp:  17  17   Temp:    97.6 °F (36.4 °C)   TempSrc:  Oral  Oral   SpO2:  94%  98%   Weight: 103 kg (227 lb 4.7 oz)      Height:           Physical Exam   Constitutional: She is oriented to person, place, and time. She appears well-developed and well-nourished. No distress.   HENT:   Head: Normocephalic and atraumatic.   Moist Mucous Membranes.    Eyes: Pupils are equal, round, and reactive to light. EOM are normal. No scleral icterus.   Neck: No tracheal deviation present.   Cardiovascular: Normal rate, regular rhythm, normal heart sounds and intact distal pulses. Exam reveals no gallop and no friction rub.   No murmur heard.  Normal JVD.   Pulmonary/Chest: Effort normal and breath sounds normal. No stridor. No respiratory distress. She has no wheezes. She has no rales. She exhibits no tenderness.   Abdominal: Soft. Bowel sounds are normal. She exhibits no distension. There is no tenderness. There is no rebound and no guarding.   Musculoskeletal: Normal range of motion. She exhibits no edema.   Lymphadenopathy:     She has no cervical adenopathy.   Neurological: She is alert and oriented to person, place, and time.   Mild left upper and  lower extremity weakness.   Skin: Skin is warm and dry. She is not diaphoretic. No erythema.   Psychiatric: She has a normal mood and affect. Her behavior is normal.   Nursing note and vitals reviewed.      Results Review:   Results from last 7 days   Lab Units 08/03/20  0615 08/03/20  0028   SODIUM mmol/L 140 140   POTASSIUM mmol/L 3.4* 3.7   CHLORIDE mmol/L 104 102   CO2 mmol/L 23.6 25.6   BUN mg/dL 10 11   CREATININE mg/dL 0.66 0.74   CALCIUM mg/dL 9.2 9.6   BILIRUBIN mg/dL  --  0.2   ALK PHOS U/L  --  133*   ALT (SGPT) U/L  --  13   AST (SGOT) U/L  --  14   GLUCOSE mg/dL 165* 154*     Results from last 7 days   Lab Units 08/03/20  0028   TROPONIN T ng/mL <0.010     Results from last 7 days   Lab Units 08/03/20  0615 08/03/20  0028   WBC 10*3/mm3 10.35 9.43   HEMOGLOBIN g/dL 16.3* 17.0*   HEMATOCRIT % 46.5 47.9*   PLATELETS 10*3/mm3 258 263     Results from last 7 days   Lab Units 08/03/20  0028   INR  0.93     Results from last 7 days   Lab Units 08/03/20  0028   MAGNESIUM mg/dL 2.3     Results from last 7 days   Lab Units 08/03/20  0615   CHOLESTEROL mg/dL 139   TRIGLYCERIDES mg/dL 98   HDL CHOL mg/dL 36*   LDL CHOL mg/dL 83       I personally viewed and interpreted the patient's EKG/Telemetry data     Assessment / Plan:     1.  Acute CVA  --MRI imaging with bilateral frontal lobe infarcts, suspected underlying cardioembolic etiology  --Currently in NSR with no evidence of PAF on telemetry thus far  --Echocardiogram with preserved LV systolic function, no significant valvular abnormalities  --Recommend telemetry monitoring on telemetry while inpatient  --If no PAF identified on inpatient monitoring, would then consider placement of an MCOT on discharge with further consideration of a loop recorder if no underlying atrial fibrillation identified on MCOT    2.  Hypertension  --Uncontrolled on presentation  --Will allow permissive hypertension for 24 hours with as needed hydralazine to maintain systolic BP less  than 220 mmHg  --Continue carvedilol and losartan  --Discontinue clonidine patch, will initiate Norvasc tomorrow morning and uptitrate as required for BP control    3.  Chronic tobacco use  --Complete cessation advised      Thank you for allowing me to participate in the care of your patient. Please do not hesitate to contact me with additional questions or concerns.     BREE Sheets MD  Interventional Cardiology   20  12:28 PM    Electronically signed by Robb Sheets MD at 20 1327          Physical Therapy Notes (last 24 hours) (Notes from 20 1054 through 20 1054)      Billie Lawson, PT at 20 1600  Version 1 of 1         Problem: Patient Care Overview  Goal: Plan of Care Review  Outcome: Ongoing (interventions implemented as appropriate)  Flowsheets (Taken 8/3/2020 1248)  Outcome Summary: Patient participates  well in skilled PT evaluation and demonstrates decreased strength, balance, transfers and gait.  She has decreased strength and coordination in her left leg with resulting knee hyperextension at midstance.  She is expected to benefit from additional PT services while hospitalized and upon discharge to inpatient rehab.       Electronically signed by Billie Lawson PT at 20 1600     Billie Lawson PT at 20 1604  Version 1 of 1         Patient Name: Crystal Ragsdale  : 1950    MRN: [id number]                              Today's Date: 8/3/2020       Admit Date: 8/3/2020    Visit Dx:     ICD-10-CM ICD-9-CM   1. Hypertensive urgency I16.0 401.9     Patient Active Problem List   Diagnosis   • Essential hypertension   • Anxiety associated with depression   • Hyperglycemia   • Encounter for Medicare annual wellness exam   • Tobacco abuse counseling   • Chronic pain of left ankle   • Colon cancer screening   • Hypertensive urgency     Past Medical History:   Diagnosis Date   • Cancer (CMS/HCC)     Breast Cancer   • Impaired functional mobility, balance, gait, and  endurance      Past Surgical History:   Procedure Laterality Date   • BREAST LUMPECTOMY Right 2000   • HYSTERECTOMY  1994     General Information     Row Name 08/03/20 1248          PT Evaluation Time/Intention    Document Type  evaluation  -     Mode of Treatment  physical therapy  -     Row Name 08/03/20 1248          General Information    Patient Profile Reviewed?  yes  -JR     Prior Level of Function  independent:;community mobility  -     Existing Precautions/Restrictions  fall  -     Row Name 08/03/20 1248          Relationship/Environment    Lives With  alone  -     Row Name 08/03/20 1248          Resource/Environmental Concerns    Current Living Arrangements  home/apartment/condo  -     Row Name 08/03/20 1248          Cognitive Assessment/Intervention- PT/OT    Orientation Status (Cognition)  oriented x 4  -     Row Name 08/03/20 1248          Safety Issues, Functional Mobility    Impairments Affecting Function (Mobility)  sensation/sensory awareness;motor control  -       User Key  (r) = Recorded By, (t) = Taken By, (c) = Cosigned By    Initials Name Provider Type    JR Billie Lawson, PT Physical Therapist        Mobility     Row Name 08/03/20 1248          Bed Mobility Assessment/Treatment    Bed Mobility Assessment/Treatment  supine-sit;sit-supine  -     Supine-Sit [place] (Bed Mobility)  conditional independence  -     Sit-Supine [place] (Bed Mobility)  conditional independence  -     Assistive Device (Bed Mobility)  head of bed elevated  -     Row Name 08/03/20 1248          Sit-Stand Transfer    Sit-Stand [place] (Transfers)  minimum assist (75% patient effort)  -     Assistive Device (Sit-Stand Transfers)  -- HHA x 2 and gait belt  -     Row Name 08/03/20 1248          Gait/Stairs Assessment/Training    Gait/Stairs Assessment/Training  gait/ambulation independence;gait/ambulation assistive device  -     [place] Level (Gait)  minimum assist (75% patient  effort);2 person assist  -JR     Assistive Device (Gait)  -- HHA x 2 and gait belt  -JR     Distance in Feet (Gait)  3  -JR     Pattern (Gait)  step-to  -JR     Deviations/Abnormal Patterns (Gait)  left sided deviations  -JR     Bilateral Gait Deviations  heel strike decreased  -JR     Left Sided Gait Deviations  knee hyperextension;heel strike decreased  -JR       User Key  (r) = Recorded By, (t) = Taken By, (c) = Cosigned By    Initials Name Provider Type    JR Billie Lawson PT Physical Therapist        Obj/Interventions     [facility] Name 08/03/20 1248          General ROM    GENERAL ROM COMMENTS  BLE=WFL  -[facility] Name 08/03/20 1248          MMT (Manual Muscle Testing)    General MMT Comments  LLE=3-/5 with decreased coordination  -[facility] Name 08/03/20 1248          Static Sitting Balance    Level of [place] (Unsupported Sitting, Static Balance)  conditional independence  -     Sitting Position (Unsupported Sitting, Static Balance)  sitting on edge of bed  -     Time Able to Maintain Position (Unsupported Sitting, Static Balance)  4 to 5 minutes  -[facility] Name 08/03/20 1248          Dynamic Sitting Balance    Level of [place], Reaches Outside Midline (Sitting, Dynamic Balance)  supervision  -     Sitting Position, Reaches Outside Midline (Sitting, Dynamic Balance)  sitting on edge of bed  -[facility] Name 08/03/20 1248          Static Standing Balance    Level of [place] (Supported Standing, Static Balance)  contact guard assist  -     Time Able to Maintain Position (Supported Standing, Static Balance)  3 to 4 minutes  -     Assistive Device Utilized (Supported Standing, Static Balance)  -- HHA x 2 gait belt  -JR     Row Name 08/03/20 1248          Dynamic Standing Balance    Level of [place], Reaches Outside Midline (Standing, Dynamic Balance)  minimal assist, 75% patient effort  -     Time Able to Maintain Position, Reaches Outside Midline (Standing, Dynamic Balance)  4 to 5  minutes  -JR     Assistive Device Utilized (Supported Standing, Dynamic Balance)  -- HHA x 2 and gait belt  -JR       User Key  (r) = Recorded By, (t) = Taken By, (c) = Cosigned By    Initials Name Provider Type    Billie Miguel, PT Physical Therapist        Goals/Plan     Row Name 08/03/20 1248          Transfer Goal 1 (PT)    Activity/Assistive Device (Transfer Goal 1, PT)  sit-to-stand/stand-to-sit;bed-to-chair/chair-to-bed  -JR     [place] Level/Cues Needed (Transfer Goal 1, PT)  supervision required  -JR     Time Frame (Transfer Goal 1, PT)  1 week  -JR     Progress/Outcome (Transfer Goal 1, PT)  goal ongoing  -[facility] Name 08/03/20 1248          Gait Training Goal 1 (PT)    Activity/Assistive Device (Gait Training Goal 1, PT)  gait (walking locomotion);assistive device use;walker, rolling  -JR     [place] Level (Gait Training Goal 1, PT)  contact guard assist  -JR     Distance (Gait Goal 1, PT)  50  -JR     Time Frame (Gait Training Goal 1, PT)  2 weeks  -JR     Progress/Outcome (Gait Training Goal 1, PT)  goal ongoing  -JR     Row Name 08/03/20 1249          Patient Education Goal (PT)    Activity (Patient Education Goal, PT)  Perform HEP x 15  -JR     [place]/Cues/Accuracy (Memory Goal 2, PT)  demonstrates adequately  -JR     Time Frame (Patient Education Goal, PT)  10 days  -JR     Progress/Outcome (Patient Education Goal, PT)  goal ongoing  -JR       User Key  (r) = Recorded By, (t) = Taken By, (c) = Cosigned By    Initials Name Provider Type    Billie Miguel, PT Physical Therapist        Clinical Impression     [facility] Name 08/03/20 1248          Pain Assessment    Additional Documentation  Pain Scale: Numbers Pre/Post-Treatment (Group)  -JR     Row Name 08/03/20 124          Pain Scale: Numbers Pre/Post-Treatment    Pain Scale: Numbers, Pretreatment  0/10 - no pain  -JR     Pain Scale: Numbers, Post-Treatment  0/10 - no pain  -JR     Pain Intervention(s)  Ambulation/increased  activity;Repositioned  -JR     Row Name 08/03/20 1248          Plan of Care Review    Plan of Care Reviewed With  patient  -JR     Row Name 08/03/20 1248          Physical Therapy Clinical Impression    Patient/Family Goals Statement (PT Clinical Impression)  Patient wants to return to normal activity  -JR     Criteria for Skilled Interventions Met (PT Clinical Impression)  yes;treatment indicated  -JR     Rehab Potential (PT Clinical Summary)  good, to achieve stated therapy goals  -JR     Row Name 08/03/20 1248          Positioning and Restraints    Pre-Treatment Position  in bed  -JR     Post Treatment Position  bed  -JR     In Bed  supine;call light within reach;encouraged to call for assist  -JR       User Key  (r) = Recorded By, (t) = Taken By, (c) = Cosigned By    Initials Name Provider Type    Billie Miguel PT Physical Therapist        Outcome Measures     Row Name 08/03/20 1248          How much help from another person do you currently need...    Turning from your back to your side while in flat bed without using bedrails?  4  -JR     Moving from lying on back to sitting on the side of a flat bed without bedrails?  4  -JR     Moving to and from a bed to a chair (including a wheelchair)?  2  -JR     Standing up from a chair using your arms (e.g., wheelchair, bedside chair)?  2  -JR     Climbing 3-5 steps with a railing?  1  -JR     To walk in hospital room?  2  -JR     AM-PAC 6 Clicks Score (PT)  15  -JR     Row Name 08/03/20 1248          Functional Assessment    Outcome Measure Options  AM-PAC 6 Clicks Basic Mobility (PT)  -JR       User Key  (r) = Recorded By, (t) = Taken By, (c) = Cosigned By    Initials Name Provider Type    Billie Miguel PT Physical Therapist        Physical Therapy Education                 Title: PT OT SLP Therapies (In Progress)     Topic: Physical Therapy (In Progress)     Point: Mobility training (Done)     Description:   Instruct learner(s) on safety and technique for  assisting patient out of bed, chair or wheelchair.  Instruct in the proper use of assistive devices, such as walker, crutches, cane or brace.              Patient Friendly Description:   It's important to get you on your feet again, but we need to do so in a way that is safe for you. Falling has serious consequences, and your personal safety is the most important thing of all.        When it's time to get out of bed, one of us or a family member will sit next to you on the bed to give you support.     If your doctor or nurse tells you to use a walker, crutches, a cane, or a brace, be sure you use it every time you get out of bed, even if you think you don't need it.    Learning Progress Summary           Patient Acceptance, E,TB, VU by  at 8/3/2020 9710    Comment:  Role of PT while hospitalized and upon discharge to inpatient rehab                   Point: Home exercise program (Not Started)     Description:   Instruct learner(s) on appropriate technique for monitoring, assisting and/or progressing patient with therapeutic exercises and activities.              Learner Progress:   Not documented in this visit.          Point: Body mechanics (Not Started)     Description:   Instruct learner(s) on proper positioning and spine alignment for patient and/or caregiver during mobility tasks and/or exercises.              Learner Progress:   Not documented in this visit.          Point: Precautions (Not Started)     Description:   Instruct learner(s) on prescribed precautions during mobility and gait tasks              Learner Progress:   Not documented in this visit.                      User Key     Initials Effective Dates Name Provider Type Discipline     04/03/18 -  Billie Lawson, PT Physical Therapist PT              PT Recommendation and Plan  Planned Therapy Interventions (PT Eval): strengthening, motor coordination training, neuromuscular re-education, balance training, transfer training, gait training, home  exercise program, patient/family education  Outcome Summary/Treatment Plan (PT)  Anticipated Discharge Disposition (PT): inpatient rehabilitation facility  Plan of Care Reviewed With: patient  Outcome Summary: Patient participates  well in skilled PT evaluation and demonstrates decreased strength, balance, transfers and gait.  She has decreased strength and coordination in her left leg with resulting knee hyperextension at midstance.  She is expected to benefit from additional PT services while hospitalized and upon discharge to inpatient rehab.     Time Calculation:   PT Charges     Row Name 20 1603             Time Calculation    Start Time  1248  -JR      PT Received On  20  -      PT Goal Re-Cert Due Date  20  -        User Key  (r) = Recorded By, (t) = Taken By, (c) = Cosigned By    Initials Name Provider Type    Billie Miguel PT Physical Therapist        Therapy Charges for Today     Code Description Service Date Service Provider Modifiers Qty    56336049914 HC PT EVAL LOW COMPLEXITY 4 8/3/2020 Billie Lawson, PT GP 1          PT G-Codes  Outcome Measure Options: AM-PAC 6 Clicks Basic Mobility (PT)  AM-PAC 6 Clicks Score (PT): 15  AM-PAC 6 Clicks Score (OT): 18    Billie Lawson PT  8/3/2020         Electronically signed by Billie Lawson PT at 20 1605          Occupational Therapy Notes (last 24 hours) (Notes from 20 1054 through 20 1054)      Roseline Monroy at 20 1423          Acute Care - Occupational Therapy Initial Evaluation   [place]     Patient Name: Crystal Ragsdale  : 1950  MRN: [id number]  Today's Date: 8/3/2020  Onset of Illness/Injury or Date of Surgery: 20  Date of Referral to OT: 20  Referring Physician: Dr. Kruse    Admit Date: 8/3/2020       ICD-10-CM ICD-9-CM   1. Hypertensive urgency I16.0 401.9     Patient Active Problem List   Diagnosis   • Essential hypertension   • Anxiety associated with depression   • Hyperglycemia   •  Encounter for Medicare annual wellness exam   • Tobacco abuse counseling   • Chronic pain of left ankle   • Colon cancer screening   • Hypertensive urgency     Past Medical History:   Diagnosis Date   • Cancer (CMS/HCC)     Breast Cancer     Past Surgical History:   Procedure Laterality Date   • BREAST LUMPECTOMY Right 2000   • HYSTERECTOMY  1994          OT ASSESSMENT FLOWSHEET (last 12 hours)      Occupational Therapy Evaluation     Row Name 08/03/20 1244                   OT Evaluation Time/Intention    Subjective Information  complains of;weakness  -        Document Type  evaluation  -        Mode of Treatment  occupational therapy  -        Patient Effort  good  -        Symptoms Noted During/After Treatment  none  -           General Information    Patient Profile Reviewed?  yes  -        Onset of Illness/Injury or Date of Surgery  08/03/20  -        Referring Physician  Dr. Kruse  -        Patient Observations  alert;cooperative;agree to therapy  -        Patient/Family Observations  Pt alone  -        General Observations of Patient  Pt received sitting upright in bed   -        Prior Level of Function  independent:;community mobility;ADL's  -        Equipment Currently Used at Home  none  -        Existing Precautions/Restrictions  fall  -        Risks Reviewed  patient:;increased discomfort  -        Benefits Reviewed  patient:;improve function;increase independence;increase strength  -           Relationship/Environment    Lives With  alone  -           Resource/Environmental Concerns    Current Living Arrangements  home/apartment/condo  -           Cognitive Assessment/Intervention- PT/OT    Orientation Status (Cognition)  oriented x 4  -        Follows Commands (Cognition)  WFL  -           Safety Issues, Functional Mobility    Impairments Affecting Function (Mobility)  motor control;sensation/sensory awareness  -           Bed Mobility Assessment/Treatment     Bed Mobility Assessment/Treatment  supine-sit;sit-supine  -        Supine-Sit [place] (Bed Mobility)  conditional independence  -        Sit-Supine [place] (Bed Mobility)  conditional independence  -        Assistive Device (Bed Mobility)  head of bed elevated  -           Functional Mobility    Functional Mobility- Ind. Level  minimum assist (75% patient effort);2 person assist required  -        Functional Mobility- Device  other (see comments) HHA of 2 and gait belt  -        Functional Mobility-Distance (Feet)  3  -        Functional Mobility- Comment  Pt walked 3' forward, 3' backward  -           Transfer Assessment/Treatment    Transfer Assessment/Treatment  sit-stand transfer;stand-sit transfer  -           Sit-Stand Transfer    Sit-Stand [place] (Transfers)  minimum assist (75% patient effort)  -           Stand-Sit Transfer    Stand-Sit [place] (Transfers)  minimum assist (75% patient effort)  -           ADL Assessment/Intervention    BADL Assessment/Intervention  bathing;upper body dressing;lower body dressing;grooming;feeding;toileting  -           Bathing Assessment/Intervention    Bathing [place] Level  minimum assist (75% patient effort)  -           Upper Body Dressing Assessment/Training    Upper Body Dressing [place] Level  minimum assist (75% patient effort)  -           Lower Body Dressing Assessment/Training    Lower Body Dressing [place] Level  minimum assist (75% patient effort)  -           Grooming Assessment/Training    [place] Level (Grooming)  minimum assist (75% patient effort)  -           Self-Feeding Assessment/Training    [place] Level (Feeding)  set up  -           Toileting Assessment/Training    [place] Level (Toileting)  minimum assist (75% patient effort)  -           BADL Safety/Performance    Impairments, BADL Safety/Performance  balance;endurance/activity  tolerance;grasp/prehension;coordination;motor control;strength  -           General ROM    GENERAL ROM COMMENTS  BUPREETI WFL  -           MMT (Manual Muscle Testing)    General MMT Comments  DOMO MARRERO 4-/5  -           Positioning and Restraints    Pre-Treatment Position  in bed  -        Post Treatment Position  bed  -        In Bed  supine;call light within reach;encouraged to call for assist  -           Pain Assessment    Additional Documentation  Pain Scale: Numbers Pre/Post-Treatment (Group)  -           Pain Scale: Numbers Pre/Post-Treatment    Pain Scale: Numbers, Pretreatment  0/10 - no pain  -        Pain Scale: Numbers, Post-Treatment  0/10 - no pain  -           Coping    Observed Emotional State  accepting;cooperative  -        Verbalized Emotional State  acceptance  -           Plan of Care Review    Plan of Care Reviewed With  patient  -        Progress  no change  -        Outcome Summary  Pt seen for OT evaluation today.  Pt presents with weakness and decreased independence with self care and functional mobility tasks. Pt is expected to benefit from skilled OT to improve her strength and independence with ADL tasks.  -           Clinical Impression (OT)    Date of Referral to OT  08/03/20  -        OT Diagnosis  ADL decline  -        Patient/Family Goals Statement (OT Eval)  Pt wants to d/c home, but is agreeable to consider rehab  -        Criteria for Skilled Therapeutic Interventions Met (OT Eval)  yes;treatment indicated  -        Rehab Potential (OT Eval)  good, to achieve stated therapy goals  -        Therapy Frequency (OT Eval)  3 times/wk  -        Care Plan Review (OT)  evaluation/treatment results reviewed;patient/other agree to care plan  -        Anticipated Discharge Disposition (OT)  inpatient rehabilitation facility  -           OT Goals    Transfer Goal Selection (OT)  transfer, OT goal 1  -        Bathing Goal Selection (OT)  bathing, OT  goal 1  -AH        Dressing Goal Selection (OT)  dressing, OT goal 1  -AH        Strength Goal Selection (OT)  strength, OT goal 1  -AH        Functional Mobility Goal Selection (OT)  functional mobility, OT goal 1  -AH        Additional Documentation  Strength Goal Selection (OT) (Row);Functional Mobility Selection (OT) (Row)  -AH           Transfer Goal 1 (OT)    Activity/Assistive Device (Transfer Goal 1, OT)  sit-to-stand/stand-to-sit;walker, rolling  -AH        [place] Level/Cues Needed (Transfer Goal 1, OT)  supervision required  -AH        Time Frame (Transfer Goal 1, OT)  by discharge  -AH        Progress/Outcome (Transfer Goal 1, OT)  goal ongoing  -AH           Bathing Goal 1 (OT)    Activity/Assistive Device (Bathing Goal 1, OT)  bathing skills, all  -AH        [place] Level/Cues Needed (Bathing Goal 1, OT)  supervision required  -AH        Time Frame (Bathing Goal 1, OT)  by discharge  -AH        Progress/Outcomes (Bathing Goal 1, OT)  goal ongoing  -AH           Dressing Goal 1 (OT)    Activity/Assistive Device (Dressing Goal 1, OT)  dressing skills, all  -AH        [place]/Cues Needed (Dressing Goal 1, OT)  contact guard assist  -AH        Time Frame (Dressing Goal 1, OT)  by discharge  -AH        Progress/Outcome (Dressing Goal 1, OT)  goal ongoing  -AH           Strength Goal 1 (OT)    Strength Goal 1 (OT)  Pt will perform UB strengthening ex using theraband for resistance.  -AH        Time Frame (Strength Goal 1, OT)  by discharge  -AH        Progress/Outcome (Strength Goal 1, OT)  goal ongoing  -AH           Functional Mobility Goal 1 (OT)    Activity/Assistive Device (Functional Mobility Goal 1, OT)  walker, rolling  -AH        [place] Level/Cues Needed (Functional Mobility Goal 1, OT)  contact guard assist  -AH        Distance Goal 1 (Functional Mobility, OT)  50  -AH        Time Frame (Functional Mobility Goal 1, OT)  long term goal (LTG);10 days  -AH        Progress/Outcome  (Functional Mobility Goal 1, OT)  goal ongoing  -          User Key  (r) = Recorded By, (t) = Taken By, (c) = Cosigned By    Initials Name Effective Dates    Roseline Ramírez 03/07/18 -          Occupational Therapy Education                 Title: PT OT SLP Therapies (In Progress)     Topic: Occupational Therapy (In Progress)     Point: ADL training (Done)     Description:   Instruct learner(s) on proper safety adaptation and remediation techniques during self care or transfers.   Instruct in proper use of assistive devices.              Learning Progress Summary           Patient Acceptance, E,TB, VU by  at 8/3/2020 1421    Comment:  Role of OT/POC                   Point: Home exercise program (Not Started)     Description:   Instruct learner(s) on appropriate technique for monitoring, assisting and/or progressing therapeutic exercises/activities.              Learner Progress:   Not documented in this visit.          Point: Precautions (Not Started)     Description:   Instruct learner(s) on prescribed precautions during self-care and functional transfers.              Learner Progress:   Not documented in this visit.          Point: Body mechanics (Not Started)     Description:   Instruct learner(s) on proper positioning and spine alignment during self-care, functional mobility activities and/or exercises.              Learner Progress:   Not documented in this visit.                      User Key     Initials Effective Dates Name Provider Type Discipline     03/07/18 -  Roseline Monroy Occupational Therapist OT                  OT Recommendation and Plan  Outcome Summary/Treatment Plan (OT)  Anticipated Discharge Disposition (OT): inpatient rehabilitation facility  Therapy Frequency (OT Eval): 3 times/wk  Plan of Care Review  Plan of Care Reviewed With: patient  Plan of Care Reviewed With: patient  Outcome Summary: Pt seen for OT evaluation today.  Pt presents with weakness and decreased independence with  self care and functional mobility tasks. Pt is expected to benefit from skilled OT to improve her strength and independence with ADL tasks.    Outcome Measures     Row Name 08/03/20 1244             How much help from another is currently needed...    Putting on and taking off regular lower body clothing?  3  -AH      Bathing (including washing, rinsing, and drying)  3  -AH      Toileting (which includes using toilet bed pan or urinal)  3  -AH      Putting on and taking off regular upper body clothing  3  -AH      Taking care of personal grooming (such as brushing teeth)  3  -AH      Eating meals  3  -AH      AM-PAC 6 Clicks Score (OT)  18  -         Functional Assessment    Outcome Measure Options  AM-PAC 6 Clicks Daily Activity (OT)  -        User Key  (r) = Recorded By, (t) = Taken By, (c) = Cosigned By    Initials Name Provider Type    Roseline Ramírez Occupational Therapist          Time Calculation:   Time Calculation- OT     Row Name 08/03/20 1423             Time Calculation- OT    OT Start Time  1244  -      OT Received On  08/03/20  -      OT Goal Re-Cert Due Date  08/13/20  -        User Key  (r) = Recorded By, (t) = Taken By, (c) = Cosigned By    Initials Name Provider Type    Roseline Ramírez Occupational Therapist        Therapy Charges for Today     Code Description Service Date Service Provider Modifiers Qty    [id number]  OT EVAL LOW COMPLEXITY 3 8/3/2020 Roseline Monroy GO 1               Roseline Monroy  8/3/2020    Electronically signed by Roseline Monroy at 08/03/20 1423     Roseline Monroy at 08/03/20 1422          Problem: Patient Care Overview  Goal: Plan of Care Review  Outcome: Ongoing (interventions implemented as appropriate)  Flowsheets (Taken 8/3/2020 1244)  Progress: no change  Plan of Care Reviewed With: patient  Outcome Summary: Pt seen for OT evaluation today.  Pt presents with weakness and decreased independence with self care and functional mobility tasks. Pt is  expected to benefit from skilled OT to improve her strength and independence with ADL tasks.       Electronically signed by Roseline Monroy at 08/03/20 2939

## 2020-08-04 NOTE — PLAN OF CARE
Problem: Patient Care Overview  Goal: Plan of Care Review  Outcome: Ongoing (interventions implemented as appropriate)  Flowsheets (Taken 8/4/2020 0811)  Progress: improving  Plan of Care Reviewed With: patient  Note:   Pt admit for HTN much improved this shift decreased throughout shift gave first PRN BP med this am no complaints of pain no N/V will continue to monitor.

## 2020-08-04 NOTE — PROGRESS NOTES
PROGRESS NOTE        Date of Admission: 8/3/2020  Length of Stay: 0  Primary Care Physician: Vermeesch, Marilyn K, MD    Subjective   Chief Complaint:Follow up uncontrolled hypertension and CVA   HPI: This is a 69-year-old female with a history of hypertension, anxiety, chronic tobacco abuse who presented to the emergency room with complaints of onset of left-sided numbness and weakness.  She been in her normal state of health earlier in the day when she took a nap and upon awakening she felt a heaviness in her left lower extremity.  She denied any visual changes, chest pain, slurred speech.  Upon evaluation in the emergency room her systolic blood pressure was 245.  Her blood pressure was noted to be 247/123.  She was given antihypertensive in the emergency room.  CT of the head was negative for acute process.  Given her symptoms and blood pressure she was admitted to the hospitalist service for further medical management.  Upon admission patient was treated for hypertensive urgency.  2D echo as well as a carotid duplex were ordered.  An MRI of the head was also done which did show findings consistent with acute infarcts involving both frontal lobes.  I did speak with Dr. Pinto with neurology on-call at [facility] in [place] regarding these findings who stated that there was no intervention at this point that can be done as patient is well outside the window for intervention.  She did recommend given that it is in the bilateral frontal lobes to look for an arrhythmia or cardiac cause.  Dr. Sheets with cardiology has been consulted.  Patient is on cardiac telemetry for monitoring.  I have reviewed telemetry overnight.  She has had no evidence of arrhythmia.      The patient does drive and mow her own yard.  I have advised that she will need to follow-up with neurology and cannot drive or operate any equipment until she is cleared by neurologist.  PT OT has been consulted for strength and mobility.  She denies  any difficulty swallowing.  Speech therapy was consulted and did bedside evaluation.  She has recommended regular diet with then liquids.  No evidence of aspiration.  .  She has been started on aspirin.  We will continue on Lovenox for DVT prophylaxis.  She has been advised to discontinue smoking.  She does have a borderline hemoglobin A1c at 6.3 for which diabetes consult has been requested.     After a long discussion with the patient, she does not feel like she can go home at this point due to weakness.  Case management has been consulted.  She actually appears to be doing some better today and appears to be stronger on her left side than previous.         Review Of Systems:   Review of Systems   General ROS: Patient denies any fevers, chills or loss of consciousness.    Psychological ROS: Anxiety  Ophthalmic ROS: Denies any diplopia or transient loss of vision.  ENT ROS: Denies sore throat, nasal congestion or ear pain.   Hematological and Lymphatic ROS: Denies neck swelling or easy bleeding.  Endocrine ROS: Denies any recent unintentional weight gain or loss.  Respiratory ROS: Denies cough or shortness of breath.   Cardiovascular ROS: Denies chest pain or palpitations. No history of exertional chest pain.   Gastrointestinal ROS: Denies nausea and vomiting. Denies any abdominal pain. No diarrhea.  Genito-Urinary ROS: Denies dysuria or hematuria.  Musculoskeletal ROS: Denies back pain. No muscle pain. No calf pain.   Neurological ROS: Left side weakness and numbness but does appear to be doing better.    Dermatological ROS: Denies any redness or pruritis.    Objective      Temp:  [97.5 °F (36.4 °C)-98.4 °F (36.9 °C)] 98 °F (36.7 °C)  Heart Rate:  [63-73] 73  Resp:  [16-17] 17  BP: (138-180)/() 150/68  Physical Exam    General Appearance:  Alert and cooperative, not in any acute distress.   Head:  Atraumatic and normocephalic, without obvious abnormality.   Eyes:          PERRLA, conjunctivae and sclerae  normal, no Icterus. No pallor. Extraocular movements are within normal limits.   Ears:  Ears appear intact with no abnormalities noted.   Throat: No oral lesions, no thrush, oral mucosa moist.   Neck: Supple, trachea midline, no thyromegaly, no carotid bruit.       Lungs:   Chest shape is normal. Breath sounds heard bilaterally equally.  No crackles or wheezing. No Pleural rub or bronchial breathing.   Heart:  Normal S1 and S2, no murmur, no gallop, no rub. No JVD   Abdomen:   Normal bowel sounds, no masses, no organomegaly. Soft    non-tender, non-distended, no guarding, no rebound             tenderness   Extremities: Moves all extremities well, no edema, no cyanosis, no clubbing.   Pulses: Pulses palpable and equal bilaterally   Skin: No bleeding, bruising or rash       Neurologic:          Psychiatric/Behavior:      Alert and oriented x3.  No facial drooping.  She does have decreased strength in her left lower extremity compared to the right 4 out of 5 left lower extremity 4 out of 5 left upper extremity.  Sensation is intact.    Anxious but better today.       Results Review:    I have reviewed the labs, radiology results and diagnostic studies.    Results from last 7 days   Lab Units 08/03/20  0615   WBC 10*3/mm3 10.35   HEMOGLOBIN g/dL 16.3*   PLATELETS 10*3/mm3 258     Results from last 7 days   Lab Units 08/03/20  0615   SODIUM mmol/L 140   POTASSIUM mmol/L 3.4*   CO2 mmol/L 23.6   CREATININE mg/dL 0.66   GLUCOSE mg/dL 165*       Culture Data: No results found for: BLOODCX, URINECX, WOUNDCX, MRSACX, RESPCX, STOOLCX  Radiology Data:   Cardiology Data:    I have reviewed the medications.    Assessment/Plan     Assessment/Plan:  1.  Acute bilateral frontal lobe infarcts-patient has been started on aspirin.  I did speak with neurology on-call Dr. Tom at [facility] in [place] who recommend evaluation for cardiac cause and no intervention since patient was out of window.   She is on tele and there are no  evidence of arrhythmia.  Cardiology consulted and did evaluate.  Cardiology recommended stopping Clonidine and starting Norvasc today.  2 D echo ordered and showed EF 65-70% and no significant valvular abnormalities.   Cardotid duplex normal.  Counseled on smoking cessation.   PT/OT consult and case management for rehab placement.  She has been started on aspirin and statin therapyl   2.  Hypertensive urgency-  BP improving.  Clonidine has been discontinued.  Started on norvasc.  3.  Anxiety  4.  Tobacco abuse- Counseling  5.  History of breast cancer  6.  Borderline diabetes mellitus-  Diabetes educator to consult.            DVT prophylaxis:  Lovenox  Discharge Planning:   Belinda Jerez, [facility] 08/04/20 12:03

## 2020-08-04 NOTE — THERAPY TREATMENT NOTE
Acute Care - Physical Therapy Treatment Note   [facility]     Patient Name: Crytsal Ragsdale  : 1950  MRN: [id number]  Today's Date: 2020  Onset of Illness/Injury or Date of Surgery: 20     Referring Physician: Dr. Kruse    Admit Date: 8/3/2020    Visit Dx:    ICD-10-CM ICD-9-CM   1. Hypertensive urgency I16.0 401.9     Patient Active Problem List   Diagnosis   • Essential hypertension   • Anxiety associated with depression   • Hyperglycemia   • Encounter for Medicare annual wellness exam   • Tobacco abuse counseling   • Chronic pain of left ankle   • Colon cancer screening   • Hypertensive urgency       Therapy Treatment    Rehabilitation Treatment Summary     Row Name 20 1316             Treatment Time/Intention    Discipline  physical therapy assistant  -RM      Document Type  therapy note (daily note)  -RM      Subjective Information  no complaints  -RM      Mode of Treatment  physical therapy  -RM      Care Plan Review  care plan/treatment goals reviewed  -RM      Care Plan Review, Other Participant(s)  daughter  -RM      Patient Effort  good  -RM      Existing Precautions/Restrictions  fall  -RM      Recorded by [RM] Aman Tipton, PTA 20 1546      Row Name 20 1316             Safety Issues, Functional Mobility    Safety Issues Affecting Function (Mobility)  positioning of assistive device;sequencing abilities;safety precaution awareness  -RM      Impairments Affecting Function (Mobility)  sensation/sensory awareness;endurance/activity tolerance;motor control;strength  -RM      Recorded by [RM] Aman Tipton, PTA 20 1546      Row Name 20 1316             Bed Mobility Assessment/Treatment    Supine-Sit [place] (Bed Mobility)  conditional independence  -RM      Assistive Device (Bed Mobility)  bed rails;head of bed elevated  -RM      Recorded by [RM] Aman Tipton, PTA 20 1546      Row Name 20 1316             Transfer  Assessment/Treatment    Transfer Assessment/Treatment  stand-sit transfer  -RM      Recorded by [] Aman Tipton, [facility] 08/04/20 1546      Row Name 08/04/20 1316             Sit-Stand Transfer    Sit-Stand [place] (Transfers)  contact guard;verbal cues  -RM      Assistive Device (Sit-Stand Transfers)  walker, front-wheeled  -RM      Recorded by [] Aman Tipton, [facility] 08/04/20 1546      Row Name 08/04/20 1316             Stand-Sit Transfer    Stand-Sit [place] (Transfers)  contact guard;minimum assist (75% patient effort);verbal cues  -RM      Assistive Device (Stand-Sit Transfers)  walker, front-wheeled  -RM      Recorded by [] Aman Tipton, [facility] 08/04/20 1546      Row Name 08/04/20 1316             Gait/Stairs Assessment/Training    [place] Level (Gait)  contact guard;minimum assist (75% patient effort);verbal cues  -RM      Assistive Device (Gait)  walker, front-wheeled  -RM      Distance in Feet (Gait)  14  -RM      Pattern (Gait)  step-to;step-through  -RM      Deviations/Abnormal Patterns (Gait)  left sided deviations;stride length decreased;gait speed decreased;base of support, wide  -RM      Left Sided Gait Deviations  weight shift ability decreased;knee hyperextension;heel strike decreased  -RM      Recorded by [] Aman Tipton, [facility] 08/04/20 1546      Row Name 08/04/20 1316             Motor Skills Assessment/Interventions    Additional Documentation  Therapeutic Exercise (Group);Neuromuscular Re-education (Group)  -RM      Recorded by [] Aman Tipton, [facility] 08/04/20 1546      Row Name 08/04/20 1316             Therapeutic Exercise    Lower Extremity (Therapeutic Exercise)  gluteal sets;gastroc stretch, bilateral;LAQ (long arc quad), bilateral;marching while seated  -RM      Recorded by [] Aman Tipton, [facility] 08/04/20 1546      Row Name 08/04/20 1316             Neuromuscular Re-education    Comment, Neuromuscular Re-education  Alphabet with B LE and B ankle x1    -RM      Recorded by [] Aman Tipton, PTA 08/04/20 1546      Row Name 08/04/20 1316             Positioning and Restraints    Pre-Treatment Position  in bed  -RM      Post Treatment Position  chair  -RM      In Chair  sitting;call light within reach;encouraged to call for assist;notified nsg  -RM      Recorded by [] Aman Tipton, PTA 08/04/20 1546      Row Name 08/04/20 1316             Pain Scale: Numbers Pre/Post-Treatment    Pain Scale: Numbers, Pretreatment  0/10 - no pain  -RM      Pain Scale: Numbers, Post-Treatment  0/10 - no pain  -RM      Recorded by [] Aman Tipton, PTA 08/04/20 1546      Row Name 08/04/20 1316             Plan of Care Review    Plan of Care Reviewed With  patient;daughter  -RM      Progress  improving  -      Outcome Summary  Pt tolerates treatment well.  Pt performed B LE activities to improve strength as well as control with activity. Pt progressed gait distance to 14' cga/min a with rw. Pt L kne did hyperextend only one time during treatment. See flowsheet for details.    -RM      Recorded by [] Aman Tipton, PTA 08/04/20 1546      Row Name 08/04/20 1316             Outcome Summary/Treatment Plan (PT)    Daily Summary of Progress (PT)  progress toward functional goals is good  -      Plan for Continued Treatment (PT)  Cont PT per POC.   -RM      Recorded by [] Aman Tipton, [facility] 08/04/20 1546        User Key  (r) = Recorded By, (t) = Taken By, (c) = Cosigned By    Initials Name Effective Dates Discipline     Aman Tipton, [facility] 03/07/18 -  PT                   Physical Therapy Education                 Title: PT OT SLP Therapies (In Progress)     Topic: Physical Therapy (In Progress)     Point: Mobility training (Done)     Description:   Instruct learner(s) on safety and technique for assisting patient out of bed, chair or wheelchair.  Instruct in the proper use of assistive devices, such as walker, crutches, cane or brace.               Patient Friendly Description:   It's important to get you on your feet again, but we need to do so in a way that is safe for you. Falling has serious consequences, and your personal safety is the most important thing of all.        When it's time to get out of bed, one of us or a family member will sit next to you on the bed to give you support.     If your doctor or nurse tells you to use a walker, crutches, a cane, or a brace, be sure you use it every time you get out of bed, even if you think you don't need it.    Learning Progress Summary           Patient Acceptance, E,TB,D, VU,NR by  at 8/4/2020 1547    Comment:  safety and pacing during gait    Acceptance, E,TB, VU by  at 8/3/2020 1558    Comment:  Role of PT while hospitalized and upon discharge to inpatient rehab                   Point: Home exercise program (Done)     Description:   Instruct learner(s) on appropriate technique for monitoring, assisting and/or progressing patient with therapeutic exercises and activities.              Learning Progress Summary           Patient Acceptance, E,TB,D, VU,NR by  at 8/4/2020 1547    Comment:  safety and pacing during gait                   Point: Body mechanics (Not Started)     Description:   Instruct learner(s) on proper positioning and spine alignment for patient and/or caregiver during mobility tasks and/or exercises.              Learner Progress:   Not documented in this visit.          Point: Precautions (Not Started)     Description:   Instruct learner(s) on prescribed precautions during mobility and gait tasks              Learner Progress:   Not documented in this visit.                      User Key     Initials Effective Dates Name Provider Type Discipline     04/03/18 -  Billie Lawson, PT Physical Therapist PT     03/07/18 -  Aman Tipton, PTA Physical Therapy Assistant PT                PT Recommendation and Plan     Outcome Summary/Treatment Plan (PT)  Daily Summary of Progress (PT):  progress toward functional goals is good  Plan for Continued Treatment (PT): Cont PT per POC.   Plan of Care Reviewed With: patient, daughter  Progress: improving  Outcome Summary: Pt tolerates treatment well.  Pt performed B LE activities to improve strength as well as control with activity. Pt progressed gait distance to 14' cga/min a with rw. Pt L barrington did hyperextend only one time during treatment. See flowsheet for details.    Outcome Measures     Row Name 08/04/20 1316 08/03/20 1244          How much help from another person do you currently need...    Turning from your back to your side while in flat bed without using bedrails?  4  -RM  --     Moving from lying on back to sitting on the side of a flat bed without bedrails?  4  -RM  --     Moving to and from a bed to a chair (including a wheelchair)?  3  -RM  --     Standing up from a chair using your arms (e.g., wheelchair, bedside chair)?  3  -RM  --     Climbing 3-5 steps with a railing?  2  -RM  --     To walk in hospital room?  3  -RM  --     AM-PAC 6 Clicks Score (PT)  19  -RM  --        How much help from another is currently needed...    Putting on and taking off regular lower body clothing?  --  3  -AH     Bathing (including washing, rinsing, and drying)  --  3  -AH     Toileting (which includes using toilet bed pan or urinal)  --  3  -AH     Putting on and taking off regular upper body clothing  --  3  -AH     Taking care of personal grooming (such as brushing teeth)  --  3  -AH     Eating meals  --  3  -AH     AM-PAC 6 Clicks Score (OT)  --  18  -AH        Functional Assessment    Outcome Measure Options  AM-PAC 6 Clicks Basic Mobility (PT)  -RM  AM-PAC 6 Clicks Daily Activity (OT)  -       User Key  (r) = Recorded By, (t) = Taken By, (c) = Cosigned By    Initials Name Provider Type    Roseline Ramírez Occupational Therapist    Aman Bobby, PTA Physical Therapy Assistant         Time Calculation:   PT Charges     Row Name 08/04/20 5299              Time Calculation    Start Time  1316  -RM      Stop Time  1357  -RM      Time Calculation (min)  41 min  -RM      PT Received On  08/04/20  -RM      PT Goal Re-Cert Due Date  08/13/20  -RM         Time Calculation- PT    Total Timed Code Minutes- PT  41 minute(s)  -RM         Timed Charges    39723 - PT Therapeutic Exercise Minutes  10  -RM      27798 -  PT Neuromuscular Reeducation Minutes  15  -RM      20384 - Gait Training Minutes   16  -RM        User Key  (r) = Recorded By, (t) = Taken By, (c) = Cosigned By    Initials Name Provider Type    Aman Bobby, PTA Physical Therapy Assistant        Therapy Charges for Today     Code Description Service Date Service Provider Modifiers Qty    23943514440 HC PT NEUROMUSC RE EDUCATION EA 15 MIN 8/4/2020 Aman Tipton, PTA GP 1    31163238435 HC PT THER PROC EA 15 MIN 8/4/2020 Aman Tipton, PTA GP 1    82271130148 HC GAIT TRAINING EA 15 MIN 8/4/2020 Aman Tipton, PTA GP 1          PT G-Codes  Outcome Measure Options: AM-PAC 6 Clicks Basic Mobility (PT)  AM-PAC 6 Clicks Score (PT): 19  AM-PAC 6 Clicks Score (OT): 18    Aman Tipton PTA  8/4/2020

## 2020-08-04 NOTE — CONSULTS
"Diabetes Education  Assessment/Teaching    Patient Name:  Crystal Ragsdale  YOB: 1950  MRN: [id number]  Admit Date:  8/3/2020      Assessment Date:  8/4/2020    Most Recent Value   General Information    Referral From:  Other (comment) [Pt has been informed that some of her blood glucose readings are a little above normal. Reviewed diagnostic criteria for pre-diabetes. Given ADA's pre-diabetes hndt and contents reviewed. Given One Touch Verio Flex glucometer & taught to use. ]   Height  157.5 cm (62\")   Height Method  Stated   Weight  103 kg (227 lb 4.7 oz)   Weight Method  Bed scale   Pregnancy Assessment   Diabetes History   What type of diabetes do you have?  Pre-diabetes [Noted that some previous readings were 5.8 and 5.9. Current is 6.3. Basic physiology of prediabetes & insulin resistance explained. States does drink a regular soda daily at breakfast & sweetens some of her tea. Reviewed carb ct of these drinks.]   Education Preferences   What areas of diabetes would you like to learn about?  -- [Given BHR DM, ADA's DKA hndts & contents reviewed along with information below. Given & taught to use complimentary glucometer-written instructions included with glucometer.]   Barriers to Learning  -- [Pt alert, oriented, appropriate, sitting up in chair. Receptive to diabetes ed.]   Nutrition Information   Assessment Topics   DM Goals            Most Recent Value   DM Education Needs   Meter  -- [Given complimentary One Touch Verio Flex glucometer & taught to use. Able to test own blood glucose with minimal assistance (=106 at 4:05 PM). Informed that prescription needed for insurance to consider coverage for additional strips/lancets.]   Meter Type  -- [Also informed of availability of storebrand glucometers such as [employer]'s ReliOn that can be purchased outright along with strips/lancets. Discussed testing once daily for now, varying times & logging results on logs given to share with PCP.]   Frequency " of Testing  -- [Also discussed testing PRN s/s hypo/hyperglycemia,  more often when feeling ill,  some 2 hour post-meal results.]   Blood Glucose Target  -- [Given info re: current ADA target goals for diabetes with advice to discuss with PCP to find out what her individualized pre-diabetes target goals should be. Reviewed how A1C test works & her current result (6.3).]   Medication  -- [Not currently taking medication to assist with blood glucose control. Briefly discussed taking medications in general as directed & keeping healthcare providers up to date on how they are working for her.]   Problem Solving  Hypoglycemia, Hyperglycemia, Signs, Symptoms, Treatment [Given information re: above. Denies s/s hypoglycemia.]   Healthy Eating  -- [Healthy eating via My Plate, watching portion sizes,choosing less processed & more nutrient dense foods reviewed.Discussed benefits of cutting back or eliminating sugar sweetened beverages.Healthy lifestyle changes as per ADA's prediabetes hndt discussed]   Physical Activity  -- [Benefitss of physical activity briefly reviewed. Advised to discuss with physician what activity best for her & how best to go about it. ]   Healthy Coping  -- [Informed of availability of diabetes classes/nutrition counseling at [facility].]   Motivation  Engaged   Teaching Method  Explanation, Discussion, Demonstration, Handouts, Teach back   Patient Response  Verbalized understanding, Demonstrates adequately            Other Comments:  Diabetes education done as above. Informed she could contact me if questions.        Electronically signed by:  Marysol Ferguson RN  08/04/20 16:44

## 2020-08-04 NOTE — THERAPY EVALUATION
Acute Care - Speech Language Pathology   Swallow Initial Evaluation  [facility]     Patient Name: Crystal NATHAN Jn  : 1950  MRN: [id number]  Today's Date: 2020  Onset of Illness/Injury or Date of Surgery: 20     Referring Physician: Dr. Kruse      Admit Date: 8/3/2020    Visit Dx:     ICD-10-CM ICD-9-CM   1. Hypertensive urgency I16.0 401.9     Patient Active Problem List   Diagnosis   • Essential hypertension   • Anxiety associated with depression   • Hyperglycemia   • Encounter for Medicare annual wellness exam   • Tobacco abuse counseling   • Chronic pain of left ankle   • Colon cancer screening   • Hypertensive urgency     Past Medical History:   Diagnosis Date   • Cancer (CMS/HCC)     Breast Cancer   • Impaired functional mobility, balance, gait, and endurance      Past Surgical History:   Procedure Laterality Date   • BREAST LUMPECTOMY Right    • HYSTERECTOMY          SWALLOW EVALUATION (last 72 hours)      SLP Adult Swallow Evaluation     Row Name 20 1025                   Rehab Evaluation    Document Type  evaluation  -TM        Subjective Information  no complaints  -TM        Patient Observations  alert;cooperative  -TM        Patient/Family Observations  no family present  -TM        Patient Effort  good  -TM        Symptoms Noted During/After Treatment  none  -TM           General Information    Patient Profile Reviewed  yes  -TM        Pertinent History Of Current Problem  CVA  -TM        Current Method of Nutrition  regular textures;thin liquids  -TM        Precautions/Limitations, Vision  WFL  -TM        Precautions/Limitations, Hearing  WFL  -TM        Prior Level of Function-Communication  WFL  -TM        Prior Level of Function-Swallowing  no diet consistency restrictions  -TM        Plans/Goals Discussed with  patient;other (see comments) RN  -TM        Barriers to Rehab  none identified  -TM        Patient's Goals for Discharge  patient did not state  -TM            Pain Assessment    Additional Documentation  Pain Scale: Numbers Pre/Post-Treatment (Group)  -TM           Pain Scale: Numbers Pre/Post-Treatment    Pain Scale: Numbers, Pretreatment  0/10 - no pain  -TM        Pain Scale: Numbers, Post-Treatment  0/10 - no pain  -TM           Oral Motor and Function    Oral Lesions or Structural Abnormalities and/or variants  none identified  -TM        Dentition Assessment  natural, present and adequate  -TM        Secretion Management  WNL/WFL  -TM        Volitional Cough  WFL  -TM           Oral Musculature and Cranial Nerve Assessment    Oral Motor General Assessment  WFL  -TM           General Eating/Swallowing Observations    Respiratory Support Currently in Use  room air  -TM        Eating/Swallowing Skills  fed by SLP  -TM        Positioning During Eating  upright in bed  -TM        Utensils Used  spoon;straw  -TM        Consistencies Trialed  regular textures;pureed;thin liquids  -TM           Respiratory    Respiratory Status  WFL;room air;during swallowing/eating  -TM           Clinical Swallow Eval    Oral Prep Phase  WFL  -TM        Oral Transit  WFL  -TM        Oral Residue  WFL  -TM        Pharyngeal Phase  no overt signs/symptoms of pharyngeal impairment  -TM        Esophageal Phase  -- pt. stated that she belches and has refluxate at times   -        Clinical Swallow Evaluation Summary  Bedside eval of swallow completed.  Pt. was given trials of regular, puree, and thin liquid.  Oral phase was WFL with all consistencies.  No overt s/s aspiration or other pharyngeal phase dysphagia with any trial.  Pt. reported that she has difficulty with heartburn/refluxate at times with some foods.  No skilled services needed at this time.  Recommend:  1. continue reg diet with thin liq as nancy,  2. meds whole with thin liq as nancy,  3. aspiration precautions,  4. reflux precautions.  No skilled services needed at this time.    -TM           Clinical Impression    SLP Swallowing  Diagnosis  functional oral phase;functional pharyngeal phase  -        Functional Impact  no impact on function  -TM        Swallow Criteria for Skilled Therapeutic Interventions Met  no problems identified which require skilled intervention  -TM           Recommendations    Therapy Frequency (Swallow)  evaluation only  -        SLP Diet Recommendation  regular textures;thin liquids  -        Recommended Precautions and Strategies  upright posture during/after eating  -        SLP Rec. for Method of Medication Administration  meds whole;with thin liquids;with pudding or applesauce;as tolerated  -        Monitor for Signs of Aspiration  yes;notify SLP if any concerns;gurgly voice  -        Anticipated Dischage Disposition (SLP)  unknown  -          User Key  (r) = Recorded By, (t) = Taken By, (c) = Cosigned By    Initials Name Effective Dates     Jeannette Morris 03/07/18 -           EDUCATION  The patient has been educated in the following areas:   Dysphagia (Swallowing Impairment).    SLP Recommendation and Plan  SLP Swallowing Diagnosis: functional oral phase, functional pharyngeal phase  SLP Diet Recommendation: regular textures, thin liquids  Recommended Precautions and Strategies: upright posture during/after eating  SLP Rec. for Method of Medication Administration: meds whole, with thin liquids, with pudding or applesauce, as tolerated     Monitor for Signs of Aspiration: yes, notify SLP if any concerns, gurgly voice     Swallow Criteria for Skilled Therapeutic Interventions Met: no problems identified which require skilled intervention  Anticipated Dischage Disposition (SLP): unknown     Therapy Frequency (Swallow): evaluation only          Plan of Care Reviewed With: patient  Progress: improving  Outcome Summary: Bedside eval of swallow completed.  Pt. was given trials of regular, puree, and thin liquid.  Oral phase was WFL with all consistencies.  No overt s/s aspiration or other pharyngeal  phase dysphagia with any trial.  Pt. reported that she has difficulty with heartburn/refluxate at times with some foods.  No skilled services needed at this time.  Recommend:  1. continue reg diet with thin liq as nancy,  2. meds whole with thin liq as nancy,  3. aspiration precautions,  4. reflux precautions.  No skilled services needed at this time.           Time Calculation:   Time Calculation- SLP     Row Name 08/04/20 1054             Time Calculation- SLP    SLP Start Time  1025  -TM      SLP Stop Time  1033  -TM      SLP Time Calculation (min)  8 min  -TM      SLP Received On  08/04/20  -        User Key  (r) = Recorded By, (t) = Taken By, (c) = Cosigned By    Initials Name Provider Type    TM Jeannette Morris Speech and Language Pathologist          Therapy Charges for Today     Code Description Service Date Service Provider Modifiers Qty    [id number]  ST EVAL ORAL PHARYNG SWALLOW 4 8/4/2020 Jeannette Morris GN 1               Jeannette Morris  8/4/2020

## 2020-08-04 NOTE — PLAN OF CARE
Problem: Patient Care Overview  Goal: Plan of Care Review  Outcome: Ongoing (interventions implemented as appropriate)  Flowsheets (Taken 8/4/2020 1422)  Progress: improving  Plan of Care Reviewed With: patient;daughter  Outcome Summary: Pt noted with improved LUE strength today.  Pt able to complete ex as per flow sheet using red theraband for resistance.  Pt noted with decreased coordination, but was educated to slow down and really control LUE with movements.  Pt able to improve coordination by decreasing her speed.  Pt is motivated to get stronger and return home.

## 2020-08-05 LAB — SARS-COV-2 RNA PNL SPEC NAA+PROBE: NOT DETECTED

## 2020-08-05 PROCEDURE — 97530 THERAPEUTIC ACTIVITIES: CPT

## 2020-08-05 PROCEDURE — 97110 THERAPEUTIC EXERCISES: CPT

## 2020-08-05 PROCEDURE — G0378 HOSPITAL OBSERVATION PER HR: HCPCS

## 2020-08-05 PROCEDURE — 97112 NEUROMUSCULAR REEDUCATION: CPT

## 2020-08-05 PROCEDURE — 97116 GAIT TRAINING THERAPY: CPT

## 2020-08-05 PROCEDURE — 87635 SARS-COV-2 COVID-19 AMP PRB: CPT | Performed by: INTERNAL MEDICINE

## 2020-08-05 PROCEDURE — 99225 PR SBSQ OBSERVATION CARE/DAY 25 MINUTES: CPT | Performed by: NURSE PRACTITIONER

## 2020-08-05 PROCEDURE — 25010000002 ENOXAPARIN PER 10 MG: Performed by: INTERNAL MEDICINE

## 2020-08-05 PROCEDURE — 96372 THER/PROPH/DIAG INJ SC/IM: CPT

## 2020-08-05 RX ADMIN — AMLODIPINE BESYLATE 5 MG: 5 TABLET ORAL at 09:18

## 2020-08-05 RX ADMIN — HYDROXYZINE HYDROCHLORIDE 25 MG: 25 TABLET, FILM COATED ORAL at 09:19

## 2020-08-05 RX ADMIN — SODIUM CHLORIDE, PRESERVATIVE FREE 10 ML: 5 INJECTION INTRAVENOUS at 20:31

## 2020-08-05 RX ADMIN — NICOTINE 1 PATCH: 21 PATCH TRANSDERMAL at 09:19

## 2020-08-05 RX ADMIN — ASPIRIN 81 MG 81 MG: 81 TABLET ORAL at 09:19

## 2020-08-05 RX ADMIN — CALCIUM CARBONATE 1 TABLET: 500 TABLET, CHEWABLE ORAL at 20:30

## 2020-08-05 RX ADMIN — HYDROXYZINE HYDROCHLORIDE 25 MG: 25 TABLET, FILM COATED ORAL at 20:31

## 2020-08-05 RX ADMIN — ATORVASTATIN CALCIUM 40 MG: 40 TABLET, FILM COATED ORAL at 20:31

## 2020-08-05 RX ADMIN — ENOXAPARIN SODIUM 60 MG: 60 INJECTION SUBCUTANEOUS at 05:26

## 2020-08-05 RX ADMIN — MELATONIN TAB 5 MG 5 MG: 5 TAB at 20:31

## 2020-08-05 RX ADMIN — CARVEDILOL 25 MG: 25 TABLET, FILM COATED ORAL at 18:02

## 2020-08-05 RX ADMIN — CARVEDILOL 25 MG: 25 TABLET, FILM COATED ORAL at 09:18

## 2020-08-05 RX ADMIN — LOSARTAN POTASSIUM 50 MG: 50 TABLET, FILM COATED ORAL at 09:18

## 2020-08-05 RX ADMIN — SODIUM CHLORIDE, PRESERVATIVE FREE 10 ML: 5 INJECTION INTRAVENOUS at 09:19

## 2020-08-05 NOTE — PLAN OF CARE
"  Problem: Patient Care Overview  Goal: Plan of Care Review  Outcome: Ongoing (interventions implemented as appropriate)  Flowsheets (Taken 8/5/2020 5988)  Progress: improving  Plan of Care Reviewed With: patient  Outcome Summary: Pt seen skilled OT focusing on UB strengthening, coordination and fine motor coordination.  Pt noted with decreased fine motor coordination L hand when manipulating coins.  Worked on picking up coins, transitioning to her palm and back to finger tips.  Pt able to  10 coins without dropping, but was not able to hold coins in her palm to transition to her finger tips without dropping coins.  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     "

## 2020-08-05 NOTE — PLAN OF CARE
Problem: Patient Care Overview  Goal: Plan of Care Review  Flowsheets  Taken 8/5/2020 0336  Progress: improving  Taken 8/4/2020 2000  Plan of Care Reviewed With: patient  VSS. Patient awaiting placement. Resting in bed. Will continue to monitor.   Problem: Fall Risk (Adult)  Goal: Identify Related Risk Factors and Signs and Symptoms  Flowsheets (Taken 8/5/2020 0336)  Signs and Symptoms (Fall Risk): presence of risk factors  Goal: Absence of Fall  Flowsheets (Taken 8/5/2020 0336)  Absence of Fall: making progress toward outcome  Intervention: Monitor/Assist with Self Care  Flowsheets  Taken 8/5/2020 0300 by Faiza Polk, PCT  Activity Assistance Provided: assistance, 2 people  Taken 8/5/2020 0336 by Marci Hodge RN  Self-Care Promotion: independence encouraged  Intervention: Reduce Risk/Promote Restraint Free Environment  Flowsheets (Taken 8/5/2020 0300 by Faiza Polk, PCT)  Safety Promotion/Fall Prevention: activity supervised;fall prevention program maintained;safety round/check completed;nonskid shoes/slippers when out of bed  Environmental Safety Modification: assistive device/personal items within reach  Safety/Security Measures: bed alarm set  Intervention: Review Medications/Identify Contributors to Fall Risk  Flowsheets (Taken 8/4/2020 1600 by Liliam Khan RN)  Medication Review/Management: medications reviewed     Problem: Anxiety (Adult)  Goal: Reduction/Resolution  Flowsheets (Taken 8/5/2020 0336)  Reduction/Resolution: making progress toward outcome  Intervention: Promote Anxiety Reduction and Resolution  Flowsheets (Taken 8/4/2020 2000)  Supportive Measures: active listening utilized  Sensory Stimulation Regulation: care clustered;lighting decreased;music/television provided for relaxation  Environmental Support: calm environment promoted  Family/Support System Care: support provided

## 2020-08-05 NOTE — PROGRESS NOTES
Continued Stay Note  [facility]     Patient Name: Crystal NATHAN Jn  MRN: [id number]  Today's Date: 8/5/2020    Admit Date: 8/3/2020    Discharge Plan     Row Name 08/05/20 0733       Plan    Plan Belinda spoke with Dr Carbajal yesterday at [facility] and unable to accept pt.  Spoke with Nena at [facility] and next possible Skilled bed availability is now Sunday.  Will discuss with pt about other options.      08:23  Spoke to pt in room and discussed [facility] as [facility].  Wants to think about it and discuss with her daughter.    09:20  Received call from daughter Yanet and wanted to know if had Swing Bed Unit in [place] area, would be closer for them then [place].  Christophe was able to find one at [facility].  Called and spoke with Jose Luis, states they do have waiting list but will look at referral.  Faxed to .     10:30  Received call back from Jose Luis at [facility] in [place] and cannot accept, has no beds.  Called back to [facility] and sourav left for Deidra to look at referral.    13:32   Received call from Deidra ORDAZ and is starting precert, [facility] has already obtained precert and get it transferred.  [facility] can accept tomorrow after neg Covid.  Belinda will order rapid test. Pt informed and sister can transport.     Call report to  and Fax DCS to          Discharge Codes    No documentation.       Expected Discharge Date and Time     Expected Discharge Date Expected Discharge Time    Aug 5, 2020             Crystal Parks RN

## 2020-08-05 NOTE — PROGRESS NOTES
PROGRESS NOTE        Date of Admission: 8/3/2020  Length of Stay: 0  Primary Care Physician: Vermeesch, Marilyn K, MD    Subjective   Chief Complaint:Follow up uncontrolled hypertension and CVA   HPI: This is a 69-year-old female with a history of hypertension, anxiety, chronic tobacco abuse who presented to the emergency room with complaints of onset of left-sided numbness and weakness.  She been in her normal state of health earlier in the day when she took a nap and upon awakening she felt a heaviness in her left lower extremity.  She denied any visual changes, chest pain, slurred speech.  Upon evaluation in the emergency room her systolic blood pressure was 245.  Her blood pressure was noted to be 247/123.  She was given antihypertensive in the emergency room.  CT of the head was negative for acute process.  Given her symptoms and blood pressure she was admitted to the hospitalist service for further medical management.  Upon admission patient was treated for hypertensive urgency.  2D echo as well as a carotid duplex were ordered.  An MRI of the head was also done which did show findings consistent with acute infarcts involving both frontal lobes.  I did speak with Dr. Pinto with neurology on-call at [facility] in [place] regarding these findings who stated that there was no intervention at this point that can be done as patient is well outside the window for intervention.  She did recommend given that it is in the bilateral frontal lobes to look for an arrhythmia or cardiac cause.  Dr. Sheets with cardiology has been consulted. There has been no evidence of arrhythmia on tele.  Her 2D echo did not show any valvular abnormalities.     The patient does drive and mow her own yard.  I have advised that she will need to follow-up with neurology and cannot drive or operate any equipment until she is cleared by neurologist.  PT OT has been consulted for strength and mobility.  She denies any difficulty swallowing.   Speech therapy was consulted and did bedside evaluation.  She has recommended regular diet with then liquids.  No evidence of aspiration.  .  She has been started on aspirin.  We will continue on Lovenox for DVT prophylaxis.  She has been advised to discontinue smoking.  She does have a borderline hemoglobin A1c at 6.3 for which diabetes consult has been requested.     After a long discussion with the patient, she does not feel like she can go home at this point due to weakness.  Case management has been consulted and working on swing bed placement.  Patient is doing much better today and appears to be in better spirits.  She is gaining more strength in her left side working with therapy.  She denies chest pain, SOA, nausea, vomiting.      Review Of Systems:   Review of Systems   General ROS: Patient denies any fevers, chills or loss of consciousness.    Psychological ROS: Anxiety  Ophthalmic ROS: Denies any diplopia or transient loss of vision.  ENT ROS: Denies sore throat, nasal congestion or ear pain.   Hematological and Lymphatic ROS: Denies neck swelling or easy bleeding.  Endocrine ROS: Denies any recent unintentional weight gain or loss.  Respiratory ROS: Denies cough or shortness of breath.   Cardiovascular ROS: Denies chest pain or palpitations. No history of exertional chest pain.   Gastrointestinal ROS: Denies nausea and vomiting. Denies any abdominal pain. No diarrhea.  Genito-Urinary ROS: Denies dysuria or hematuria.  Musculoskeletal ROS: Denies back pain. No muscle pain. No calf pain.   Neurological ROS: Left side weakness is improving and she is feeling stronger.    Dermatological ROS: Denies any redness or pruritis.    Objective      Temp:  [97.9 °F (36.6 °C)-98.3 °F (36.8 °C)] 98.3 °F (36.8 °C)  Heart Rate:  [59-73] 64  Resp:  [17-18] 17  BP: (138-173)/(52-86) 138/69  Physical Exam    General Appearance:  Alert and cooperative, not in any acute distress.   Head:  Atraumatic and normocephalic,  without obvious abnormality.   Eyes:          PERRLA, conjunctivae and sclerae normal, no Icterus. No pallor. Extraocular movements are within normal limits.   Ears:  Ears appear intact with no abnormalities noted.   Throat: No oral lesions, no thrush, oral mucosa moist.   Neck: Supple, trachea midline, no thyromegaly, no carotid bruit.       Lungs:   Chest shape is normal. Breath sounds heard bilaterally equally.  No crackles or wheezing. No Pleural rub or bronchial breathing.   Heart:  Normal S1 and S2, no murmur, no gallop, no rub. No JVD   Abdomen:   Normal bowel sounds, no masses, no organomegaly. Soft    non-tender, non-distended, no guarding, no rebound             tenderness   Extremities: Moves all extremities well, no edema, no cyanosis, no clubbing.   Pulses: Pulses palpable and equal bilaterally   Skin: No bleeding, bruising or rash       Neurologic:          Psychiatric/Behavior:      Alert and oriented x3.  No facial drooping.  She does have decreased strength in her left lower extremity compared to the right 4 out of 5 left lower extremity 4 out of 5 left upper extremity.  Sensation is intact.  Her strength has much improved.  Smiling and laughing.           Results Review:    I have reviewed the labs, radiology results and diagnostic studies.    Results from last 7 days   Lab Units 08/03/20  0615   WBC 10*3/mm3 10.35   HEMOGLOBIN g/dL 16.3*   PLATELETS 10*3/mm3 258     Results from last 7 days   Lab Units 08/03/20  0615   SODIUM mmol/L 140   POTASSIUM mmol/L 3.4*   CO2 mmol/L 23.6   CREATININE mg/dL 0.66   GLUCOSE mg/dL 165*       Culture Data: No results found for: BLOODCX, URINECX, WOUNDCX, MRSACX, RESPCX, STOOLCX  Radiology Data:   Cardiology Data:    I have reviewed the medications.    Assessment/Plan     Assessment/Plan:  1.  Acute bilateral frontal lobe infarcts-patient has been started on aspirin.  I did speak with neurology on-call Dr. Tom at [facility] in [place] who recommend  evaluation for cardiac cause and no intervention since patient was out of window.   She is on tele and there are no evidence of arrhythmia.  Cardiology consulted and did evaluate.  2 D echo ordered and showed EF 65-70% and no significant valvular abnormalities.   Cardotid duplex normal.  Counseled on smoking cessation. Will have her follow up with Cardiology as an outpatient where she will need holter monitor.  PT/OT consult and case management for rehab placement.  She has been started on aspirin and statin therapyl   2.  Hypertensive urgency-  BP improving.  Clonidine has been discontinued.  Started on norvasc.  Her BP is doing much better.  Will continue to monitor.  3.  Anxiety  4.  Tobacco abuse- Counseling  5.  History of breast cancer  6.  Borderline diabetes mellitus-  Diabetes educator to consult.    Plan to discharge to swing bed tomorrow for rehab.          DVT prophylaxis:  Lovenox  Discharge Planning:   [facility] 08/05/20 13:13

## 2020-08-05 NOTE — THERAPY TREATMENT NOTE
Acute Care - Occupational Therapy Treatment Note   Luis Fernando     Patient Name: Crystal Ragsdale  : 1950  MRN: [id number]  Today's Date: 2020  Onset of Illness/Injury or Date of Surgery: 20  Date of Referral to OT: 20  Referring Physician: Dr. Kruse    Admit Date: 8/3/2020       ICD-10-CM ICD-9-CM   1. Hypertensive urgency I16.0 401.9     Patient Active Problem List   Diagnosis   • Essential hypertension   • Anxiety associated with depression   • Hyperglycemia   • Encounter for Medicare annual wellness exam   • Tobacco abuse counseling   • Chronic pain of left ankle   • Colon cancer screening   • Hypertensive urgency     Past Medical History:   Diagnosis Date   • Cancer (CMS/HCC)     Breast Cancer   • Impaired functional mobility, balance, gait, and endurance      Past Surgical History:   Procedure Laterality Date   • BREAST LUMPECTOMY Right    • HYSTERECTOMY  1994       Therapy Treatment    Rehabilitation Treatment Summary     Row Name 20 0948 20 0931          Treatment Time/Intention    Discipline  occupational therapist  -  physical therapist  -LM     Document Type  therapy note (daily note)  -  therapy note (daily note)  -     Subjective Information  no complaints  -  no complaints  -LM     Mode of Treatment  occupational therapy  -  physical therapy  -LM     Patient/Family Observations  Pt received supine in bed working with PT  -  Pt received supine in bed and alone in room. She voices concern about her D/C plan and her future.  -     Care Plan Review  care plan/treatment goals reviewed;patient/other agree to care plan  -  care plan/treatment goals reviewed;patient/other agree to care plan  -LM     Total Minutes, Physical Therapy Treatment  --  40  -LM     Therapy Frequency (PT Clinical Impression)  --  daily  -LM     Patient Effort  good  -  good  -LM     Existing Precautions/Restrictions  --  fall  -LM     Recorded by [] Roseline Monroy 20 2347  [LM] Harini Diana, PT 08/05/20 1020     Row Name 08/05/20 0931             Safety Issues, Functional Mobility    Safety Issues Affecting Function (Mobility)  safety precaution awareness;safety precautions follow-through/compliance  -      Impairments Affecting Function (Mobility)  coordination;endurance/activity tolerance;motor control;sensation/sensory awareness;strength  -LM      Recorded by [LM] Harini Diana, PT 08/05/20 1020      Row Name 08/05/20 0948 08/05/20 0931          Bed Mobility Assessment/Treatment    Bed Mobility Assessment/Treatment  supine-sit  -  supine-sit  -LM     Supine-Sit [place] (Bed Mobility)  conditional independence  -  conditional independence  -LM     Assistive Device (Bed Mobility)  head of bed elevated;bed rails  -  bed rails;head of bed elevated  -LM     Recorded by [] Roseline Monroy 08/05/20 1052 [LM] Harini Diana, PT 08/05/20 1020     Row Name 08/05/20 0948             Functional Mobility    Functional Mobility- Ind. Level  contact guard assist;verbal cues required  -      Functional Mobility- Device  rolling walker  -      Functional Mobility-Distance (Feet)  38  -      Functional Mobility- Comment  verbal cues needed for control of LLE  -      Recorded by [] Roseline Monroy 08/05/20 1052      Row Name 08/05/20 0948 08/05/20 0931          Transfer Assessment/Treatment    Transfer Assessment/Treatment  sit-stand transfer;stand-sit transfer  -  sit-stand transfer;stand-sit transfer  -LM     Recorded by [] Roseline Monroy 08/05/20 1052 [LM] Harnii Diana, PT 08/05/20 1020     Row Name 08/05/20 0948 08/05/20 0931          Sit-Stand Transfer    Sit-Stand [place] (Transfers)  contact guard  -  contact guard;verbal cues  -     Assistive Device (Sit-Stand Transfers)  walker, front-wheeled  -  walker, front-wheeled  -LM     Recorded by [] Roseline Monroy 08/05/20 1052 [LM] Harini Diana, PT 08/05/20 1020     Row Name 08/05/20 0948 08/05/20 0931           Stand-Sit Transfer    Stand-Sit [place] (Transfers)  contact guard  -  contact guard;verbal cues  -     Assistive Device (Stand-Sit Transfers)  walker, front-wheeled  -  walker, front-wheeled  -LM     Recorded by [] Roseline Monroy 08/05/20 1052 [] Harini Diana, PT 08/05/20 1020     Row Name 08/05/20 0931             Gait/Stairs Assessment/Training    Gait/Stairs Assessment/Training  gait/ambulation assistive device  -      [place] Level (Gait)  contact guard;verbal cues  -LM      Assistive Device (Gait)  walker, front-wheeled  -      Distance in Feet (Gait)  38'  -LM      Pattern (Gait)  step-through  -LM      Deviations/Abnormal Patterns (Gait)  left sided deviations;stride length decreased;gait speed decreased;base of support, wide  -LM      Bilateral Gait Deviations  foot drop/toe drag;heel strike decreased  -LM      Left Sided Gait Deviations  knee hyperextension;foot drop/toe drag;heel strike decreased  -      Recorded by [] Harini Diana, PT 08/05/20 1020      Row Name 08/05/20 0948             Grooming Assessment/Training    [place] Level (Grooming)  hair care, combing/brushing;independent  -      Recorded by [] Roseline Monroy 08/05/20 1052      Row Name 08/05/20 0948             Motor Skills Assessment/Interventions    Additional Documentation  Fine Motor Testing & Training (Group);Functional Endurance Training (Group)  -      Recorded by [] Roseline Monroy 08/05/20 1052      Row Name 08/05/20 0948 08/05/20 0931          Therapeutic Exercise    Upper Extremity Range of Motion (Therapeutic Exercise)  shoulder flexion/extension, bilateral;shoulder horizontal abduction/adduction, bilateral;elbow flexion/extension, bilateral  -  --     Lower Extremity (Therapeutic Exercise)  --  gluteal sets;heel slides, left;LAQ (long arc quad), left;marching while seated;marching while standing;quad sets, bilateral;SAQ (short arc quad), left;SLR (straight leg raise), left;other  (see comments) Bridging-L LE; sit to stand; mini Squats in stand with RW  -LM     Weight/Resistance (Therapeutic Exercise)  red  -AH  --     Exercise Type (Therapeutic Exercise)  resistive exercises working on control with ex  -AH  AROM (active range of motion)  -LM     Position (Therapeutic Exercise)  seated  -  seated;standing;supine  -LM     Sets/Reps (Therapeutic Exercise)  1 x 15  -AH  1 x 15  -LM     Equipment (Therapeutic Exercise)  resistive bands  -  --     Recorded by [] Roseline Monroy 08/05/20 1052 [LM] Harini Diana, PT 08/05/20 1020     Row Name 08/05/20 0931             Static Sitting Balance    Level of [place] (Unsupported Sitting, Static Balance)  supervision  -LM      Sitting Position (Unsupported Sitting, Static Balance)  sitting on edge of bed  -LM      Recorded by [] Harini Diana, PT 08/05/20 1020      Row Name 08/05/20 0931             Dynamic Sitting Balance    Level of [place], Reaches Outside Midline (Sitting, Dynamic Balance)  supervision  -LM      Sitting Position, Reaches Outside Midline (Sitting, Dynamic Balance)  sitting on edge of bed  -LM      Recorded by [] Harini Diana, PT 08/05/20 1020      Row Name 08/05/20 0931             Static Standing Balance    Level of [place] (Supported Standing, Static Balance)  contact guard assist  -LM      Assistive Device Utilized (Supported Standing, Static Balance)  walker, rolling  -LM      Recorded by [LM] Harini Diana, PT 08/05/20 1020      Row Name 08/05/20 0931             Dynamic Standing Balance    Level of [place], Reaches Outside Midline (Standing, Dynamic Balance)  contact guard assist  -LM      Assistive Device Utilized (Supported Standing, Dynamic Balance)  walker, rolling  -LM      Recorded by [] Harini Diana, PT 08/05/20 1020      Row Name 08/05/20 0948             Fine Motor Testing & Training    Training Activity, Fine Motor Coordination  manipulation of coins  -      Comment, Fine Motor  "Coordination  picking up coins, transitioning finger to palm and back to fingers  -      Recorded by [] Roseline Monroy 08/05/20 1052      Row Name 08/05/20 0948 08/05/20 0931          Positioning and Restraints    Pre-Treatment Position  in bed  -  in bed  -     Post Treatment Position  chair  -  chair  -LM     In Chair  sitting;call light within reach;encouraged to call for assist  -  sitting;call light within reach;encouraged to call for assist;with OT  -LM     Recorded by [] Roseline Monroy 08/05/20 1052 [LM] Harini Diana, PT 08/05/20 1020     Row Name 08/05/20 0948 08/05/20 0931          Pain Scale: Numbers Pre/Post-Treatment    Pain Scale: Numbers, Pretreatment  0/10 - no pain  -  0/10 - no pain  -LM     Pain Scale: Numbers, Post-Treatment  0/10 - no pain  -  0/10 - no pain  -LM     Recorded by [] Roseline Monroy 08/05/20 1052 [LM] Harini Diana, PT 08/05/20 1020     Row Name 08/05/20 0948             Coping    Observed Emotional State  accepting;cooperative  -      Verbalized Emotional State  acceptance  -      Recorded by [] Roseline Monroy 08/05/20 1052      Row Name 08/05/20 0948 08/05/20 0931          Plan of Care Review    Plan of Care Reviewed With  patient  -  --     Progress  improving  -  improving  -LM     Outcome Summary  Pt seen skilled OT focusing on UB strengthening, coordination and fine motor coordination.  Pt noted with decreased fine motor coordination L hand when manipulating coins.  Worked on picking up coins, transitioning to her palm and back to finger tips.  Pt able to  10 coins without dropping, but was not able to hold coins in her palm to transition to her finger tips without dropping coins.  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  -  --     Recorded by [] Roseline Monroy 08/05/20 1052 [LM] Harini Diana, PT 08/05/20 1020     Row Name 08/05/20 0948             Outcome Summary/Treatment " Plan (OT)    Daily Summary of Progress (OT)  progress toward functional goals is good  -      Anticipated Discharge Disposition (OT)  inpatient rehabilitation facility  -      Recorded by [] Roseline Monroy 08/05/20 1052      Row Name 08/05/20 0931             Outcome Summary/Treatment Plan (PT)    Daily Summary of Progress (PT)  progress toward functional goals as expected  -      Plan for Continued Treatment (PT)  Cont PT per POC to outlined goals.  -      Anticipated Discharge Disposition (PT)  inpatient rehabilitation facility  -      Recorded by [LM] Harini Diana, PT 08/05/20 1020        User Key  (r) = Recorded By, (t) = Taken By, (c) = Cosigned By    Initials Name Effective Dates Discipline     Roseline Monroy 03/07/18 -  OT     Harini Diana, PT 04/03/18 -  PT             Occupational Therapy Education                 Title: PT OT SLP Therapies (In Progress)     Topic: Occupational Therapy (In Progress)     Point: ADL training (Done)     Description:   Instruct learner(s) on proper safety adaptation and remediation techniques during self care or transfers.   Instruct in proper use of assistive devices.              Learning Progress Summary           Patient Acceptance, E,TB, VU by  at 8/5/2020 1053    Comment:  fine motor ex to improve coordination.    Acceptance, E,TB, VU by  at 8/3/2020 1421    Comment:  Role of OT/POC                   Point: Home exercise program (Done)     Description:   Instruct learner(s) on appropriate technique for monitoring, assisting and/or progressing therapeutic exercises/activities.              Learning Progress Summary           Patient Acceptance, E,TB, VU by  at 8/5/2020 1053    Comment:  fine motor ex to improve coordination.    Acceptance, E,TB, VU,DU by  at 8/4/2020 1619    Comment:  benefit of UB ex and control during ex.   Family Acceptance, E,TB, VU,DU by  at 8/4/2020 1619    Comment:  benefit of UB ex and control during ex.                 "   Point: Precautions (Not Started)     Description:   Instruct learner(s) on prescribed precautions during self-care and functional transfers.              Learner Progress:   Not documented in this visit.          Point: Body mechanics (Not Started)     Description:   Instruct learner(s) on proper positioning and spine alignment during self-care, functional mobility activities and/or exercises.              Learner Progress:   Not documented in this visit.                      User Key     Initials Effective Dates Name Provider Type Discipline     03/07/18 -  Roseline Monroy Occupational Therapist OT                OT Recommendation and Plan  Outcome Summary/Treatment Plan (OT)  Daily Summary of Progress (OT): progress toward functional goals is good  Anticipated Discharge Disposition (OT): inpatient rehabilitation facility  Therapy Frequency (OT Eval): 3 times/wk  Daily Summary of Progress (OT): progress toward functional goals is good  Plan of Care Review  Plan of Care Reviewed With: patient  Plan of Care Reviewed With: patient  Outcome Summary: Pt seen skilled OT focusing on UB strengthening, coordination and fine motor coordination.  Pt noted with decreased fine motor coordination L hand when manipulating coins.  Worked on picking up coins, transitioning to her palm and back to finger tips.  Pt able to  10 coins without dropping, but was not able to hold coins in her palm to transition to her finger tips without dropping coins.  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  Outcome Measures     Row Name 08/05/20 0948 08/05/20 0931 08/04/20 1422       How much help from another person do you currently need...    Turning from your back to your side while in flat bed without using bedrails?  --  4  -LM  --    Moving from lying on back to sitting on the side of a flat bed without bedrails?  --  4  -LM  --    Moving to and from a bed to a chair " (including a wheelchair)?  --  3  -LM  --    Standing up from a chair using your arms (e.g., wheelchair, bedside chair)?  --  3  -LM  --    Climbing 3-5 steps with a railing?  --  2  -LM  --    To walk in hospital room?  --  3  -LM  --    AM-PAC 6 Clicks Score (PT)  --  19  -LM  --       How much help from another is currently needed...    Putting on and taking off regular lower body clothing?  3  -AH  --  3  -AH    Bathing (including washing, rinsing, and drying)  3  -AH  --  3  -AH    Toileting (which includes using toilet bed pan or urinal)  3  -AH  --  3  -AH    Putting on and taking off regular upper body clothing  4  -AH  --  3  -AH    Taking care of personal grooming (such as brushing teeth)  4  -AH  --  4  -AH    Eating meals  4  -AH  --  4  -AH    AM-PAC 6 Clicks Score (OT)  21  -AH  --  20  -AH       Functional Assessment    Outcome Measure Options  AM-PAC 6 Clicks Daily Activity (OT)  -  AM-PAC 6 Clicks Basic Mobility (PT)  -LM  -PAC 6 Clicks Daily Activity (OT)  -    Row Name 08/04/20 1316 08/03/20 1244          How much help from another person do you currently need...    Turning from your back to your side while in flat bed without using bedrails?  4  -RM  --     Moving from lying on back to sitting on the side of a flat bed without bedrails?  4  -RM  --     Moving to and from a bed to a chair (including a wheelchair)?  3  -RM  --     Standing up from a chair using your arms (e.g., wheelchair, bedside chair)?  3  -RM  --     Climbing 3-5 steps with a railing?  2  -RM  --     To walk in hospital room?  3  -RM  --     AM-PAC 6 Clicks Score (PT)  19  -RM  --        How much help from another is currently needed...    Putting on and taking off regular lower body clothing?  --  3  -AH     Bathing (including washing, rinsing, and drying)  --  3  -AH     Toileting (which includes using toilet bed pan or urinal)  --  3  -AH     Putting on and taking off regular upper body clothing  --  3  -AH     Taking  care of personal grooming (such as brushing teeth)  --  3  -     Eating meals  --  3  -AH     AM-PAC 6 Clicks Score (OT)  --  18  -        Functional Assessment    Outcome Measure Options  AM-[facility] 6 Clicks Basic Mobility (PT)  -  AM-[facility] 6 Clicks Daily Activity (OT)  -       User Key  (r) = Recorded By, (t) = Taken By, (c) = Cosigned By    Initials Name Provider Type     Roseline Monroy Occupational Therapist    Harini Duarte, PT Physical Therapist    RM Aman Tipton, PTA Physical Therapy Assistant           Time Calculation:   Time Calculation- OT     Row Name 08/05/20 1055             Time Calculation- OT    OT Start Time  0948  -      OT Stop Time  1035  -      OT Time Calculation (min)  47 min  -      OT Received On  08/05/20  -      OT Goal Re-Cert Due Date  08/13/20  -         Timed Charges    19931 - OT Therapeutic Exercise Minutes  27  -      87623 - OT Therapeutic Activity Minutes  20  -        User Key  (r) = Recorded By, (t) = Taken By, (c) = Cosigned By    Initials Name Provider Type     Roseline Monroy Occupational Therapist        Therapy Charges for Today     Code Description Service Date Service Provider Modifiers Qty    71618445827 HC OT THER PROC EA 15 MIN 8/4/2020 Roseline Monroy 1    81003001932 HC OT THER PROC EA 15 MIN 8/5/2020 Roseline Monroy 2    79438705308 HC OT THERAPEUTIC ACT EA 15 MIN 8/5/2020 Roseline Monroy 1               Roseline Monroy  8/5/2020

## 2020-08-05 NOTE — THERAPY TREATMENT NOTE
Acute Care - Physical Therapy Treatment Note   [facility]     Patient Name: Crystal Ragsdale  : 1950  MRN: [id number]  Today's Date: 2020  Onset of Illness/Injury or Date of Surgery: 20     Referring Physician: Dr. Kruse    Admit Date: 8/3/2020    Visit Dx:    ICD-10-CM ICD-9-CM   1. Hypertensive urgency I16.0 401.9     Patient Active Problem List   Diagnosis   • Essential hypertension   • Anxiety associated with depression   • Hyperglycemia   • Encounter for Medicare annual wellness exam   • Tobacco abuse counseling   • Chronic pain of left ankle   • Colon cancer screening   • Hypertensive urgency       Therapy Treatment    Rehabilitation Treatment Summary     Row Name 2031             Treatment Time/Intention    Discipline  physical therapist  -LM      Document Type  therapy note (daily note)  -LM      Subjective Information  no complaints  -LM      Mode of Treatment  physical therapy  -LM      Patient/Family Observations  Pt received supine in bed and alone in room. She voices concern about her D/C plan and her future.  -LM      Care Plan Review  care plan/treatment goals reviewed;patient/other agree to care plan  -LM      Total Minutes, Physical Therapy Treatment  40  -LM      Therapy Frequency (PT Clinical Impression)  daily  -LM      Patient Effort  good  -LM      Existing Precautions/Restrictions  fall  -LM      Recorded by [LM] Harini Diana, PT 20 1020      Row Name 20             Safety Issues, Functional Mobility    Safety Issues Affecting Function (Mobility)  safety precaution awareness;safety precautions follow-through/compliance  -LM      Impairments Affecting Function (Mobility)  coordination;endurance/activity tolerance;motor control;sensation/sensory awareness;strength  -LM      Recorded by [LM] Harini Diana, PT 20 1020      Row Name 20             Bed Mobility Assessment/Treatment    Bed Mobility Assessment/Treatment  supine-sit  -LM       Supine-Sit [place] (Bed Mobility)  conditional independence  -LM      Assistive Device (Bed Mobility)  bed rails;head of bed elevated  -LM      Recorded by [LM] Harini Diana, PT 08/05/20 1020      Row Name 08/05/20 0931             Transfer Assessment/Treatment    Transfer Assessment/Treatment  sit-stand transfer;stand-sit transfer  -LM      Recorded by [LM] Harini Diana, PT 08/05/20 1020      Row Name 08/05/20 0931             Sit-Stand Transfer    Sit-Stand [place] (Transfers)  contact guard;verbal cues  -LM      Assistive Device (Sit-Stand Transfers)  walker, front-wheeled  -LM      Recorded by [LM] Harini Diana, PT 08/05/20 1020      Row Name 08/05/20 0931             Stand-Sit Transfer    Stand-Sit [place] (Transfers)  contact guard;verbal cues  -LM      Assistive Device (Stand-Sit Transfers)  walker, front-wheeled  -LM      Recorded by [LM] Harini Diana, PT 08/05/20 1020      Row Name 08/05/20 0931             Gait/Stairs Assessment/Training    Gait/Stairs Assessment/Training  gait/ambulation assistive device  -LM      [place] Level (Gait)  contact guard;verbal cues  -LM      Assistive Device (Gait)  walker, front-wheeled  -LM      Distance in Feet (Gait)  38'  -LM      Pattern (Gait)  step-through  -LM      Deviations/Abnormal Patterns (Gait)  left sided deviations;stride length decreased;gait speed decreased;base of support, wide  -LM      Bilateral Gait Deviations  foot drop/toe drag;heel strike decreased  -LM      Left Sided Gait Deviations  knee hyperextension;foot drop/toe drag;heel strike decreased  -LM      Recorded by [LM] Harini Diana, PT 08/05/20 1020      Row Name 08/05/20 0931             Therapeutic Exercise    Lower Extremity (Therapeutic Exercise)  gluteal sets;heel slides, left;LAQ (long arc quad), left;marching while seated;marching while standing;quad sets, bilateral;SAQ (short arc quad), left;SLR (straight leg raise), left;other (see comments) Bridging-L LE; sit to  stand; mini Squats in stand with RW  -LM      Exercise Type (Therapeutic Exercise)  AROM (active range of motion)  -LM      Position (Therapeutic Exercise)  seated;standing;supine  -LM      Sets/Reps (Therapeutic Exercise)  1 x 15  -LM      Recorded by [LM] Harini Diana, PT 08/05/20 1020      Row Name 08/05/20 0931             Static Sitting Balance    Level of [place] (Unsupported Sitting, Static Balance)  supervision  -LM      Sitting Position (Unsupported Sitting, Static Balance)  sitting on edge of bed  -LM      Recorded by [LM] Harini Diana, PT 08/05/20 1020      Row Name 08/05/20 0931             Dynamic Sitting Balance    Level of [place], Reaches Outside Midline (Sitting, Dynamic Balance)  supervision  -LM      Sitting Position, Reaches Outside Midline (Sitting, Dynamic Balance)  sitting on edge of bed  -LM      Recorded by [LM] Hairni Diana, PT 08/05/20 1020      Row Name 08/05/20 0931             Static Standing Balance    Level of [place] (Supported Standing, Static Balance)  contact guard assist  -LM      Assistive Device Utilized (Supported Standing, Static Balance)  walker, rolling  -LM      Recorded by [LM] Harini Diana, PT 08/05/20 1020      Row Name 08/05/20 0931             Dynamic Standing Balance    Level of [place], Reaches Outside Midline (Standing, Dynamic Balance)  contact guard assist  -LM      Assistive Device Utilized (Supported Standing, Dynamic Balance)  walker, rolling  -LM      Recorded by [LM] Harini Diana, PT 08/05/20 1020      Row Name 08/05/20 0931             Positioning and Restraints    Pre-Treatment Position  in bed  -LM      Post Treatment Position  chair  -LM      In Chair  sitting;call light within reach;encouraged to call for assist;with OT  -LM      Recorded by [LM] Harini Diana, PT 08/05/20 1020      Row Name 08/05/20 0931             Pain Scale: Numbers Pre/Post-Treatment    Pain Scale: Numbers, Pretreatment  0/10 - no pain  -LM      Pain Scale:  Numbers, Post-Treatment  0/10 - no pain  -LM      Recorded by [LM] AdalgisaHarini vail, PT 08/05/20 1020      Row Name 08/05/20 0931             Plan of Care Review    Progress  improving  -LM      Recorded by [LM] Adalgisa, Lisa, PT 08/05/20 1020      Row Name 08/05/20 0931             Outcome Summary/Treatment Plan (PT)    Daily Summary of Progress (PT)  progress toward functional goals as expected  -LM      Plan for Continued Treatment (PT)  Cont PT per POC to outlined goals.  -LM      Anticipated Discharge Disposition (PT)  inpatient rehabilitation facility  -LM      Recorded by [LM] AdalgisaHarini, PT 08/05/20 1020        User Key  (r) = Recorded By, (t) = Taken By, (c) = Cosigned By    Initials Name Effective Dates Discipline     AdalgisaHarini tan, PT 04/03/18 -  PT                   Physical Therapy Education                 Title: PT OT SLP Therapies (In Progress)     Topic: Physical Therapy (In Progress)     Point: Mobility training (Done)     Description:   Instruct learner(s) on safety and technique for assisting patient out of bed, chair or wheelchair.  Instruct in the proper use of assistive devices, such as walker, crutches, cane or brace.              Patient Friendly Description:   It's important to get you on your feet again, but we need to do so in a way that is safe for you. Falling has serious consequences, and your personal safety is the most important thing of all.        When it's time to get out of bed, one of us or a family member will sit next to you on the bed to give you support.     If your doctor or nurse tells you to use a walker, crutches, a cane, or a brace, be sure you use it every time you get out of bed, even if you think you don't need it.    Learning Progress Summary           Patient Acceptance, E,TB, VU by LM at 8/5/2020 1021    Comment:  Proper use of RW for safe transfers/ambulation; ther ex for HEP.    Acceptance, E,TB,D, VU,NR by  at 8/4/2020 9168    Comment:  safety and pacing  during gait    Acceptance, E,TB, VU by  at 8/3/2020 1558    Comment:  Role of PT while hospitalized and upon discharge to inpatient rehab                   Point: Home exercise program (Done)     Description:   Instruct learner(s) on appropriate technique for monitoring, assisting and/or progressing patient with therapeutic exercises and activities.              Learning Progress Summary           Patient Acceptance, E,TB, VU by  at 8/5/2020 1021    Comment:  Proper use of RW for safe transfers/ambulation; ther ex for HEP.    Acceptance, E,TB,D, VU,NR by  at 8/4/2020 1547    Comment:  safety and pacing during gait                   Point: Body mechanics (Not Started)     Description:   Instruct learner(s) on proper positioning and spine alignment for patient and/or caregiver during mobility tasks and/or exercises.              Learner Progress:   Not documented in this visit.          Point: Precautions (Done)     Description:   Instruct learner(s) on prescribed precautions during mobility and gait tasks              Learning Progress Summary           Patient Acceptance, E,TB, VU by  at 8/5/2020 1021    Comment:  Proper use of RW for safe transfers/ambulation; ther ex for HEP.                               User Key     Initials Effective Dates Name Provider Type Discipline     04/03/18 -  Billie Lawson, PT Physical Therapist PT     04/03/18 -  Harini Diana, PT Physical Therapist PT     03/07/18 -  Aman Tipton, PTA Physical Therapy Assistant PT                PT Recommendation and Plan  Anticipated Discharge Disposition (PT): inpatient rehabilitation facility  Therapy Frequency (PT Clinical Impression): daily  Outcome Summary/Treatment Plan (PT)  Daily Summary of Progress (PT): progress toward functional goals as expected  Plan for Continued Treatment (PT): Cont PT per POC to outlined goals.  Anticipated Discharge Disposition (PT): inpatient rehabilitation facility  Progress: improving  Outcome  Summary: PT tx completed. Patient able to perform bed mobility with conditional independence and sit to stand and ambulate 38' with RW, verbal cues and CGA. Performs LE ther ex in supine, sit and stand as indicated on care map. Emphasis on slow, controlled movements. Cont PT per POC to goals.  Outcome Measures     Row Name 08/05/20 0931 08/04/20 1422 08/04/20 1316       How much help from another person do you currently need...    Turning from your back to your side while in flat bed without using bedrails?  4  -LM  --  4  -RM    Moving from lying on back to sitting on the side of a flat bed without bedrails?  4  -LM  --  4  -RM    Moving to and from a bed to a chair (including a wheelchair)?  3  -LM  --  3  -RM    Standing up from a chair using your arms (e.g., wheelchair, bedside chair)?  3  -LM  --  3  -RM    Climbing 3-5 steps with a railing?  2  -LM  --  2  -RM    To walk in hospital room?  3  -LM  --  3  -RM    AM-PAC 6 Clicks Score (PT)  19  -LM  --  19  -RM       How much help from another is currently needed...    Putting on and taking off regular lower body clothing?  --  3  -AH  --    Bathing (including washing, rinsing, and drying)  --  3  -AH  --    Toileting (which includes using toilet bed pan or urinal)  --  3  -AH  --    Putting on and taking off regular upper body clothing  --  3  -AH  --    Taking care of personal grooming (such as brushing teeth)  --  4  -AH  --    Eating meals  --  4  -AH  --    AM-PAC 6 Clicks Score (OT)  --  20  -AH  --       Functional Assessment    Outcome Measure Options  AM-PAC 6 Clicks Basic Mobility (PT)  -LM  AM-PAC 6 Clicks Daily Activity (OT)  -AH  AM-PAC 6 Clicks Basic Mobility (PT)  -RM    Row Name 08/03/20 1244             How much help from another is currently needed...    Putting on and taking off regular lower body clothing?  3  -AH      Bathing (including washing, rinsing, and drying)  3  -AH      Toileting (which includes using toilet bed pan or urinal)  3   -      Putting on and taking off regular upper body clothing  3  -      Taking care of personal grooming (such as brushing teeth)  3  -      Eating meals  3  -      AM-PAC 6 Clicks Score (OT)  18  -         Functional Assessment    Outcome Measure Options  AM-PAC 6 Clicks Daily Activity (OT)  -        User Key  (r) = Recorded By, (t) = Taken By, (c) = Cosigned By    Initials Name Provider Type     Roseline Monroy Occupational Therapist    LM Harini Diana, PT Physical Therapist    Aman Bobby, PTA Physical Therapy Assistant         Time Calculation:   PT Charges     Row Name 08/05/20 1023             Time Calculation    Start Time  0931  -LM      PT Received On  08/05/20  -LM      PT Goal Re-Cert Due Date  08/15/20  -        User Key  (r) = Recorded By, (t) = Taken By, (c) = Cosigned By    Initials Name Provider Type     Harini Diana, PT Physical Therapist        Therapy Charges for Today     Code Description Service Date Service Provider Modifiers Qty    25997826174  PT NEUROMUSC RE EDUCATION EA 15 MIN 8/5/2020 Harini Diana, PT GP 1    72580370922 HC PT THER PROC EA 15 MIN 8/5/2020 Harini Diana, PT GP 1    57105822908 HC GAIT TRAINING EA 15 MIN 8/5/2020 Harini Diana, PT GP 1          PT G-Codes  Outcome Measure Options: AM-PAC 6 Clicks Basic Mobility (PT)  AM-PAC 6 Clicks Score (PT): 19  AM-PAC 6 Clicks Score (OT): 20    Harini Diana PT  8/5/2020

## 2020-08-05 NOTE — PLAN OF CARE
Problem: Patient Care Overview  Goal: Plan of Care Review  Outcome: Ongoing (interventions implemented as appropriate)  Flowsheets (Taken 8/5/2020 0027)  Outcome Summary: PT tx completed. Patient able to perform bed mobility with conditional independence and sit to stand and ambulate 38' with RW, verbal cues and CGA. Performs LE ther ex in supine, sit and stand as indicated on care map. Emphasis on slow, controlled movements. Cont PT per POC to goals.

## 2020-08-06 ENCOUNTER — HOSPITAL ENCOUNTER (INPATIENT)
Facility: HOSPITAL | Age: 70
LOS: 7 days | Discharge: HOME HEALTH | DRG: 057 | End: 2020-08-13
Attending: INTERNAL MEDICINE | Admitting: INTERNAL MEDICINE
Payer: MEDICARE

## 2020-08-06 VITALS
HEART RATE: 68 BPM | SYSTOLIC BLOOD PRESSURE: 144 MMHG | DIASTOLIC BLOOD PRESSURE: 72 MMHG | BODY MASS INDEX: 41.83 KG/M2 | WEIGHT: 227.29 LBS | HEIGHT: 62 IN | OXYGEN SATURATION: 95 % | TEMPERATURE: 98 F | RESPIRATION RATE: 18 BRPM

## 2020-08-06 PROBLEM — R53.81 DECLINING FUNCTIONAL STATUS: Status: ACTIVE | Noted: 2020-08-06

## 2020-08-06 PROBLEM — I63.9 ACUTE CVA (CEREBROVASCULAR ACCIDENT) (HCC): Status: ACTIVE | Noted: 2020-08-06

## 2020-08-06 PROBLEM — R73.03 BORDERLINE DIABETES MELLITUS: Status: ACTIVE | Noted: 2020-08-06

## 2020-08-06 PROBLEM — Z85.3 HISTORY OF BREAST CANCER: Status: ACTIVE | Noted: 2020-08-06

## 2020-08-06 LAB
ANION GAP SERPL CALCULATED.3IONS-SCNC: 10 MMOL/L (ref 5–15)
BUN SERPL-MCNC: 15 MG/DL (ref 8–23)
BUN/CREAT SERPL: 23.8 (ref 7–25)
CALCIUM SPEC-SCNC: 9 MG/DL (ref 8.6–10.5)
CHLORIDE SERPL-SCNC: 103 MMOL/L (ref 98–107)
CO2 SERPL-SCNC: 26 MMOL/L (ref 22–29)
CREAT SERPL-MCNC: 0.63 MG/DL (ref 0.57–1)
DEPRECATED RDW RBC AUTO: 41.5 FL (ref 37–54)
ERYTHROCYTE [DISTWIDTH] IN BLOOD BY AUTOMATED COUNT: 12.6 % (ref 12.3–15.4)
GFR SERPL CREATININE-BSD FRML MDRD: 94 ML/MIN/1.73
GLUCOSE SERPL-MCNC: 124 MG/DL (ref 65–99)
HCT VFR BLD AUTO: 44.2 % (ref 34–46.6)
HGB BLD-MCNC: 15.4 G/DL (ref 12–15.9)
MCH RBC QN AUTO: 31.6 PG (ref 26.6–33)
MCHC RBC AUTO-ENTMCNC: 34.8 G/DL (ref 31.5–35.7)
MCV RBC AUTO: 90.6 FL (ref 79–97)
PLATELET # BLD AUTO: 215 10*3/MM3 (ref 140–450)
PMV BLD AUTO: 9.5 FL (ref 6–12)
POTASSIUM SERPL-SCNC: 3.7 MMOL/L (ref 3.5–5.2)
RBC # BLD AUTO: 4.88 10*6/MM3 (ref 3.77–5.28)
SODIUM SERPL-SCNC: 139 MMOL/L (ref 136–145)
WBC # BLD AUTO: 8.09 10*3/MM3 (ref 3.4–10.8)

## 2020-08-06 PROCEDURE — 6370000000 HC RX 637 (ALT 250 FOR IP): Performed by: PHYSICIAN ASSISTANT

## 2020-08-06 PROCEDURE — 80048 BASIC METABOLIC PNL TOTAL CA: CPT | Performed by: NURSE PRACTITIONER

## 2020-08-06 PROCEDURE — 97530 THERAPEUTIC ACTIVITIES: CPT

## 2020-08-06 PROCEDURE — 97161 PT EVAL LOW COMPLEX 20 MIN: CPT

## 2020-08-06 PROCEDURE — 2500000003 HC RX 250 WO HCPCS: Performed by: PHYSICIAN ASSISTANT

## 2020-08-06 PROCEDURE — 97535 SELF CARE MNGMENT TRAINING: CPT

## 2020-08-06 PROCEDURE — 97165 OT EVAL LOW COMPLEX 30 MIN: CPT

## 2020-08-06 PROCEDURE — 96372 THER/PROPH/DIAG INJ SC/IM: CPT

## 2020-08-06 PROCEDURE — 25010000002 ENOXAPARIN PER 10 MG: Performed by: INTERNAL MEDICINE

## 2020-08-06 PROCEDURE — 1200000002 HC SEMI PRIVATE SWING BED

## 2020-08-06 PROCEDURE — 99217 PR OBSERVATION CARE DISCHARGE MANAGEMENT: CPT | Performed by: NURSE PRACTITIONER

## 2020-08-06 PROCEDURE — G0378 HOSPITAL OBSERVATION PER HR: HCPCS

## 2020-08-06 PROCEDURE — 85027 COMPLETE CBC AUTOMATED: CPT | Performed by: NURSE PRACTITIONER

## 2020-08-06 RX ORDER — LOSARTAN POTASSIUM 50 MG/1
50 TABLET ORAL DAILY
Status: DISCONTINUED | OUTPATIENT
Start: 2020-08-07 | End: 2020-08-10

## 2020-08-06 RX ORDER — ATORVASTATIN CALCIUM 40 MG/1
40 TABLET, FILM COATED ORAL DAILY
Status: DISCONTINUED | OUTPATIENT
Start: 2020-08-07 | End: 2020-08-13 | Stop reason: HOSPADM

## 2020-08-06 RX ORDER — LOSARTAN POTASSIUM 50 MG/1
50 TABLET ORAL DAILY
Status: ON HOLD | COMMUNITY
End: 2020-08-13 | Stop reason: HOSPADM

## 2020-08-06 RX ORDER — AMLODIPINE BESYLATE 5 MG/1
10 TABLET ORAL
Status: DISCONTINUED | OUTPATIENT
Start: 2020-08-06 | End: 2020-08-06 | Stop reason: HOSPADM

## 2020-08-06 RX ORDER — AMLODIPINE BESYLATE 10 MG/1
10 TABLET ORAL
Start: 2020-08-07 | End: 2020-08-26 | Stop reason: SDUPTHER

## 2020-08-06 RX ORDER — ATORVASTATIN CALCIUM 40 MG/1
40 TABLET, FILM COATED ORAL DAILY
COMMUNITY

## 2020-08-06 RX ORDER — ASPIRIN 81 MG/1
81 TABLET, CHEWABLE ORAL DAILY
Start: 2020-08-07

## 2020-08-06 RX ORDER — HYDROXYZINE HYDROCHLORIDE 25 MG/1
25 TABLET, FILM COATED ORAL EVERY 8 HOURS PRN
COMMUNITY

## 2020-08-06 RX ORDER — POLYETHYLENE GLYCOL 3350 17 G/17G
17 POWDER, FOR SOLUTION ORAL DAILY PRN
Status: DISCONTINUED | OUTPATIENT
Start: 2020-08-06 | End: 2020-08-13 | Stop reason: HOSPADM

## 2020-08-06 RX ORDER — ASPIRIN 81 MG/1
81 TABLET, CHEWABLE ORAL DAILY
COMMUNITY

## 2020-08-06 RX ORDER — CARVEDILOL 25 MG/1
25 TABLET ORAL 2 TIMES DAILY WITH MEALS
COMMUNITY

## 2020-08-06 RX ORDER — HYDROXYZINE HYDROCHLORIDE 25 MG/1
25 TABLET, FILM COATED ORAL EVERY 8 HOURS PRN
Status: DISCONTINUED | OUTPATIENT
Start: 2020-08-06 | End: 2020-08-13 | Stop reason: HOSPADM

## 2020-08-06 RX ORDER — AMLODIPINE BESYLATE 5 MG/1
10 TABLET ORAL DAILY
Status: DISCONTINUED | OUTPATIENT
Start: 2020-08-06 | End: 2020-08-13 | Stop reason: HOSPADM

## 2020-08-06 RX ORDER — NICOTINE 21 MG/24HR
1 PATCH, TRANSDERMAL 24 HOURS TRANSDERMAL
Start: 2020-08-07 | End: 2020-09-04

## 2020-08-06 RX ORDER — ATORVASTATIN CALCIUM 40 MG/1
40 TABLET, FILM COATED ORAL NIGHTLY
Start: 2020-08-06 | End: 2020-08-26 | Stop reason: SDUPTHER

## 2020-08-06 RX ORDER — ASPIRIN 81 MG/1
81 TABLET, CHEWABLE ORAL DAILY
Status: DISCONTINUED | OUTPATIENT
Start: 2020-08-07 | End: 2020-08-13 | Stop reason: HOSPADM

## 2020-08-06 RX ORDER — NICOTINE 21 MG/24HR
1 PATCH, TRANSDERMAL 24 HOURS TRANSDERMAL EVERY 24 HOURS
Status: DISCONTINUED | OUTPATIENT
Start: 2020-08-07 | End: 2020-08-08

## 2020-08-06 RX ORDER — ACETAMINOPHEN 325 MG/1
650 TABLET ORAL EVERY 4 HOURS PRN
Status: DISCONTINUED | OUTPATIENT
Start: 2020-08-06 | End: 2020-08-13 | Stop reason: HOSPADM

## 2020-08-06 RX ORDER — NICOTINE 21 MG/24HR
1 PATCH, TRANSDERMAL 24 HOURS TRANSDERMAL EVERY 24 HOURS
COMMUNITY

## 2020-08-06 RX ORDER — AMLODIPINE BESYLATE 10 MG/1
10 TABLET ORAL DAILY
COMMUNITY

## 2020-08-06 RX ORDER — FAMOTIDINE 20 MG/1
20 TABLET, FILM COATED ORAL 2 TIMES DAILY
Status: DISCONTINUED | OUTPATIENT
Start: 2020-08-06 | End: 2020-08-13 | Stop reason: HOSPADM

## 2020-08-06 RX ORDER — CARVEDILOL 25 MG/1
25 TABLET ORAL 2 TIMES DAILY WITH MEALS
Status: DISCONTINUED | OUTPATIENT
Start: 2020-08-06 | End: 2020-08-13 | Stop reason: HOSPADM

## 2020-08-06 RX ADMIN — ENOXAPARIN SODIUM 60 MG: 60 INJECTION SUBCUTANEOUS at 06:32

## 2020-08-06 RX ADMIN — CARVEDILOL 25 MG: 25 TABLET, FILM COATED ORAL at 08:27

## 2020-08-06 RX ADMIN — AMLODIPINE BESYLATE 10 MG: 5 TABLET ORAL at 08:26

## 2020-08-06 RX ADMIN — HYDROXYZINE HYDROCHLORIDE 25 MG: 25 TABLET, FILM COATED ORAL at 08:30

## 2020-08-06 RX ADMIN — NICOTINE 1 PATCH: 21 PATCH TRANSDERMAL at 08:27

## 2020-08-06 RX ADMIN — SODIUM CHLORIDE, PRESERVATIVE FREE 10 ML: 5 INJECTION INTRAVENOUS at 08:27

## 2020-08-06 RX ADMIN — TUBERCULIN PURIFIED PROTEIN DERIVATIVE 5 UNITS: 5 INJECTION, SOLUTION INTRADERMAL at 20:23

## 2020-08-06 RX ADMIN — LOSARTAN POTASSIUM 50 MG: 50 TABLET, FILM COATED ORAL at 08:26

## 2020-08-06 RX ADMIN — HYDROXYZINE HYDROCHLORIDE 25 MG: 25 TABLET, FILM COATED ORAL at 20:23

## 2020-08-06 RX ADMIN — FAMOTIDINE 20 MG: 20 TABLET, FILM COATED ORAL at 20:20

## 2020-08-06 RX ADMIN — CARVEDILOL 25 MG: 25 TABLET, FILM COATED ORAL at 17:22

## 2020-08-06 RX ADMIN — ASPIRIN 81 MG 81 MG: 81 TABLET ORAL at 08:27

## 2020-08-06 ASSESSMENT — PAIN SCALES - GENERAL
PAINLEVEL_OUTOF10: 0
PAINLEVEL_OUTOF10: 0

## 2020-08-06 ASSESSMENT — 9 HOLE PEG TEST
TEST_RESULT: IMPAIRED
TESTTIME_SECONDS: 26
TEST_RESULT: FUNCTIONAL
TESTTIME_SECONDS: 81

## 2020-08-06 NOTE — DISCHARGE SUMMARY
[facility] HOSPITALIST   DISCHARGE SUMMARY    Name:  Crystal Ragsdale   Age:  69 y.o.  Sex:  female  :  1950  MRN:  [id number]   Visit Number:  [id number]  Primary Care Physician:  Vermeesch, Marilyn K, MD  Date of Discharge:  2020  Admission Date:  8/3/2020      Important issues/recommendations to note:  Start Aspirin 81 mg daily   Start Atorvastatin 40 mg daily  Start Norvasc 10 mg daily  Stop Clonidine patch  Follow up with PCP after discharge from rehab  Follow up Cardiology in 2 week for event monitor placement  Neurology appointment 2020 at 1pm with Re GALARZA [facility]  NO driving a car or operating machinery until cleared by Neurology      Presenting Problem:    Hypertensive urgency [I16.0]       Discharge Diagnosis:       Acute CVA (cerebrovascular accident) (CMS/HCC)    Essential hypertension    Anxiety associated with depression    Tobacco abuse counseling    Hypertensive urgency    History of breast cancer    Borderline diabetes mellitus        Past Medical History:  Past Medical History:   Diagnosis Date   • Cancer (CMS/HCC)     Breast Cancer   • Impaired functional mobility, balance, gait, and endurance          Consults:     Consults     Date and Time Order Name Status Description    8/3/2020 1213 Inpatient Cardiology Consult Completed           Procedures Performed:  none             History of presenting illness/Hospital Course:  This is a 69-year-old female with a history of hypertension, anxiety, chronic tobacco abuse who presented to the emergency room with complaints of onset of left-sided numbness and weakness.  She been in her normal state of health earlier in the day when she took a nap and upon awakening she felt a heaviness in her left lower extremity.  She denied any visual changes, chest pain, slurred speech.  Upon evaluation in the emergency room her systolic blood pressure was 245.  Her blood pressure was noted to be  247/123.  She was given antihypertensive in the emergency room.  CT of the head was negative for acute process.  Given her symptoms and blood pressure she was admitted to the hospitalist service for further medical management.    Upon admission patient was treated for hypertensive urgency.   An MRI of the head was also done which did show findings consistent with acute infarcts involving both frontal lobes.  I did speak with Dr. Pinto with neurology on-call at [facility] in [place] regarding these findings who stated that there was no intervention at this point as patient is well outside the window for intervention.  She did recommend given that it is in the bilateral frontal lobes to look for an arrhythmia or cardiac cause.      Dr. Sheets with cardiology did see and evaluate patient. There has been no evidence of arrhythmia on tele.  Her 2D echo did not show any valvular abnormalities.  Her EF was noted to be  She was started on Aspirin and Statin therapy.  Her lipid panel was within normal limits.  There was no evidence of of stenosis with a normal carotid artery duplex.  Given that her TTE did not show any valvular abnormalities nor has telemetry shown any cardiac arrhythmias a JING would not have any added benefits as to her treatment plan.  She will need to follow-up with cardiology as an outpatient.  Given that there were no arrhythmias on telemetry she may require a Holter monitor for further evaluation.  Dr. Sheets with cardiology will follow her up in the office.  We will arrange for her to follow-up with neurology as an outpatient.  She has been counseled on smoking cessation.  I have also advised that she cannot operate a motor vehicle or any equipment until she is cleared by neurology.   Upon admission she was noted to have weakness of her left upper and lower extremity which is already showing some improvement.  PT OT was consulted for strength and mobility.  Speech therapy did see and evaluate at  bedside for which she had no evidence of dysphagia or aspiration.  Again she cannot operate motor vehicle or equipment until cleared by neurology.   She was noted to have Hgb A1C 6.3. Diabetes educator consulted.  It is recommended that she monitor her BS at home.      She does live alone and at this point it be difficult to go home without further therapy.  Case management was consulted and she has been accepted to [facility] bed.  Otherwise she has been stable since admission.  Her blood pressure is doing much better.  Strength wise she does have more strength in her left upper and lower extremity.  We will plan to discharge to rehab facility today.  Again she will need to follow-up with neurology and cardiology as an outpatient.    COVID screen for rehab placement is negative.          Vital Signs:    Temp:  [97.9 °F (36.6 °C)-98.3 °F (36.8 °C)] 98 °F (36.7 °C)  Heart Rate:  [55-69] 68  Resp:  [17-18] 18  BP: (138-173)/(61-83) 144/72    Physical Exam:  General Appearance:    Alert and cooperative, not in any acute distress.   Head:    Atraumatic and normocephalic, without obvious abnormality.   Eyes:            PERRLA, conjunctivae and sclerae normal, no Icterus. No pallor. Extra-occular movements are within normal limits.   Ears:    Ears appear intact with no abnormalities noted.   Throat:   No oral lesions, no thrush, oral mucosa moist.   Neck:   Supple, trachea midline, no thyromegaly, no carotid bruit.   Back:     No kyphoscoliosis present. No tenderness to palpation,   range of motion normal.   Lungs:     Chest shape is normal. Breath sounds heard bilaterally equally.  No crackles or wheezing. No Pleural rub or bronchial breathing.    Heart:    Normal S1 and S2, no murmur, no gallop, no rub. No JVD   Abdomen:     Normal bowel sounds, no masses, no organomegaly. Soft        non-tender, non-distended, no guarding, no rebound                tenderness   Extremities:   Moves all extremities well, no  edema, no cyanosis, no             Clubbing, strength LUE and LLE 4/5.    Pulses:   Pulses palpable and equal bilaterally   Skin:   No bleeding, bruising or rash     Psychiatric/Behavior:      Normal mood, normal behavior   Neurologic:   She is able move extremities on command.  No facial drooping, no slurred speech.           Pertinent Lab Results:     I have reviewed the labs done in the emergency room.  Results from last 7 days   Lab Units 08/06/20  0623 08/03/20  0615 08/03/20  0028   SODIUM mmol/L 139 140 140   POTASSIUM mmol/L 3.7 3.4* 3.7   CHLORIDE mmol/L 103 104 102   CO2 mmol/L 26.0 23.6 25.6   BUN mg/dL 15 10 11   CREATININE mg/dL 0.63 0.66 0.74   CALCIUM mg/dL 9.0 9.2 9.6   BILIRUBIN mg/dL  --   --  0.2   ALK PHOS U/L  --   --  133*   ALT (SGPT) U/L  --   --  13   AST (SGOT) U/L  --   --  14   GLUCOSE mg/dL 124* 165* 154*     Results from last 7 days   Lab Units 08/06/20  0623 08/03/20  0615 08/03/20  0028   WBC 10*3/mm3 8.09 10.35 9.43   HEMOGLOBIN g/dL 15.4 16.3* 17.0*   HEMATOCRIT % 44.2 46.5 47.9*   PLATELETS 10*3/mm3 215 258 263     Results from last 7 days   Lab Units 08/03/20  0028   INR  0.93     Results from last 7 days   Lab Units 08/03/20  0028   TROPONIN T ng/mL <0.010                           Invalid input(s): USDES,  BLOODU, NITRITITE, BACT, EP  Pain Management Panel     There is no flowsheet data to display.                Pertinent Radiology Results:    Imaging Results (All)     Procedure Component Value Units Date/Time    US Carotid Bilateral [[id number]] Collected:  08/03/20 1206     Updated:  08/03/20 1208    Narrative:       PROCEDURE: US CAROTID BILATERAL-     HISTORY: tia; I16.0-Hypertensive urgency     Technique: Real-time imaging was performed of the extracranial carotid  arteries in transverse and longitudinal planes, with color duplex  evaluation of blood flow velocity. Spectral analysis was performed. The  cervicovertebral arteries were also examined. Stenosis evaluation  is  based on validated velocity criteria.     Right carotid system:  CCA: 61 cm/s  ICA: 60 cm/s  ECA: 62 cm/s  Vertebral artery: Antegrade  ICA/CCA ratio: 0.98  No visible plaque is identified at the bifurcation.     Left carotid system:  CCA: 76 cm/s  ICA: 53 cm/s  ECA: 56 cm/s  Vertebral artery: Antegrade  ICA/CCA ratio: 0.70  No visible plaque is identified at the bifurcation.          Impression:       Normal carotid artery duplex.     This report was finalized on 8/3/2020 12:06 PM by Main Jenkins M.D..    MRI Brain Without Contrast [[id number]] Collected:  08/03/20 1112     Updated:  08/03/20 1117    Narrative:       PROCEDURE: MRI BRAIN WO CONTRAST-     HISTORY: Neuro deficit(s), subacute; I16.0-Hypertensive urgency     PROCEDURE: Multiplanar multisequence imaging of the brain was performed  without the use of intravenous contrast.     COMPARISON: None.     FINDINGS: There is generalized cerebral volume loss. There are a few  scattered FLAIR hyperintensities in the periventricular white matter of  both cerebral hemispheres consistent with chronic small vessel ischemic  change. There are areas of restricted diffusion in the periventricular  white matter of the right frontal lobe and in the subinsular region of  the left frontal lobe consistent with infarcts. There is no mass, mass  effect or midline shift. There is no hydrocephalus.     The midbrain, sebastian, cerebellum and craniocervical junction are  unremarkable. The sella and pituitary gland are within normal limits.  The major intracranial vasculature demonstrates the expected flow  related signal. The paranasal sinuses are clear.       Impression:       Findings consistent with acute infarcts involving both  frontal lobes.           This report was finalized on 8/3/2020 11:15 AM by Main Jenkins M.D..    CT Head Without Contrast [[id number]] Collected:  08/03/20 0918     Updated:  08/03/20 0921    Narrative:       PROCEDURE: CT HEAD WO CONTRAST-      HISTORY: headache, hypertensive, left sided numbness; I16.0-Hypertensive  urgency     COMPARISON:  None .     TECHNIQUE: Multiple axial CT images were performed from the foramen  magnum to the vertex without enhancement.      FINDINGS: There is no CT evidence of hemorrhage. There is no mass, mass  effect or midline shift.  There is no hydrocephalus. The paranasal  sinuses are clear. Bone windows reveal no acute osseous abnormalities.       Impression:       No acute intracranial process.           988.08 mGy.cm        This study was performed with techniques to keep radiation doses as low  as reasonably achievable (ALARA). Individualized dose reduction  techniques using automated exposure control or adjustment of mA and/or  kV according to the patient size were employed.      This report was finalized on 8/3/2020 9:19 AM by Main Jenkins M.D..          Echo:    Results for orders placed during the hospital encounter of 08/03/20   Transthoracic Echo Complete With Contrast if Necessary Per Protocol    Narrative 1.  Normal left ventricular size and systolic function, LVEF 65-70%.  2.  Mild concentric LVH.  3.  Normal LV diastolic filling pattern.  4.  Grossly normal RV size and systolic function.  5.  No significant valvular abnormalities.       Condition on Discharge:    Stable        Discharge Disposition:    Short Term Hospital (DC - External)    Discharge Medication:       Discharge Medications      New Medications      Instructions Start Date   amLODIPine 10 MG tablet  Commonly known as:  NORVASC   10 mg, Oral, Every 24 Hours Scheduled   Start Date:  August 7, 2020     aspirin 81 MG chewable tablet   81 mg, Oral, Daily   Start Date:  August 7, 2020     atorvastatin 40 MG tablet  Commonly known as:  LIPITOR   40 mg, Oral, Nightly      nicotine 21 MG/24HR patch  Commonly known as:  NICODERM CQ   1 patch, Transdermal, Every 24 Hours Scheduled   Start Date:  August 7, 2020        Continue These Medications       Instructions Start Date   carvedilol 25 MG tablet  Commonly known as:  COREG   25 mg, Oral, 2 Times Daily With Meals      hydrOXYzine 25 MG tablet  Commonly known as:  ATARAX   25 mg, Oral, Every 8 Hours PRN      losartan 50 MG tablet  Commonly known as:  COZAAR   50 mg, Oral, Daily         Stop These Medications    cloNIDine 0.2 MG/24HR patch  Commonly known as:  CATAPRES-TTS            Discharge Diet:     Diet Instructions     Diet: Cardiac, Consistent Carbohydrate      Discharge Diet:   Cardiac  Consistent Carbohydrate             Activity at Discharge:     Activity Instructions     Discharge Activity      PT/OT evaluation at rehab.          Follow-up Appointments:    Future Appointments   Date Time Provider Department Center   8/7/2020  1:00 PM Re Rosa PA-C [facility]         Test Results Pending at Discharge:           [facility]  08/06/20  09:00    Time: 40 minutes were spent reviewing labs, history, evaluating patient and discharge planning.

## 2020-08-06 NOTE — PROGRESS NOTES
Physical Therapy    Facility/Department: [facility] MED SURG  Initial Assessment    NAME: David Vidal  : 1950  MRN: [id number]    Date of Service: 2020    Discharge Recommendations:  Continue to assess pending progress        Assessment   Body structures, Functions, Activity limitations: Decreased high-level IADLs;Decreased strength;Decreased functional mobility ; Decreased balance  Assessment: s/p CVA with left side weakness; decreased balance-unsteady gait; fall risk; will benefit from PT to help restore strength and functional mobility. Treatment Diagnosis: s/p CVA with left side weakness; decreased balance-unsteady gait; fall risk  Prognosis: Good  Decision Making: Low Complexity  REQUIRES PT FOLLOW UP: Yes  Activity Tolerance  Activity Tolerance: Patient Tolerated treatment well       Patient Diagnosis(es): There were no encounter diagnoses. has no past medical history on file. has no past surgical history on file. Restrictions  Restrictions/Precautions  Restrictions/Precautions: General Precautions, Fall Risk  Required Braces or Orthoses?: No  Vision/Hearing  Vision: Impaired  Vision Exceptions: Wears glasses for reading  Hearing: Within functional limits     Subjective  General  Chart Reviewed: Yes  Patient assessed for rehabilitation services?: Yes  Response To Previous Treatment: Not applicable  Family / Caregiver Present: No  Referring Practitioner: Alyssa Momin MD  Diagnosis: s/p CVA, left side weakness  Follows Commands: Within Functional Limits  Subjective  Subjective: Admitted from [address]; states this past  she got up and felt funny; legs felt weak, went to ER; primary c/o left UE and left knee and foot.   Pain Screening  Patient Currently in Pain: Denies  Vital Signs  Patient Currently in Pain: Denies       Orientation  Orientation  Overall Orientation Status: Within Normal Limits  Social/Functional History  Social/Functional History  Lives With: Alone  Type of Home: House  Home Layout: One Balance Training, Functional Mobility Training, Transfer Training, Gait Training, Safety Education & Training, Home Exercise Program               Goals  Long term goals  Time Frame for Long term goals : 1-2 weeks  Long term goal 1: Achieve Good (-) static standing balance, and Fair dynamic standing balance. Long term goal 2: Achieve mod independence with basic transfers. Long term goal 3: Ambulate 150 feet x 2 with RW or cane with Supervision-mod Independent with good safety awareness. Therapy Time   Individual Concurrent Group Co-treatment   Time In 1519         Time Out 1542         Minutes 1520 [facility]       Certification of Medical Necessity: It will be understood that this treatment plan is certified medically necessary by the documenting therapist and referring physician mentioned in this report. Unless the physician indicated otherwise through written correspondence with our office, all further referrals will act as certification of medical necessity on this treatment plan. Thank you for this referral.  If you have questions regarding this plan of care, please call [phone].           Revisions to this plan (optional):                     Please sign and return this plan to:   FAX: [phone]      Signature:                                 Date:

## 2020-08-06 NOTE — PLAN OF CARE
Problem: Patient Care Overview  Goal: Plan of Care Review  Outcome: Ongoing (interventions implemented as appropriate)  Note:   Pt resting well this shift.  VSS.  Labs monitored daily.  Telemetry monitoring continued.  Pt anticipating being able to go to Psychiatric bed unit in 1-2 days.

## 2020-08-06 NOTE — PROGRESS NOTES
Occupational Therapy   Occupational Therapy Initial Assessment  Date: 2020   Patient Name: Rosie Pisano  MRN: [id number]     : 1950    Date of Service: 2020    Discharge Recommendations:  Home with Home health OT, Outpatient OT  OT Equipment Recommendations  Equipment Needed: Yes  Other: RW, shower chair (tub transfer bench), BSC    Assessment   Performance deficits / Impairments: Decreased balance;Decreased fine motor control;Decreased high-level IADLs;Decreased endurance;Decreased ADL status; Decreased coordination  Assessment: Pt agreeable to OT services. Pt presents with hemiparesis of L side. Pt has fine motor coordination deficits and gross motor coordination deficits impeding ability to self dress, groom, and complete toileting hygiene. Pt having difficulty with cup management during drinking. Pt 9 hold peg test indicative of moderate fine motor impairments. Pt educated in compensatory strategies for dressing tasks. Pt will benefit from skilled OT services while IP. Prognosis: Good  Decision Making: Low Complexity  REQUIRES OT FOLLOW UP: Yes  Activity Tolerance  Activity Tolerance: Patient Tolerated treatment well           Patient Diagnosis(es): There were no encounter diagnoses. has a past medical history of Anxiety and depression, Borderline diabetic, Cancer ([address] Cerebral artery occlusion with cerebral infarction ([facility] [address] and Hypertension. has a past surgical history that includes Hysterectomy () and Breast lumpectomy (). Restrictions  Restrictions/Precautions  Restrictions/Precautions: General Precautions, Fall Risk  Required Braces or Orthoses?: No    Subjective   General  Chart Reviewed: Yes  Patient assessed for rehabilitation services?: Yes  Family / Caregiver Present: No  Referring Practitioner: Joy Keller PA-C  Diagnosis: CVA, HTN, Functional decline  Subjective  Subjective: Pt reports  she was having HTN and felt off.  Pt called EMS and had elevated BP. Pt had MRI revealing CVA. Pt transferred to [facility] program.  Patient Currently in Pain: Denies  Pain Assessment  Pain Assessment: 0-10  Pain Level: 0  Vital Signs  Temp: 97.7 °F (36.5 °C)  Temp Source: Temporal  Pulse: 69  Heart Rate Source: Monitor  Resp: 18  BP: (!) 176/90  BP Location: Left Arm  BP Upper/Lower: Upper  MAP (mmHg): 111  Patient Currently in Pain: Denies  Oxygen Therapy  SpO2: 93 %  O2 Device: None (Room air)  Social/Functional History  Social/Functional History  Lives With: Alone  Type of Home: House  Home Layout: One level  Home Access: Stairs to enter without rails  Entrance Stairs - Number of Steps: 1  Bathroom Shower/Tub: Tub/Shower unit  Bathroom Toilet: Standard  Bathroom Accessibility: Walker accessible  ADL Assistance: Independent  Homemaking Assistance: Independent  Homemaking Responsibilities: Yes  Ambulation Assistance: Independent  Transfer Assistance: Independent  Active : Yes       Objective   Vision: Impaired  Vision Exceptions: Wears glasses for reading  Hearing: Within functional limits    Orientation  Overall Orientation Status: Within Functional Limits  Observation/Palpation  Posture: Good  Observation: in bed, NAD, pleasant and cooperative  Balance  Sitting Balance: Independent  Standing Balance: Contact guard assistance  Functional Mobility  Functional - Mobility Device: Rolling Walker  Activity: Other  Assist Level: Contact guard assistance  Functional Mobility Comments: 40 feet  ADL  LE Dressing: Contact guard assistance  Toileting: (reports mild difficulty with lorraine hygiene)  Tone RUE  RUE Tone: Normotonic  Tone LUE  LUE Tone: Normotonic  Coordination  Movements Are Fluid And Coordinated: No  Coordination and Movement description: Fine motor impairments;Gross motor impairments;Decreased accuracy; Decreased speed;Left UE     Bed mobility  Rolling to Left: Modified independent  Supine to Sit: Modified independent  Sit to Supine: Modified independent  Scooting: Modified test improving LUE score to 40 seconds to improve independence with ADL's and meal prep. Long term goal 5: Pt to complete meal prep with MOD I. Patient Goals   Patient goals : Be able to complete crafts again. Therapy Time   Individual Concurrent Group Co-treatment   Time In 0321         Time Out 0404         Minutes 43              This note serves as a DC summary in the event of pt discharge.      Maddie Fan, OTR/L

## 2020-08-06 NOTE — PROGRESS NOTES
Case Management Discharge Note      Final Note: Transferred to [facility]     Provided Post Acute Provider List?: N/A  N/A Provider List Comment: Did not want to discuss HH or rehab needs until after evaluated by PT    Destination      No service has been selected for the patient.      Durable Medical Equipment      No service has been selected for the patient.      Dialysis/Infusion      No service has been selected for the patient.      Home Medical Care      No service has been selected for the patient.      Therapy      No service has been selected for the patient.      Community Resources      No service has been selected for the patient.        Transportation Services  Private: Car    Final Discharge Disposition Code: 61 - hospital-based swing bed

## 2020-08-06 NOTE — DISCHARGE INSTRUCTIONS
Start Aspirin 81 mg daily   Start Atorvastatin 40 mg daily  Start Norvasc 10 mg daily  Stop Clonidine patch  Follow up with PCP after discharge from rehab  Follow up Cardiology in 2 week for event monitor placement  Neurology appointment 8/7/2020 at 1pm with Re GALARZA [facility] Neurology [facility]  NO driving a car or operating machinery until cleared by Neurology

## 2020-08-06 NOTE — ACP (ADVANCE CARE PLANNING)
Advance Care Planning     Advance Care Planning Activator (Inpatient)  Conversation Note      Date of ACP Conversation: 8/6/2020    Conversation Conducted with: Patient with Decision Making Capacity    ACP Activator: Zayda Vivar    *When Decision Maker makes decisions on behalf of the incapacitated patient: Decision Maker is asked to consider and make decisions based on patient values, known preferences, or best interests. Health Care Decision Maker:     Current Designated Health Care Decision Maker:   (If there is a valid [place] named in the [facility] Makers\" box in the ACP activity, but it is not visible above, be sure to open that field and then select the health care decision maker relationship (ie \"primary\") in the blank space to the right of the name.) Validate  this information as still accurate & up-to-date; edit [place] field as needed.)    Note: Assess and validate information in current ACP documents, as indicated. If no Decision Maker listed above or available through scanned documents, then:    If no Authorized Decision Maker has previously been identified, then patient chooses [place]:  \"Who would you like to name as your primary health care decision-maker? \"               Name: Ceci Rice        Relationship: Sister          Phone number: [phone] [facility])  \"Can this person be reached easily? \" Yes  \"Who would you like to name as your back-up decision maker? \"   Name: Mayank Ingram or Andreas Nam        Relationship: daughters          Phone number: will find the numbers  \"Can this person be reached easily? \" by phone    Note: If the relationship of these Decision-Makers to the patient does NOT follow your state's Next of Kin hierarchy, recommend that patient complete ACP document that meets state-specific requirements to allow them to act on the patient's behalf when appropriate. Care Preferences    Ventilation:   \"If you were in your present state of health and suddenly became very ill and were unable to breathe on your own, what would your preference be about the use of a ventilator (breathing machine) if it were available to you? \"      Would the patient desire the use of ventilator (breathing machine)?: no    \"If your health worsens and it becomes clear that your chance of recovery is unlikely, what would your preference be about the use of a ventilator (breathing machine) if it were available to you? \"     Would the patient desire the use of ventilator (breathing machine)?: No      Resuscitation  \"CPR works best to restart the heart when there is a sudden event, like a heart attack, in someone who is otherwise healthy. Unfortunately, CPR does not typically restart the heart for people who have serious health conditions or who are very sick. \"    \"In the event your heart stopped as a result of an underlying serious health condition, would you want attempts to be made to restart your heart (answer \"yes\" for attempt to resuscitate) or would you prefer a natural death (answer \"no\" for do not attempt to resuscitate)? \" no      NOTE: If the patient has a valid advance directive AND now provides care preference(s) that are inconsistent with that prior directive, advise the patient to consider either: creating a new advance directive that complies with state-specific requirements; or, if that is not possible, orally revoking that prior directive in accordance with state-specific requirements, which must be documented in the EHR. [x] Yes   [] No   Educated Patient / Nafisa Ireland regarding differences between Advance Directives and portable DNR orders.     Length of ACP Conversation in minutes:  5    Conversation Outcomes:  [x] ACP discussion completed  [] Existing advance directive reviewed with patient; no changes to patient's previously recorded wishes  [] New Advance Directive completed  [] Portable Do Not Rescitate prepared for Provider review and signature  [x] POLST/POST/MOLST/MOST prepared for Provider review and signature      Follow-up plan:    [x] Schedule follow-up conversation to continue planning  [] Referred individual to Provider for additional questions/concerns   [] Advised patient/agent/surrogate to review completed ACP document and update if needed with changes in condition, patient preferences or care setting  [] This note routed to one or more involved healthcare providers    **Pt is requesting information on living will. Living will packet to bedside for pt to review.

## 2020-08-06 NOTE — PROGRESS NOTES
5/2/2213    I certify that the skilled nursing and/or rehabilitation services are required to be given on a daily basis, which as a practical matter can only be provided in a skilled nursing unit on an inpatient basis.     Electronically signed by Zayda Vivar RN on 8/6/2020 at 1:15 PM

## 2020-08-07 ENCOUNTER — OFFICE VISIT (OUTPATIENT)
Dept: NEUROLOGY | Facility: CLINIC | Age: 70
End: 2020-08-07

## 2020-08-07 VITALS
SYSTOLIC BLOOD PRESSURE: 148 MMHG | BODY MASS INDEX: 41.77 KG/M2 | OXYGEN SATURATION: 95 % | WEIGHT: 227 LBS | HEIGHT: 62 IN | TEMPERATURE: 97.8 F | DIASTOLIC BLOOD PRESSURE: 100 MMHG | HEART RATE: 68 BPM

## 2020-08-07 DIAGNOSIS — I63.9 ACUTE CVA (CEREBROVASCULAR ACCIDENT) (HCC): Primary | ICD-10-CM

## 2020-08-07 PROBLEM — E11.9 TYPE 2 DIABETES MELLITUS WITHOUT COMPLICATION, WITHOUT LONG-TERM CURRENT USE OF INSULIN (HCC): Status: ACTIVE | Noted: 2020-08-07

## 2020-08-07 PROBLEM — F41.9 ANXIETY: Status: ACTIVE | Noted: 2020-08-07

## 2020-08-07 PROBLEM — Z72.0 TOBACCO ABUSE: Status: ACTIVE | Noted: 2020-08-07

## 2020-08-07 PROBLEM — I10 HYPERTENSION: Status: ACTIVE | Noted: 2020-08-07

## 2020-08-07 LAB
A/G RATIO: 1.2 (ref 0.8–2)
ALBUMIN SERPL-MCNC: 3.6 G/DL (ref 3.4–4.8)
ALP BLD-CCNC: 100 U/L (ref 25–100)
ALT SERPL-CCNC: 10 U/L (ref 4–36)
ANION GAP SERPL CALCULATED.3IONS-SCNC: 10 MMOL/L (ref 3–16)
AST SERPL-CCNC: 13 U/L (ref 8–33)
BASOPHILS ABSOLUTE: 0 K/UL (ref 0–0.1)
BASOPHILS RELATIVE PERCENT: 0.5 %
BILIRUB SERPL-MCNC: 0.5 MG/DL (ref 0.3–1.2)
BUN BLDV-MCNC: 15 MG/DL (ref 6–20)
CALCIUM SERPL-MCNC: 8.7 MG/DL (ref 8.5–10.5)
CHLORIDE BLD-SCNC: 105 MMOL/L (ref 98–107)
CO2: 26 MMOL/L (ref 20–30)
CREAT SERPL-MCNC: 0.6 MG/DL (ref 0.4–1.2)
EOSINOPHILS ABSOLUTE: 0.1 K/UL (ref 0–0.4)
EOSINOPHILS RELATIVE PERCENT: 1 %
GFR AFRICAN AMERICAN: >59
GFR NON-AFRICAN AMERICAN: >60
GLOBULIN: 2.9 G/DL
GLUCOSE BLD-MCNC: 115 MG/DL (ref 74–106)
HCT VFR BLD CALC: 42.7 % (ref 37–47)
HEMOGLOBIN: 14.8 G/DL (ref 11.5–16.5)
IMMATURE GRANULOCYTES #: 0 K/UL
IMMATURE GRANULOCYTES %: 0.2 % (ref 0–5)
LYMPHOCYTES ABSOLUTE: 2.7 K/UL (ref 1.5–4)
LYMPHOCYTES RELATIVE PERCENT: 30.3 %
MAGNESIUM: 2.2 MG/DL (ref 1.7–2.4)
MCH RBC QN AUTO: 31.1 PG (ref 27–32)
MCHC RBC AUTO-ENTMCNC: 34.7 G/DL (ref 31–35)
MCV RBC AUTO: 89.7 FL (ref 80–100)
MONOCYTES ABSOLUTE: 0.5 K/UL (ref 0.2–0.8)
MONOCYTES RELATIVE PERCENT: 5.6 %
NEUTROPHILS ABSOLUTE: 5.5 K/UL (ref 2–7.5)
NEUTROPHILS RELATIVE PERCENT: 62.4 %
PDW BLD-RTO: 12.5 % (ref 11–16)
PLATELET # BLD: 212 K/UL (ref 150–400)
PMV BLD AUTO: 9.8 FL (ref 6–10)
POTASSIUM REFLEX MAGNESIUM: 3.4 MMOL/L (ref 3.4–5.1)
RBC # BLD: 4.76 M/UL (ref 3.8–5.8)
SODIUM BLD-SCNC: 141 MMOL/L (ref 136–145)
TOTAL PROTEIN: 6.5 G/DL (ref 6.4–8.3)
WBC # BLD: 8.8 K/UL (ref 4–11)

## 2020-08-07 PROCEDURE — 80053 COMPREHEN METABOLIC PANEL: CPT

## 2020-08-07 PROCEDURE — 6360000002 HC RX W HCPCS: Performed by: PHYSICIAN ASSISTANT

## 2020-08-07 PROCEDURE — 97802 MEDICAL NUTRITION INDIV IN: CPT

## 2020-08-07 PROCEDURE — 97530 THERAPEUTIC ACTIVITIES: CPT

## 2020-08-07 PROCEDURE — 97110 THERAPEUTIC EXERCISES: CPT

## 2020-08-07 PROCEDURE — 36415 COLL VENOUS BLD VENIPUNCTURE: CPT

## 2020-08-07 PROCEDURE — 6370000000 HC RX 637 (ALT 250 FOR IP): Performed by: PHYSICIAN ASSISTANT

## 2020-08-07 PROCEDURE — 2700000000 HC OXYGEN THERAPY PER DAY

## 2020-08-07 PROCEDURE — 99204 OFFICE O/P NEW MOD 45 MIN: CPT | Performed by: PHYSICIAN ASSISTANT

## 2020-08-07 PROCEDURE — 1200000002 HC SEMI PRIVATE SWING BED

## 2020-08-07 PROCEDURE — 83735 ASSAY OF MAGNESIUM: CPT

## 2020-08-07 PROCEDURE — 99305 1ST NF CARE MODERATE MDM 35: CPT | Performed by: INTERNAL MEDICINE

## 2020-08-07 PROCEDURE — 85025 COMPLETE CBC W/AUTO DIFF WBC: CPT

## 2020-08-07 PROCEDURE — 97535 SELF CARE MNGMENT TRAINING: CPT

## 2020-08-07 RX ADMIN — ASPIRIN 81 MG: 81 TABLET, CHEWABLE ORAL at 08:04

## 2020-08-07 RX ADMIN — ATORVASTATIN CALCIUM 40 MG: 40 TABLET, FILM COATED ORAL at 08:04

## 2020-08-07 RX ADMIN — LOSARTAN POTASSIUM 50 MG: 50 TABLET ORAL at 08:04

## 2020-08-07 RX ADMIN — ENOXAPARIN SODIUM 40 MG: 40 INJECTION SUBCUTANEOUS at 08:05

## 2020-08-07 RX ADMIN — FAMOTIDINE 20 MG: 20 TABLET, FILM COATED ORAL at 19:55

## 2020-08-07 RX ADMIN — CARVEDILOL 25 MG: 25 TABLET, FILM COATED ORAL at 05:43

## 2020-08-07 RX ADMIN — HYDROXYZINE HYDROCHLORIDE 25 MG: 25 TABLET, FILM COATED ORAL at 19:55

## 2020-08-07 RX ADMIN — AMLODIPINE BESYLATE 10 MG: 5 TABLET ORAL at 08:04

## 2020-08-07 RX ADMIN — CARVEDILOL 25 MG: 25 TABLET, FILM COATED ORAL at 16:30

## 2020-08-07 RX ADMIN — FAMOTIDINE 20 MG: 20 TABLET, FILM COATED ORAL at 08:04

## 2020-08-07 ASSESSMENT — PAIN SCALES - GENERAL: PAINLEVEL_OUTOF10: 0

## 2020-08-07 NOTE — NURSING NOTE
Message left with sister, Chuyita Nair. Crystal's home medicines were left at hospital on discharge to rehab. Chuyita stated that other sister was taking Crystal to [facility] for Dr. Ralph today and would probably stop and pick them up.

## 2020-08-07 NOTE — CARE COORDINATION
PT: patient out of room for MD appt.   Electronically signed by Charles Helton PT on 8/7/2020 at 2:25 PM

## 2020-08-07 NOTE — FLOWSHEET NOTE
08/07/20 0800   Assessment   Charting Type Shift assessment   Neurological   Neuro (WDL) WDL   [place] Coma Scale   Eye Opening 4   Best Verbal Response 5   Best Motor Response 6   [place]ilio Coma Scale Score 15   HEENT   HEENT (WDL) X   Voice Normal   Mucous Membrane Moist   Teeth Partial plate lower   Respiratory   Respiratory (WDL) X   Respiratory Pattern Regular   Respiratory Depth Normal   Respiratory Quality/Effort Unlabored   Chest Assessment Chest expansion symmetrical   L Breath Sounds Clear;Diminished   R Breath Sounds Clear;Diminished   Breath Sounds   Right Upper Lobe Diminished   Right Middle Lobe Diminished   Right Lower Lobe Diminished   Left Upper Lobe Clear;Diminished   Left Lower Lobe Clear;Diminished   Cardiac   Cardiac (WDL) WDL   Gastrointestinal   Abdominal (WDL) X   GI Symptoms Flatus   Abdomen Inspection Soft   Tenderness Soft;Nontender   RUQ Bowel Sounds Active   LUQ Bowel Sounds Active   RLQ Bowel Sounds Active   LLQ Bowel Sounds Active   Peripheral Vascular   Peripheral Vascular (WDL) X   Edema Right lower extremity; Left lower extremity   RLE Edema Trace   LLE Edema Trace   RUE Neurovascular Assessment   Capillary Refill Less than/equal to 3 seconds   Color Pink   LUE Neurovascular Assessment   Capillary Refill Less than/equal to 3 seconds   Color [place]   RLE Neurovascular Assessment   Capillary Refill Less than/equal to 3 seconds   Color [place]   LLE Neurovascular Assessment   Capillary Refill Less than/equal to 3 seconds   Color Pink   Skin Color/Condition   Skin Color/Condition (WDL) WDL   Skin Integrity   Skin Integrity (WDL) WDL   Musculoskeletal   Musculoskeletal (WDL) X   RUE Full movement   LUE Full movement;Weakness   RL Extremity Full movement   LL Extremity Full movement;Weakness; Unsteady   Genitourinary   Genitourinary (WDL) WDL   Psychosocial   Psychosocial (WDL) WDL

## 2020-08-07 NOTE — PROGRESS NOTES
"Subjective     Chief Complaint: stroke      History of Present Illness   Crystal Ragsdale is a 69 y.o. female who comes to clinic today following a hospitalization at [facility] from 8/3/20-8/6/20 for stroke. She noted sudden onset left sided weakness just prior to her admission. She denies any additional associated neurologic symptoms. Her blood pressure was noted to be 247/123 upon admission. An MRI of the brain showed bilateral acute frontal infarcts. A carotid US and echo were unremarkable for any cardio-embolic source. She was treated with ASA 81mg daily and Lipitor 40mg daily. Since her hospitalization, she notes that her symptoms have improved, though have not yet resolved. She is currently participating in inpatient rehabilitation at [facility].       I have reviewed and confirmed the past family, social and medical history as accurate on 8/7/2020.     Review of Systems   HENT: Negative.    Eyes: Negative.    Respiratory: Negative.    Cardiovascular: Negative.    Gastrointestinal: Negative.    Endocrine: Negative.    Genitourinary: Negative.    Musculoskeletal: Negative.    Skin: Negative.    Allergic/Immunologic: Negative.    Neurological: Positive for weakness.   Hematological: Negative.    Psychiatric/Behavioral: Negative.        Objective     /100   Pulse 68   Temp 97.8 °F (36.6 °C)   Ht 157.5 cm (62\")   Wt 103 kg (227 lb)   SpO2 95%   BMI 41.52 kg/m²     General appearance today is normal.   Peripheral pulses were present and symmetric.  The ophthalmoscopic exam today is unremarkable. The discs and posterior elements are unremarkable.      Physical Exam   Neurological: She has a normal Finger-Nose-Finger Test.   Reflex Scores:       Bicep reflexes are 1+ on the right side and 1+ on the left side.       Brachioradialis reflexes are 1+ on the right side and 1+ on the left side.       Patellar reflexes are 1+ on the right side and 1+ on the left side.  Psychiatric: Her speech is " normal.        Neurologic Exam     Mental Status   Speech: speech is normal   Level of consciousness: alert  Normal comprehension.     Cranial Nerves   Cranial nerves II through XII intact.     Motor Exam   Muscle bulk: normal  Overall muscle tone: normal    Strength   Strength 5/5 except as noted. Left sided weakness (4+/5 left upper extremity, 3+/5 left lower extremity)     Sensory Exam   Light touch normal.     Gait, Coordination, and Reflexes     Gait  Gait: (in wheelchair)    Coordination   Finger to nose coordination: normal    Tremor   Resting tremor: absent    Reflexes   Right brachioradialis: 1+  Left brachioradialis: 1+  Right biceps: 1+  Left biceps: 1+  Right patellar: 1+  Left patellar: 1+        Assessment/Plan   Crystal was seen today for stroke.    Diagnoses and all orders for this visit:    Acute CVA (cerebrovascular accident) (CMS/[facility])          Discussion/Summary   Crystal Ragsdale comes to clinic today with a history of stroke, suspected to be of cardioembolic etiology. This was discussed in detail. Her workup has been complete and appropriate. Therefore, I do not have any further recommendations concerning this. After discussing potential treatment options, it was elected to continue on ASA and Lipitor unchanged for secondary stroke prevention. I strongly recommended that she keep her upcoming cardiology appointment. I counseled her on the importance of tight blood pressure control as well as the benefits of the Mediterranean diet. I also counseled her on the importance of continued smoking cessation. She will then follow up in our clinic  on an as needed basis.   I spent 45 minutes face to face with the patient with 30 minutes spent on discussing diagnosis, prognosis, diagnostic testing, evaluation, current status, treatment options and management as discussed above.       As part of this visit I reviewed radiology results, reviewed radiology images and reviewed records from prior hospitalizations  which is incorporated in the HPI.      Re Rosa PA-C

## 2020-08-07 NOTE — H&P
History and Physical    Patient:  Lindsey Cotton    CHIEF COMPLAINT:    Declining functional status following recent CVA    HISTORY OF PRESENT ILLNESS:   The patient is a 77 y.o. female with past medical history of hypertension, anxiety, chronic tobacco abuse who was admitted to [facility] after acute onset of left-sided numbness and weakness. Patient found to have acute infarcts involving both frontal lobes and was admitted for further care. She was evaluated by neurologist, Dr. Paddy El who stated there was no intervention at this point since patient was well outside the window for intervention. Given that this was a bilateral frontal lobe infarct, arrhythmia/cardiac causes needed to be ruled out. Dr. Alysia Carney with cardiology evaluated the patient. There was no evidence of arrhythmia on telemetry. Her echo did not show any valvular abnormalities. She was started on secondary prophylactic measures with daily aspirin and statin therapy. Lipid panel was within normal limits. There was no evidence of stenosis on the carotid artery duplex. Patient was seen by PT OT for strength and mobility improvement. She continued to display left-sided weakness and was ultimately discharged to swing bed for ongoing PT OT. Patient did not have any dysphagia or aspiration. A1c 6.3 and patient instructed to monitor her blood glucoses at home. Past Medical History:      Diagnosis Date    Anxiety and depression     Borderline diabetic     Cancer ([facility] [address] 2000    Right Breast     Cerebral artery occlusion with cerebral infarction ([facility] [address]     Hypertension        Past Surgical History:      Procedure Laterality Date    BREAST LUMPECTOMY  2000    Right     HYSTERECTOMY  1994       Medications Prior to Admission:    Prior to Admission medications    Medication Sig Start Date End Date Taking?  Authorizing Provider   amLODIPine (NORVASC) 10 MG tablet Take 10 mg by mouth daily   Yes Historical Provider, MD   aspirin 81 MG chewable tablet Take 81 mg by mouth daily   Yes Historical Provider, MD   atorvastatin (LIPITOR) 40 MG tablet Take 40 mg by mouth daily   Yes Historical Provider, MD   nicotine (NICODERM CQ) 21 MG/24HR Place 1 patch onto the skin every 24 hours   Yes Historical Provider, MD   carvedilol (COREG) 25 MG tablet Take 25 mg by mouth 2 times daily (with meals)   Yes Historical Provider, MD   hydrOXYzine (ATARAX) 25 MG tablet Take 25 mg by mouth every 8 hours as needed for Itching or Anxiety   Yes Historical Provider, MD   losartan (COZAAR) 50 MG tablet Take 50 mg by mouth daily   Yes Historical Provider, MD       Allergies:  Patient has no known allergies. Social History:   TOBACCO:   reports that she has been smoking. She has been smoking about 1.00 pack per day. She does not have any smokeless tobacco history on file. ETOH:   reports previous alcohol use. OCCUPATION:  None     Family History:   History reviewed. No pertinent family history. Review of system  Constitutional:  Denies fever or chills   Eyes:  Denies eye pain or redness  HENT:  Denies nasal congestion or sore throat   Respiratory:  Denies cough or shortness of breath   Cardiovascular:  Denies chest pain or edema   GI:  Denies abdominal pain, nausea, vomiting, bloody stools or diarrhea   :  Denies dysuria or frequency  Musculoskeletal:  Denies acute neck pain or body aches  Integument:  Denies rash or itching  Neurologic:  Positive for left sided weakness. Denies headache, dizziness, numbness, tingling Psychiatric:  Denies acute depression or acute anxiety      Vital Signs  Temp: 97.2 °F (36.2 °C)  Pulse: 63  Resp: 18  BP: (!) 142/79  SpO2: 93 %  O2 Device: None (Room air)       vital signs reviewed in electronic chart.     Physical exam  Constitutional:  Well developed, well nourished, elderly female in no acute distress  Eyes:  PERRL, no scleral icterus, conjunctiva normal   HENT:  Atraumatic, external ears normal, nose normal, oropharynx moist, no pharyngeal exudates. Neck- supple, no JVD, no lymphadenopathy  Respiratory:  No respiratory distress, no wheezing, rales or rhonchi detected  Cardiovascular:  Normal rate, normal rhythm, no murmurs, no gallops, no rubs, no edema   GI:  Soft, nondistended, normal bowel sounds, nontender, no voluntary guarding  Musculoskeletal:  No cyanosis or obvious acute deformity. Moving all extremities   Integument:  Warm and dry. Lymphatic:  No cervical or axillary lymphadenopathy noted   Neurologic:  Alert & oriented x 3. Strength left upper extremity and left lower extremity 4/5. Psychiatric:  Speech and behavior appropriate         Lab Results   Component Value Date     08/07/2020    K 3.4 08/07/2020     08/07/2020    CO2 26 08/07/2020    BUN 15 08/07/2020    CREATININE 0.6 08/07/2020    GLUCOSE 115 (H) 08/07/2020    CALCIUM 8.7 08/07/2020    PROT 6.5 08/07/2020    LABALBU 3.6 08/07/2020    BILITOT 0.5 08/07/2020    ALKPHOS 100 08/07/2020    AST 13 08/07/2020    ALT 10 08/07/2020    LABGLOM >60 08/07/2020    GFRAA >59 08/07/2020    AGRATIO 1.2 08/07/2020    GLOB 2.9 08/07/2020           Lab Results   Component Value Date    WBC 8.8 08/07/2020    HGB 14.8 08/07/2020    HCT 42.7 08/07/2020    MCV 89.7 08/07/2020     08/07/2020       PA/lat CXR:   No orders to display         EKG: NSR    Assessment and Plan     Active Hospital Problems    Diagnosis Date Noted    CVA (cerebral vascular accident) [facility]) [I63.9]  -Admitted to [facility] 8/3/2020-8/6/2020 for acute infarcts involving both frontal lobes. Patient was outside the window for intervention at time of presentation.  -Echo did not show any valvular abnormalities  -No arrhythmias noted on telemetry  -Fasting lipid panel within normal limits  -A1c 6.3  -Counseled on smoking cessation  -Has residual left-sided weakness.   No speech difficulties or dysphasia.  -Evaluated by Dr. Pelon Lind with neurology who recommended secondary

## 2020-08-07 NOTE — CONSULTS
for age/condition and to provide information on prescribed diet.        Nutrition Monitoring and Evaluation:   Behavioral-Environmental Outcomes:  Readiness for Change   Food/Nutrient Intake Outcomes:  Food and Nutrient Intake, Supplement Intake  Physical Signs/Symptoms Outcomes:  Biochemical Data, Nutrition Focused Physical Findings     Discharge Planning:    Continue current diet     Electronically signed by Endy Bhatt on 8/7/20 at 11:20 AM EDT

## 2020-08-07 NOTE — PROGRESS NOTES
Occupational Therapy  Facility/Department: [address] [place] [address] MED SURG  Daily Treatment Note  NAME: Venus Huerta  : 1950  MRN: [id number]    Date of Service: 2020    Discharge Recommendations:  Home with Home health OT, Outpatient OT       Assessment   Assessment: Pt demo good activity tolerance on this date. Demo improve balance with ambulation and transfer to and from toilet. Pt able to toilet with SBA. Pt completed BUE strengthening exercises with resistive band. Pt completed in hand manipulation skills including rotation during tx. Pt did have several instances where she dropped materials. Will continue to tx deficits as tolerated. Patient Education: Pt education on exercises and fine motor coordination activites to be completed over the weekend. Pt voiced understanding. Patient Diagnosis(es): There were no encounter diagnoses. has a past medical history of Anxiety and depression, Borderline diabetic, Cancer ([facility] [address] Cerebral artery occlusion with cerebral infarction ([facility] [address] and Hypertension. has a past surgical history that includes Hysterectomy () and Breast lumpectomy (). Restrictions  Restrictions/Precautions  Restrictions/Precautions: General Precautions, Fall Risk  Required Braces or Orthoses?: No  Subjective   General  Chart Reviewed: Yes  Patient assessed for rehabilitation services?: Yes  Family / Caregiver Present: No  Referring Practitioner: Joaquín Yu PA-C  Diagnosis: CVA, HTN, Functional decline  Subjective  Subjective: Pt reports  she was having HTN and felt off. Pt called EMS and had elevated BP. Pt had MRI revealing CVA.  Pt transferred to [facility] program.      Orientation     Objective    ADL  Toileting: Stand by assistance        Functional Mobility  Functional - Mobility Device: Rolling Walker  Activity: To/from bathroom  Assist Level: Contact guard assistance(<>SBA)  Functional Mobility Comments: 40 feet        Type of ROM/Therapeutic Exercise  Type of ROM/Therapeutic Exercise: Resistive Bands  Comment: Orange theraband  Exercises  Scapular Retraction: x15  Shoulder Flexion: x15  Shoulder Extension: x15  Horizontal ABduction: x15  Elbow Flexion: x15  Elbow Extension: x15  Other: Fine motor coordination activities including green putty  x15 x2 sets, tip pinch x10, digit extension x8, finger adduction x8, in hand manipulation skills using blocks for rotation, oppopsition x10, Fine motor threading with medium beads. , Functional reaching seated using LUE. Plan   Plan  Times per week: 3-5  Times per day: Daily  Plan weeks: 2  Current Treatment Recommendations: Balance Training, Self-Care / ADL, Neuromuscular Re-education, Safety Education & Training, Endurance Training, Patient/Caregiver Education & Training    Goals  Short term goals  Time Frame for Short term goals: 1 week  Short term goal 1: Pt to complete hygiene/grooming with MOD I using LUE. Short term goal 2: Pt to complete bathing demonstrating good safety with SUP using LUE. Short term goal 3: Pt to complete dressing with GREY. Short term goal 4: Pt to improve 9 hole peg test of LUE 20 seconds (61) to improve independence with dressing and leisure tasks. Short term goal 5: Pt to improve independence with self feeding using LUE with no spillage to MOD I. Long term goals  Time Frame for Long term goals : 2 weeks  Long term goal 1: Pt to complete bathing with MOD I using LUE for BUE coordination  Long term goal 2: Pt to complete dressing with MOD I. Long term goal 3: Pt to complete functional reaching with good gross coordination with no spillage of items. Long term goal 4: Pt to complete 9 hole peg test improving LUE score to 40 seconds to improve independence with ADL's and meal prep. Long term goal 5: Pt to complete meal prep with MOD I. Patient Goals   Patient goals : Be able to complete crafts again.        Therapy Time   Individual Concurrent Group Co-treatment   Time In 1022         Time Out 1105 Minutes 43              This note serves as a DC summary in the event of pt discharge.      Maddie Fan, OTR/L

## 2020-08-08 PROCEDURE — 6360000002 HC RX W HCPCS: Performed by: PHYSICIAN ASSISTANT

## 2020-08-08 PROCEDURE — 6370000000 HC RX 637 (ALT 250 FOR IP): Performed by: PHYSICIAN ASSISTANT

## 2020-08-08 PROCEDURE — 1200000002 HC SEMI PRIVATE SWING BED

## 2020-08-08 RX ORDER — NICOTINE 21 MG/24HR
1 PATCH, TRANSDERMAL 24 HOURS TRANSDERMAL EVERY 24 HOURS
Status: DISCONTINUED | OUTPATIENT
Start: 2020-08-08 | End: 2020-08-13 | Stop reason: HOSPADM

## 2020-08-08 RX ORDER — CLONIDINE HYDROCHLORIDE 0.1 MG/1
0.1 TABLET ORAL 2 TIMES DAILY
Status: DISCONTINUED | OUTPATIENT
Start: 2020-08-08 | End: 2020-08-12

## 2020-08-08 RX ADMIN — ATORVASTATIN CALCIUM 40 MG: 40 TABLET, FILM COATED ORAL at 08:55

## 2020-08-08 RX ADMIN — AMLODIPINE BESYLATE 10 MG: 5 TABLET ORAL at 08:55

## 2020-08-08 RX ADMIN — LOSARTAN POTASSIUM 50 MG: 50 TABLET ORAL at 08:56

## 2020-08-08 RX ADMIN — ASPIRIN 81 MG: 81 TABLET, CHEWABLE ORAL at 08:55

## 2020-08-08 RX ADMIN — CLONIDINE HYDROCHLORIDE 0.1 MG: 0.1 TABLET ORAL at 08:55

## 2020-08-08 RX ADMIN — ENOXAPARIN SODIUM 40 MG: 40 INJECTION SUBCUTANEOUS at 08:57

## 2020-08-08 RX ADMIN — FAMOTIDINE 20 MG: 20 TABLET, FILM COATED ORAL at 20:52

## 2020-08-08 RX ADMIN — CARVEDILOL 25 MG: 25 TABLET, FILM COATED ORAL at 17:52

## 2020-08-08 RX ADMIN — FAMOTIDINE 20 MG: 20 TABLET, FILM COATED ORAL at 08:55

## 2020-08-08 RX ADMIN — CARVEDILOL 25 MG: 25 TABLET, FILM COATED ORAL at 08:55

## 2020-08-08 RX ADMIN — CLONIDINE HYDROCHLORIDE 0.1 MG: 0.1 TABLET ORAL at 20:52

## 2020-08-09 LAB
ANION GAP SERPL CALCULATED.3IONS-SCNC: 10 MMOL/L (ref 3–16)
BUN BLDV-MCNC: 17 MG/DL (ref 6–20)
CALCIUM SERPL-MCNC: 8.9 MG/DL (ref 8.5–10.5)
CHLORIDE BLD-SCNC: 104 MMOL/L (ref 98–107)
CO2: 27 MMOL/L (ref 20–30)
CREAT SERPL-MCNC: 0.6 MG/DL (ref 0.4–1.2)
FOLATE: 9.86 NG/ML
GFR AFRICAN AMERICAN: >59
GFR NON-AFRICAN AMERICAN: >60
GLUCOSE BLD-MCNC: 118 MG/DL (ref 74–106)
HCT VFR BLD CALC: 41.7 % (ref 37–47)
HEMOGLOBIN: 14.4 G/DL (ref 11.5–16.5)
MCH RBC QN AUTO: 30.9 PG (ref 27–32)
MCHC RBC AUTO-ENTMCNC: 34.5 G/DL (ref 31–35)
MCV RBC AUTO: 89.5 FL (ref 80–100)
PDW BLD-RTO: 12.5 % (ref 11–16)
PLATELET # BLD: 220 K/UL (ref 150–400)
PMV BLD AUTO: 9.7 FL (ref 6–10)
POTASSIUM SERPL-SCNC: 3.3 MMOL/L (ref 3.4–5.1)
RBC # BLD: 4.66 M/UL (ref 3.8–5.8)
SODIUM BLD-SCNC: 141 MMOL/L (ref 136–145)
TSH SERPL DL<=0.05 MIU/L-ACNC: 1.41 UIU/ML (ref 0.35–5.5)
VITAMIN B-12: 311 PG/ML (ref 211–911)
WBC # BLD: 8.4 K/UL (ref 4–11)

## 2020-08-09 PROCEDURE — 1200000002 HC SEMI PRIVATE SWING BED

## 2020-08-09 PROCEDURE — 82607 VITAMIN B-12: CPT

## 2020-08-09 PROCEDURE — 6360000002 HC RX W HCPCS: Performed by: PHYSICIAN ASSISTANT

## 2020-08-09 PROCEDURE — 36415 COLL VENOUS BLD VENIPUNCTURE: CPT

## 2020-08-09 PROCEDURE — 80048 BASIC METABOLIC PNL TOTAL CA: CPT

## 2020-08-09 PROCEDURE — 6370000000 HC RX 637 (ALT 250 FOR IP): Performed by: PHYSICIAN ASSISTANT

## 2020-08-09 PROCEDURE — 82746 ASSAY OF FOLIC ACID SERUM: CPT

## 2020-08-09 PROCEDURE — 85027 COMPLETE CBC AUTOMATED: CPT

## 2020-08-09 PROCEDURE — 84443 ASSAY THYROID STIM HORMONE: CPT

## 2020-08-09 RX ORDER — POTASSIUM CHLORIDE 20 MEQ/1
40 TABLET, EXTENDED RELEASE ORAL ONCE
Status: COMPLETED | OUTPATIENT
Start: 2020-08-09 | End: 2020-08-09

## 2020-08-09 RX ADMIN — FAMOTIDINE 20 MG: 20 TABLET, FILM COATED ORAL at 08:31

## 2020-08-09 RX ADMIN — CARVEDILOL 25 MG: 25 TABLET, FILM COATED ORAL at 08:31

## 2020-08-09 RX ADMIN — FAMOTIDINE 20 MG: 20 TABLET, FILM COATED ORAL at 20:53

## 2020-08-09 RX ADMIN — CARVEDILOL 25 MG: 25 TABLET, FILM COATED ORAL at 17:16

## 2020-08-09 RX ADMIN — ENOXAPARIN SODIUM 40 MG: 40 INJECTION SUBCUTANEOUS at 08:31

## 2020-08-09 RX ADMIN — ATORVASTATIN CALCIUM 40 MG: 40 TABLET, FILM COATED ORAL at 08:31

## 2020-08-09 RX ADMIN — ASPIRIN 81 MG: 81 TABLET, CHEWABLE ORAL at 08:31

## 2020-08-09 RX ADMIN — CLONIDINE HYDROCHLORIDE 0.1 MG: 0.1 TABLET ORAL at 08:31

## 2020-08-09 RX ADMIN — POTASSIUM CHLORIDE 40 MEQ: 1500 TABLET, EXTENDED RELEASE ORAL at 11:25

## 2020-08-09 RX ADMIN — AMLODIPINE BESYLATE 10 MG: 5 TABLET ORAL at 08:31

## 2020-08-09 RX ADMIN — LOSARTAN POTASSIUM 50 MG: 50 TABLET ORAL at 08:31

## 2020-08-09 RX ADMIN — CLONIDINE HYDROCHLORIDE 0.1 MG: 0.1 TABLET ORAL at 20:53

## 2020-08-09 NOTE — FLOWSHEET NOTE
08/08/20 2052   Assessment   Charting Type Shift assessment   Neurological   Neuro (WDL) WDL   [place] Coma Scale   Eye Opening 4   Best Verbal Response 5   Best Motor Response 6   Abilio Coma Scale Score 15   HEENT   HEENT (WDL) X   Voice Normal   Mucous Membrane Moist   Teeth Partial plate lower   Respiratory   Respiratory (WDL) X   Respiratory Pattern Regular   Respiratory Depth Normal   Respiratory Quality/Effort Unlabored   Chest Assessment Chest expansion symmetrical   L Breath Sounds Clear;Diminished   R Breath Sounds Clear;Diminished   Breath Sounds   Right Upper Lobe Clear   Right Middle Lobe Diminished   Right Lower Lobe Diminished   Left Upper Lobe Clear;Diminished   Left Lower Lobe Clear;Diminished   Cardiac   Cardiac (WDL) WDL   Cardiac Monitor   Telemetry Monitor On No   Gastrointestinal   Abdominal (WDL) X   GI Symptoms Flatus   Abdomen Inspection Soft   Last BM (including prior to admit) 08/06/20   Tenderness Soft;Nontender   RUQ Bowel Sounds Active   LUQ Bowel Sounds Active   RLQ Bowel Sounds Active   LLQ Bowel Sounds Active   Peripheral Vascular   Peripheral Vascular (WDL) X   Edema Right lower extremity; Left lower extremity   RLE Edema Trace   LLE Edema Trace   RUE Neurovascular Assessment   Capillary Refill Less than/equal to 3 seconds   Color Pink   R Radial Pulse   ( )   LUE Neurovascular Assessment   Capillary Refill Less than/equal to 3 seconds   Color [place]   RLE Neurovascular Assessment   Capillary Refill Less than/equal to 3 seconds   Color [place]   LLE Neurovascular Assessment   Capillary Refill Less than/equal to 3 seconds   Color Pink   Skin Color/Condition   Skin Color/Condition (WDL) WDL   Skin Integrity   Skin Integrity (WDL) WDL   Musculoskeletal   Musculoskeletal (WDL) X   RUE Full movement   LUE Full movement;Weakness   RL Extremity Full movement   LL Extremity Full movement;Weakness; Unsteady   Genitourinary   Genitourinary (WDL) WDL   Urine Assessment   Incontinence No Psychosocial   Psychosocial (WDL) WDL

## 2020-08-10 LAB — SARS-COV-2, NAAT: NOT DETECTED

## 2020-08-10 PROCEDURE — 1200000002 HC SEMI PRIVATE SWING BED

## 2020-08-10 PROCEDURE — 92610 EVALUATE SWALLOWING FUNCTION: CPT

## 2020-08-10 PROCEDURE — 6370000000 HC RX 637 (ALT 250 FOR IP): Performed by: PHYSICIAN ASSISTANT

## 2020-08-10 PROCEDURE — 97535 SELF CARE MNGMENT TRAINING: CPT

## 2020-08-10 PROCEDURE — 97530 THERAPEUTIC ACTIVITIES: CPT

## 2020-08-10 PROCEDURE — U0002 COVID-19 LAB TEST NON-CDC: HCPCS

## 2020-08-10 PROCEDURE — 97110 THERAPEUTIC EXERCISES: CPT

## 2020-08-10 PROCEDURE — 97116 GAIT TRAINING THERAPY: CPT

## 2020-08-10 PROCEDURE — 6360000002 HC RX W HCPCS: Performed by: PHYSICIAN ASSISTANT

## 2020-08-10 PROCEDURE — 97803 MED NUTRITION INDIV SUBSEQ: CPT

## 2020-08-10 RX ORDER — LOSARTAN POTASSIUM 50 MG/1
100 TABLET ORAL DAILY
Status: DISCONTINUED | OUTPATIENT
Start: 2020-08-11 | End: 2020-08-13 | Stop reason: HOSPADM

## 2020-08-10 RX ADMIN — CARVEDILOL 25 MG: 25 TABLET, FILM COATED ORAL at 08:57

## 2020-08-10 RX ADMIN — CARVEDILOL 25 MG: 25 TABLET, FILM COATED ORAL at 17:11

## 2020-08-10 RX ADMIN — FAMOTIDINE 20 MG: 20 TABLET, FILM COATED ORAL at 08:57

## 2020-08-10 RX ADMIN — LOSARTAN POTASSIUM 50 MG: 50 TABLET ORAL at 08:56

## 2020-08-10 RX ADMIN — FAMOTIDINE 20 MG: 20 TABLET, FILM COATED ORAL at 20:33

## 2020-08-10 RX ADMIN — CLONIDINE HYDROCHLORIDE 0.1 MG: 0.1 TABLET ORAL at 08:57

## 2020-08-10 RX ADMIN — HYDROXYZINE HYDROCHLORIDE 25 MG: 25 TABLET, FILM COATED ORAL at 20:32

## 2020-08-10 RX ADMIN — HYDROXYZINE HYDROCHLORIDE 25 MG: 25 TABLET, FILM COATED ORAL at 10:30

## 2020-08-10 RX ADMIN — ASPIRIN 81 MG: 81 TABLET, CHEWABLE ORAL at 08:57

## 2020-08-10 RX ADMIN — AMLODIPINE BESYLATE 10 MG: 5 TABLET ORAL at 08:57

## 2020-08-10 RX ADMIN — ENOXAPARIN SODIUM 40 MG: 40 INJECTION SUBCUTANEOUS at 08:57

## 2020-08-10 RX ADMIN — CLONIDINE HYDROCHLORIDE 0.1 MG: 0.1 TABLET ORAL at 20:33

## 2020-08-10 RX ADMIN — ATORVASTATIN CALCIUM 40 MG: 40 TABLET, FILM COATED ORAL at 08:57

## 2020-08-10 ASSESSMENT — PAIN SCALES - GENERAL
PAINLEVEL_OUTOF10: 0

## 2020-08-10 NOTE — PROGRESS NOTES
Occupational Therapy  Facility/Department: [address] [place] [address] MED SURG  Daily Treatment Note  NAME: Can Templeton  : 1950  MRN: [id number]    Date of Service: 8/10/2020    Discharge Recommendations:  Home with Home health OT, Outpatient OT       Assessment   Assessment: Pt agreeable to OT services. Pt completed BUE ther ex using orange theraband x10 reps seated in chair. Pt toileted twice during treatement session without assistance or verbal cuing. Pt completed transfers with MOD I. Pt demonstrated improved fine motor coordination skills on this date. Pt demonstrated ability to translate/transpose beads to and from palm, pt manipulated pegs in L hand with minimal droppage during task. Pt able to bead necklace with resistive connective beads on this date. Pt tolerated tx well. Pt demonstrated ability to use blow dryer and hold comb with good bilateral coordination. Patient Diagnosis(es): There were no encounter diagnoses. has a past medical history of Anxiety and depression, Borderline diabetic, Cancer ([facility] [address] Cerebral artery occlusion with cerebral infarction ([facility] [address] and Hypertension. has a past surgical history that includes Hysterectomy () and Breast lumpectomy (). Restrictions  Restrictions/Precautions  Restrictions/Precautions: General Precautions, Fall Risk  Required Braces or Orthoses?: No  Subjective   General  Chart Reviewed: Yes  Patient assessed for rehabilitation services?: Yes  Family / Caregiver Present: No  Referring Practitioner: Amairani Quintana PA-C  Diagnosis: CVA, HTN, Functional decline  Subjective  Subjective: Pt reports  she was having HTN and felt off. Pt called EMS and had elevated BP. Pt had MRI revealing CVA.  Pt transferred to [facility] program.      Orientation     Objective             Type of ROM/Therapeutic Exercise  Comment: Orange theraband  Exercises  Scapular Retraction: x10  Shoulder Flexion: x10  Shoulder Extension: x10  Horizontal ABduction: x10  Elbow Flexion: x10  Elbow Extension: x10  Other: Fine motor coordination activities including green putty  x15 , tip pinch x15, digit extension x15, finger adduction x15, in hand manipulation skills using peg board, bead pickup and turning using L hand, bead connector blocks. Plan   Plan  Times per week: 3-5  Times per day: Daily  Plan weeks: 2  Current Treatment Recommendations: Balance Training, Self-Care / ADL, Neuromuscular Re-education, Safety Education & Training, Endurance Training, Patient/Caregiver Education & Training    Goals  Short term goals  Time Frame for Short term goals: 1 week  Short term goal 1: Pt to complete hygiene/grooming with MOD I using LUE. Short term goal 2: Pt to complete bathing demonstrating good safety with SUP using LUE. Short term goal 3: Pt to complete dressing with GREY. Short term goal 4: Pt to improve 9 hole peg test of LUE 20 seconds (61) to improve independence with dressing and leisure tasks. Short term goal 5: Pt to improve independence with self feeding using LUE with no spillage to MOD I. Long term goals  Time Frame for Long term goals : 2 weeks  Long term goal 1: Pt to complete bathing with MOD I using LUE for BUE coordination  Long term goal 2: Pt to complete dressing with MOD I. Long term goal 3: Pt to complete functional reaching with good gross coordination with no spillage of items. Long term goal 4: Pt to complete 9 hole peg test improving LUE score to 40 seconds to improve independence with ADL's and meal prep. Long term goal 5: Pt to complete meal prep with MOD I. Patient Goals   Patient goals : Be able to complete crafts again. Therapy Time   Individual Concurrent Group Co-treatment   Time In 2301         Time Out 0345         Minutes 68              This note serves as a DC summary in the event of pt discharge.      Maddie Fan OTR/L

## 2020-08-10 NOTE — PROGRESS NOTES
assistance;Contact guard assistance  Quality of Gait: decreased active DF on left  Gait Deviations: Slow Ada;Decreased step length;Decreased step height  Distance: 55 feet and 65 feet     Balance  Posture: Good  Sitting - Static: Good  Sitting - Dynamic: Good  Standing - Static: Fair;+  Standing - Dynamic: Fair  Exercises  Hip Flexion: 2x15  Hip Abduction: 2x15  Knee Long Arc Quad: 2x15  Ankle Pumps: 2x15        Goals  Long term goals  Time Frame for Long term goals : 1-2 weeks  Long term goal 1: Achieve Good (-) static standing balance, and Fair dynamic standing balance. Long term goal 2: Achieve mod independence with basic transfers. Long term goal 3: Ambulate 150 feet x 2 with RW or cane with Supervision-mod Independent with good safety awareness. Plan    Plan  Times per week: 4-5 x week  Plan weeks: 1-2 weeks  Current Treatment Recommendations: Strengthening, Neuromuscular Re-education, Balance Training, Functional Mobility Training, Transfer Training, Gait Training, Safety Education & Training, Home Exercise Program  Safety Devices  Type of devices: Left in chair, Call light within reach     Therapy Time   Individual Concurrent Group Co-treatment   Time In 4575         Time Out 1632         Minutes 12              This note serves as D/C summary if patient is discharged prior to next visit.     Daniel Apa, PTA

## 2020-08-10 NOTE — FLOWSHEET NOTE
08/10/20 0800   Assessment   Charting Type Shift assessment   Neurological   Neuro (WDL) WDL   [place] Coma Scale   Eye Opening 4   Best Verbal Response 5   Best Motor Response 6   [place] Coma Scale Score 15   HEENT   HEENT (WDL) X   Voice Normal   Mucous Membrane Moist   Teeth Partial plate lower   Respiratory   Respiratory (WDL) X   Respiratory Pattern Regular   Respiratory Depth Normal   Respiratory Quality/Effort Unlabored   Chest Assessment Chest expansion symmetrical   L Breath Sounds Clear;Diminished   R Breath Sounds Clear;Diminished   Breath Sounds   Right Upper Lobe Clear   Right Middle Lobe Diminished   Right Lower Lobe Diminished   Left Upper Lobe Clear;Diminished   Left Lower Lobe Clear;Diminished   Cardiac   Cardiac (WDL) WDL   Cardiac Monitor   Telemetry Monitor On No   Gastrointestinal   Abdominal (WDL) X   GI Symptoms Flatus   Abdomen Inspection Soft   Tenderness Soft;Nontender   RUQ Bowel Sounds Active   LUQ Bowel Sounds Active   RLQ Bowel Sounds Active   LLQ Bowel Sounds Active   Peripheral Vascular   Peripheral Vascular (WDL) X   Edema Right lower extremity; Left lower extremity   RLE Edema Trace   LLE Edema Trace   RUE Neurovascular Assessment   Capillary Refill Less than/equal to 3 seconds   Color Pink   LUE Neurovascular Assessment   Capillary Refill Less than/equal to 3 seconds   Color [place]   RLE Neurovascular Assessment   Capillary Refill Less than/equal to 3 seconds   Color [place]   LLE Neurovascular Assessment   Capillary Refill Less than/equal to 3 seconds   Color Pink   Skin Color/Condition   Skin Color/Condition (WDL) WDL   Skin Integrity   Skin Integrity (WDL) WDL   Musculoskeletal   Musculoskeletal (WDL) X   RUE Full movement   LUE Full movement;Weakness   RL Extremity Full movement   LL Extremity Full movement;Weakness; Unsteady   Genitourinary   Genitourinary (WDL) WDL   Urine Assessment   Incontinence No   Psychosocial   Psychosocial (WDL) WDL

## 2020-08-10 NOTE — PROGRESS NOTES
Nutrition Assessment     Type and Reason for Visit: Patient Education(follow up)    Nutrition Recommendations/Plan: Encourage patient to continue to follow a low sodium, low fat, low cholesterol diet (cardiac). Nutrition Assessment:  Pt is stable from a nutrition standpoint, but may have nutrition food knowledge deficit. Will encourage compliance and provide written and verbal info on cardiac diet for s/p stroke        Estimated Daily Nutrient Needs:  Energy (kcal): 2393-4892(99-86 kcal/kg ABW); Weight Used for Energy Requirements:        Protein (g): 100(2 gm/kg IBW); Weight Used for Protein Requirements:  Ideal        Fluid (ml/day): 6755-5107 ( 1 ml/kcal); Weight Used for Fluid Requirements:  Current      Nutrition Related Findings: Pt continues with trace edema. no skin issues. Eating good (% of past 7 meals) Has IBS and is s/p CVA. Pt states she knows what she is supposed to do. Current Nutrition Therapies:    DIET CARDIAC; Anthropometric Measures:  · Height: 5' 2\" (157.5 cm)  · Current Body Wt: 226 lb (102.5 kg)   · BMI: 41.3    Nutrition Diagnosis:   · Food & Nutrition-related knowledge deficit related to cardiac dysfunction as evidenced by localized or generalized fluid accumulation, lab values    · Not ready for diet/lifestyle change related to cardiac dysfunction, excessive energy intake as evidenced by BMI, localized or generalized fluid accumulation, lab values      Nutrition Interventions:   Food and/or Nutrient Delivery:  Continue Current Diet  Nutrition Education/Counseling:  Education needed, Education initiated, Education completed   Coordination of Nutrition Care:  Continued Inpatient Monitoring, Interdisciplinary Rounds    Goals:  to meet est needs for age/condition and to provide information on prescribed diet.        Nutrition Monitoring and Evaluation:   Behavioral-Environmental Outcomes:  Readiness for Change   Food/Nutrient Intake Outcomes:  Food and Nutrient

## 2020-08-10 NOTE — PROGRESS NOTES
Physical Therapy  Facility/Department: [facility] MED SURG  Daily Treatment Note  NAME: Sukumar Simmons  : 1950  MRN: [id number]    Date of Service: 8/10/2020    Discharge Recommendations:  Continue to assess pending progress        Assessment   Body structures, Functions, Activity limitations: Decreased high-level IADLs;Decreased strength;Decreased functional mobility ; Decreased balance  Assessment: Patient completed B LE therex in sitting. SBA sit to stand. Ambulated 55 feet and 65 feet with RW and CGA/SBA. States she would like to go home. Discussed the benefits of staying here a few more days to gain more strength and functional mobility before trying to go back home alone. She was agreeable. Treatment Diagnosis: s/p CVA with left side weakness; decreased balance-unsteady gait; fall risk  Prognosis: Good  PT Education: PT Role;General Safety  REQUIRES PT FOLLOW UP: Yes  Activity Tolerance  Activity Tolerance: Patient Tolerated treatment well     Patient Diagnosis(es): There were no encounter diagnoses. has a past medical history of Anxiety and depression, Borderline diabetic, Cancer ([address] Cerebral artery occlusion with cerebral infarction ([address] and Hypertension. has a past surgical history that includes Hysterectomy () and Breast lumpectomy (). Restrictions  Restrictions/Precautions  Restrictions/Precautions: General Precautions, Fall Risk  Required Braces or Orthoses?: No  Subjective   General  Chart Reviewed: Yes  Response To Previous Treatment: Not applicable  Family / Caregiver Present: No  Referring Practitioner: Brayden Rosario MD  Subjective  Subjective: Patient states she is doing better today and wanted to know how long it will be before she can go home.   Pain Screening  Patient Currently in Pain: Denies  Vital Signs  Patient Currently in Pain: Denies       Orientation  Orientation  Overall Orientation Status: Within Normal Limits  Cognition      Objective      Transfers  Sit to Stand: Supervision  Stand to sit: Supervision  Ambulation  Ambulation?: Yes  Ambulation 1  Surface: level tile  Device: Rolling Walker  Assistance: Stand by assistance;Contact guard assistance  Quality of Gait: decreased active DF on left  Gait Deviations: Slow Ada;Decreased step length;Decreased step height  Distance: 55 feet and 65 feet     Balance  Posture: Good  Sitting - Static: Good  Sitting - Dynamic: Good  Standing - Static: Fair;+  Standing - Dynamic: Fair  Exercises  Hip Flexion: 2x15  Hip Abduction: 2x15  Knee Long Arc Quad: 2x15  Ankle Pumps: 2x15        Goals  Long term goals  Time Frame for Long term goals : 1-2 weeks  Long term goal 1: Achieve Good (-) static standing balance, and Fair dynamic standing balance. Long term goal 2: Achieve mod independence with basic transfers. Long term goal 3: Ambulate 150 feet x 2 with RW or cane with Supervision-mod Independent with good safety awareness. Plan    Plan  Times per week: 4-5 x week  Plan weeks: 1-2 weeks  Current Treatment Recommendations: Strengthening, Neuromuscular Re-education, Balance Training, Functional Mobility Training, Transfer Training, Gait Training, Safety Education & Training, Home Exercise Program  Safety Devices  Type of devices: Left in chair, Call light within reach     Therapy Time   Individual Concurrent Group Co-treatment   Time In 0908         Time Out [phone]         Minutes 46              This note serves as D/C summary if patient is discharged prior to next visit.   Margarita Dakin, PTA

## 2020-08-10 NOTE — FLOWSHEET NOTE
08/09/20 2225   Assessment   Charting Type Shift assessment   Neurological   Neuro (WDL) WDL   [place] Coma Scale   Eye Opening 4   Best Verbal Response 5   Best Motor Response 6   [place]ilio Coma Scale Score 15   HEENT   HEENT (WDL) X   Voice Normal   Mucous Membrane Moist   Teeth Partial plate lower   Respiratory   Respiratory (WDL) X   Respiratory Pattern Regular   Respiratory Depth Normal   Respiratory Quality/Effort Unlabored   Chest Assessment Chest expansion symmetrical   L Breath Sounds Clear;Diminished   R Breath Sounds Clear;Diminished   Breath Sounds   Right Upper Lobe Clear   Right Middle Lobe Diminished   Right Lower Lobe Diminished   Left Upper Lobe Clear;Diminished   Left Lower Lobe Clear;Diminished   Cardiac   Cardiac (WDL) WDL   Cardiac Monitor   Telemetry Monitor On No   Gastrointestinal   Abdominal (WDL) X   GI Symptoms Flatus   Abdomen Inspection Soft   Tenderness Soft;Nontender   RUQ Bowel Sounds Active   LUQ Bowel Sounds Active   RLQ Bowel Sounds Active   LLQ Bowel Sounds Active   Peripheral Vascular   Peripheral Vascular (WDL) X   Edema Right lower extremity; Left lower extremity   RLE Edema Trace   LLE Edema Trace   RUE Neurovascular Assessment   Capillary Refill Less than/equal to 3 seconds   Color Pink   LUE Neurovascular Assessment   Capillary Refill Less than/equal to 3 seconds   Color [place]   RLE Neurovascular Assessment   Capillary Refill Less than/equal to 3 seconds   Color [place]   LLE Neurovascular Assessment   Capillary Refill Less than/equal to 3 seconds   Color Pink   Skin Color/Condition   Skin Color/Condition (WDL) WDL   Skin Integrity   Skin Integrity (WDL) WDL   Musculoskeletal   Musculoskeletal (WDL) X   RUE Full movement   LUE Full movement;Weakness   RL Extremity Full movement   LL Extremity Full movement;Weakness; Unsteady   Genitourinary   Genitourinary (WDL) WDL   Urine Assessment   Incontinence No   Psychosocial   Psychosocial (WDL) WDL

## 2020-08-10 NOTE — PLAN OF CARE
Problem: Falls - Risk of:  Goal: Will remain free from falls  Description: Will remain free from falls  8/10/2020 1018 by Alie Roberson RN  Outcome: Met This Shift  8/9/2020 2243 by Joselito Cowan RN  Outcome: Ongoing  Goal: Absence of physical injury  Description: Absence of physical injury  8/10/2020 1018 by Alie Roberson RN  Outcome: Met This Shift  8/9/2020 2243 by Joselito Cowan RN  Outcome: Ongoing     Problem: Musculor/Skeletal Functional Status  Goal: Absence of falls  Outcome: Met This Shift     Problem: Musculor/Skeletal Functional Status  Goal: Highest potential functional level  8/10/2020 1018 by Alie Roberson RN  Outcome: Ongoing  8/9/2020 2243 by Joselito Cowan RN  Outcome: Ongoing

## 2020-08-10 NOTE — PROGRESS NOTES
Speech Language Pathology  Facility/Department: [facility]   CLINICAL BEDSIDE SWALLOW EVALUATION    NAME: Rosaenn Leyva  : 1950  MRN: [id number]    ADMISSION DATE: 2020  ADMITTING DIAGNOSIS: has Declining functional status; CVA (cerebral vascular accident) ([facility] [address] Hypertension; Type 2 diabetes mellitus without complication, without long-term current use of insulin ([facility] [address] Anxiety; and Tobacco abuse on their problem list.  ONSET DATE: 2020    Recent Chest Xray/CT of Chest: N/A  Date of Eval: 8/10/2020  Evaluating Therapist: Ganesh Rodriguez    Current Diet level:  Current Diet : Regular  Current Liquid Diet : Thin    Primary Complaint  Patient Complaint: Patient reported \"I had a stroke\" but denies difficulty chewing or swallowing. Reports left sided weakness with arms and legs and needs assist with walker. Patient also reported having difficulty with speech immediately following CVA, however, reported it has resolved to baseline. Pain:  Pain Assessment  Pain Assessment: 0-10  Pain Level: 0    Reason for Referral  Roseann Leyva was referred for a bedside swallow evaluation to assess the efficiency of her swallow function, identify signs and symptoms of aspiration and make recommendations regarding safe dietary consistencies, effective compensatory strategies, and safe eating environment. Impression  Dysphagia Impression : No overt s/sx dysphagia indicated  Dysphagia Outcome Severity Scale: Level 7: Normal in all situations     Treatment Plan  Requires SLP Intervention: No  Duration/Frequency of Treatment: N/A Skilled ST services not indicated at this time. D/C Recommendations: To be determined     Recommended Diet and Intervention  Diet Solids Recommendation: Regular  Liquid Consistency Recommendation:  Thin  Recommended Form of Meds: PO     Compensatory Swallowing Strategies  Compensatory Swallowing Strategies: Small bites/sips;Upright as possible for all oral intake;Remain upright for 30-45 minutes after meals;Eat/Feed slowly    Treatment/Goals  Short-term Goals  Timeframe for Short-term Goals: N/A Skilled ST services not indicated at this time. Long-term Goals  Timeframe for Long-term Goals: N/A Skilled ST services not indicated at this time. General  Chart Reviewed: Yes  Subjective  Subjective: Patient upright in chair, pleasant and agreeable to ST eval. Patient denies difficulty chewing or swallowing. Behavior/Cognition: Alert; Cooperative;Pleasant mood  Communication Observation: Functional  Follows Directions: Complex  Dentition: Some missing teeth  Patient Positioning: Upright in chair  Baseline Vocal Quality: Normal  Volitional Cough: Strong  Prior Dysphagia History: None indicated or reported. Consistencies Administered: Reg solid; Thin - straw    Vision/Hearing  Vision  Vision: Impaired  Vision Exceptions: Wears glasses for reading  Hearing  Hearing: Within functional limits    Oral Motor Deficits  Oral/Motor  Oral Motor: Within functional limits    Oral Phase Dysfunction  Oral Phase  Oral Phase: WNL  Oral Phase  Oral Phase - Comment: No deficits indicated     Indicators of Pharyngeal Phase Dysfunction   Pharyngeal Phase  Pharyngeal Phase: WNL  Pharyngeal Phase   Pharyngeal: No deficits indicated    Prognosis  Good for recommended diet    Education  Patient Education: Aspiration precaution guidelines; safe swallow strategies.   Patient Education Response: Verbalizes understanding;Demonstrated understanding     Therapy Time  SLP Individual Minutes  Time In: 1200  Time Out: 8626  Minutes: Milagros [address] [place] [address]  8/10/2020 2:14 PM

## 2020-08-11 PROCEDURE — 1200000002 HC SEMI PRIVATE SWING BED

## 2020-08-11 PROCEDURE — 6370000000 HC RX 637 (ALT 250 FOR IP): Performed by: PHYSICIAN ASSISTANT

## 2020-08-11 PROCEDURE — 97110 THERAPEUTIC EXERCISES: CPT

## 2020-08-11 PROCEDURE — 6360000002 HC RX W HCPCS: Performed by: PHYSICIAN ASSISTANT

## 2020-08-11 PROCEDURE — 97116 GAIT TRAINING THERAPY: CPT

## 2020-08-11 PROCEDURE — 97530 THERAPEUTIC ACTIVITIES: CPT

## 2020-08-11 RX ADMIN — CLONIDINE HYDROCHLORIDE 0.1 MG: 0.1 TABLET ORAL at 19:56

## 2020-08-11 RX ADMIN — ASPIRIN 81 MG: 81 TABLET, CHEWABLE ORAL at 08:20

## 2020-08-11 RX ADMIN — HYDROXYZINE HYDROCHLORIDE 25 MG: 25 TABLET, FILM COATED ORAL at 08:24

## 2020-08-11 RX ADMIN — LOSARTAN POTASSIUM 100 MG: 50 TABLET ORAL at 08:20

## 2020-08-11 RX ADMIN — ENOXAPARIN SODIUM 40 MG: 40 INJECTION SUBCUTANEOUS at 08:20

## 2020-08-11 RX ADMIN — HYDROXYZINE HYDROCHLORIDE 25 MG: 25 TABLET, FILM COATED ORAL at 19:56

## 2020-08-11 RX ADMIN — CARVEDILOL 25 MG: 25 TABLET, FILM COATED ORAL at 08:20

## 2020-08-11 RX ADMIN — CARVEDILOL 25 MG: 25 TABLET, FILM COATED ORAL at 18:02

## 2020-08-11 RX ADMIN — ATORVASTATIN CALCIUM 40 MG: 40 TABLET, FILM COATED ORAL at 08:20

## 2020-08-11 RX ADMIN — AMLODIPINE BESYLATE 10 MG: 5 TABLET ORAL at 08:20

## 2020-08-11 RX ADMIN — FAMOTIDINE 20 MG: 20 TABLET, FILM COATED ORAL at 08:20

## 2020-08-11 RX ADMIN — CLONIDINE HYDROCHLORIDE 0.1 MG: 0.1 TABLET ORAL at 08:20

## 2020-08-11 RX ADMIN — FAMOTIDINE 20 MG: 20 TABLET, FILM COATED ORAL at 19:56

## 2020-08-11 ASSESSMENT — PAIN SCALES - GENERAL
PAINLEVEL_OUTOF10: 0

## 2020-08-11 NOTE — PLAN OF CARE
Problem: Falls - Risk of:  Goal: Will remain free from falls  Description: Will remain free from falls  Outcome: Ongoing  Goal: Absence of physical injury  Description: Absence of physical injury  Outcome: Ongoing     Problem: Musculor/Skeletal Functional Status  Goal: Highest potential functional level  8/11/2020 0829 by Mani Peter RN  Outcome: Ongoing  8/10/2020 2109 by Melissa Gu RN  Outcome: Ongoing  Goal: Absence of falls  Outcome: Ongoing     Problem: Anxiety:  Goal: Level of anxiety will decrease  Description: Level of anxiety will decrease  8/11/2020 0829 by Mani Peter RN  Outcome: Ongoing  8/10/2020 2110 by Melissa Gu RN  Outcome: Ongoing

## 2020-08-11 NOTE — FLOWSHEET NOTE
08/11/20 0831   Assessment   Charting Type Shift assessment   Neurological   Neuro (WDL) WDL   [place] Coma Scale   Eye Opening 4   Best Verbal Response 5   Best Motor Response 6   [place]ilio Coma Scale Score 15   HEENT   HEENT (WDL) X   Voice Normal   Mucous Membrane Moist   Teeth Partial plate lower   Respiratory   Respiratory (WDL) X   Respiratory Pattern Regular   Respiratory Depth Normal   Respiratory Quality/Effort Unlabored   Chest Assessment Chest expansion symmetrical   L Breath Sounds Clear;Diminished   R Breath Sounds Clear;Diminished   Breath Sounds   Right Upper Lobe Clear   Right Middle Lobe Clear   Right Lower Lobe Diminished;Clear   Left Upper Lobe Clear   Left Lower Lobe Clear;Diminished   Cough and Deep Breathe   Cough and Deep Breathe Yes   Cardiac   Cardiac (WDL) WDL   Cardiac Monitor   Telemetry Monitor On No   Gastrointestinal   Abdominal (WDL) X   GI Symptoms Flatus   Abdomen Inspection Soft   Tenderness Soft;Nontender   RUQ Bowel Sounds Active   LUQ Bowel Sounds Active   RLQ Bowel Sounds Active   LLQ Bowel Sounds Active   Peripheral Vascular   Peripheral Vascular (WDL) X   Edema Right lower extremity; Left lower extremity   RLE Edema Trace   LLE Edema Trace   RUE Neurovascular Assessment   Capillary Refill Less than/equal to 3 seconds   Color Pink   R Radial Pulse +2   LUE Neurovascular Assessment   Capillary Refill Less than/equal to 3 seconds   Color Pink   L Radial Pulse +2   RLE Neurovascular Assessment   Capillary Refill Less than/equal to 3 seconds   Color Pink   R Pedal Pulse +2   LLE Neurovascular Assessment   Capillary Refill Less than/equal to 3 seconds   Color Pink   L Pedal Pulse +2   Skin Color/Condition   Skin Color/Condition (WDL) WDL   Skin Integrity   Skin Integrity (WDL) WDL   Musculoskeletal   Musculoskeletal (WDL) X   RUE Full movement   LUE Full movement;Weakness   RL Extremity Full movement   LL Extremity Full movement;Weakness; Unsteady   Genitourinary   Genitourinary (WDL) WDL   Urine Assessment   Incontinence No   Anus/Rectum   Anus/Rectum (WDL) WDL   Psychosocial   Psychosocial (WDL) WDL   Pt awake in bed. Pt alert and orietned. Pt appears in no acute distress. Pt currently on RA. Pt lung sounds clear with diminished sounds in bases benny. Pt encouraged to cough and deep breathe. Pt educated on home health. Pt requesting anxiety medication, see eMAR for intervention. Pt call bell and bedside table within reach. Will continue to monitor pt.

## 2020-08-11 NOTE — PROGRESS NOTES
Physical Therapy  Facility/Department: [facility] MED SURG  Daily Treatment Note  NAME: Dino Garnett  : 1950  MRN: [id number]    Date of Service: 2020    Discharge Recommendations:  Continue to assess pending progress        Assessment   Body structures, Functions, Activity limitations: Decreased high-level IADLs;Decreased strength;Decreased functional mobility ; Decreased balance  Assessment: Patient completed B LE therex in sitting. Ambulated 75 feet with RW and SBA. Treatment Diagnosis: s/p CVA with left side weakness; decreased balance-unsteady gait; fall risk  Prognosis: Good  PT Education: General Safety  REQUIRES PT FOLLOW UP: Yes  Activity Tolerance  Activity Tolerance: Patient Tolerated treatment well     Patient Diagnosis(es): There were no encounter diagnoses. has a past medical history of Anxiety and depression, Borderline diabetic, Cancer ([facility] [address] Cerebral artery occlusion with cerebral infarction ([facility] [address] and Hypertension. has a past surgical history that includes Hysterectomy () and Breast lumpectomy (). Restrictions  Restrictions/Precautions  Restrictions/Precautions: General Precautions, Fall Risk  Required Braces or Orthoses?: No  Subjective   General  Chart Reviewed: Yes  Response To Previous Treatment: Patient with no complaints from previous session.   Family / Caregiver Present: No  Referring Practitioner: Helio Santos MD  Subjective  Subjective: Patient states she is doing ok and is agreeable to PT  Pain Screening  Patient Currently in Pain: Denies  Vital Signs  Patient Currently in Pain: Denies       Orientation  Orientation  Overall Orientation Status: Within Normal Limits  Cognition      Objective      Transfers  Sit to Stand: Modified independent  Stand to sit: Modified independent  Ambulation  Ambulation?: Yes  Ambulation 1  Surface: level tile  Device: Rolling Walker  Assistance: Stand by assistance  Quality of Gait: decreased active DF on left  Gait Deviations: Slow Ada;Decreased step length;Decreased step height  Distance: 75 feet     Balance  Posture: Good  Sitting - Static: Good  Sitting - Dynamic: Good  Standing - Static: Good;-  Standing - Dynamic: Fair;+  Exercises  Hip Flexion: 2x15  Hip Abduction: 2x15  Knee Long Arc Quad: 2x15  Ankle Pumps: 2x15        Goals  Long term goals  Time Frame for Long term goals : 1-2 weeks  Long term goal 1: Achieve Good (-) static standing balance, and Fair dynamic standing balance. Long term goal 2: Achieve mod independence with basic transfers. Long term goal 3: Ambulate 150 feet x 2 with RW or cane with Supervision-mod Independent with good safety awareness. Plan    Plan  Times per week: 4-5 x week  Plan weeks: 1-2 weeks  Current Treatment Recommendations: Strengthening, Neuromuscular Re-education, Balance Training, Functional Mobility Training, Transfer Training, Gait Training, Safety Education & Training, Home Exercise Program  Safety Devices  Type of devices: Left in chair, Call light within reach     Therapy Time   Individual Concurrent Group Co-treatment   Time In 0926         Time Out 3343         Minutes 26               This note serves as D/C summary if patient is discharged prior to next visit.   Mary Ann Patel, PTA

## 2020-08-11 NOTE — PROGRESS NOTES
Occupational Therapy  Facility/Department: [address] [place] [address] MED SURG  Daily Treatment Note  NAME: Rosie Pisano  : 1950  MRN: [id number]    Date of Service: 2020    Discharge Recommendations:  Home with Home health OT, Outpatient OT       Assessment   Assessment: Pt agreeable to OT services. Pt completed BUE ther ex using orange theraband with increase to x15 reps seated in chair. Pt completed thera putty (green) x20 reps for each movement. Pt tolerated increased reps of thera putty well on this date. Pt demo improved fine motor coordination skills when threading small beads onto  with minimal (1) droppage. Pt completed twisting/torquing motion using nuts and bolts activity with ease. Pt continues to show overall improvement in fine motor coordination skills. Pt left in chair with call light in reach. Patient Diagnosis(es): There were no encounter diagnoses. has a past medical history of Anxiety and depression, Borderline diabetic, Cancer ([facility] [address] Cerebral artery occlusion with cerebral infarction ([facility] [address] and Hypertension. has a past surgical history that includes Hysterectomy () and Breast lumpectomy (). Restrictions  Restrictions/Precautions  Restrictions/Precautions: General Precautions, Fall Risk  Required Braces or Orthoses?: No  Subjective   General  Chart Reviewed: Yes  Patient assessed for rehabilitation services?: Yes  Family / Caregiver Present: No  Referring Practitioner: Joy Keller PA-C  Diagnosis: CVA, HTN, Functional decline  Subjective  Subjective: Pt reports  she was having HTN and felt off. Pt called EMS and had elevated BP. Pt had MRI revealing CVA.  Pt transferred to [facility] program.      Orientation     Objective       Type of ROM/Therapeutic Exercise  Comment: Orange theraband  Exercises  Scapular Retraction: x15  Shoulder Flexion: x15  Shoulder Extension: x15  Horizontal ABduction: x15  Elbow Flexion: x15  Elbow Extension: x15  Other: Fine motor coordination activities including green putty  x20 , tip pinch x20, digit extension x20, finger adduction x20, in hand manipulation skills threading small beads, twist/torquing motion doing nuts/bolts                    Plan   Plan  Times per week: 3-5  Times per day: Daily  Plan weeks: 2  Current Treatment Recommendations: Balance Training, Self-Care / ADL, Neuromuscular Re-education, Safety Education & Training, Endurance Training, Patient/Caregiver Education & Training    Goals  Short term goals  Time Frame for Short term goals: 1 week  Short term goal 1: Pt to complete hygiene/grooming with MOD I using LUE. Short term goal 2: Pt to complete bathing demonstrating good safety with SUP using LUE. Short term goal 3: Pt to complete dressing with GREY. Short term goal 4: Pt to improve 9 hole peg test of LUE 20 seconds (61) to improve independence with dressing and leisure tasks. Short term goal 5: Pt to improve independence with self feeding using LUE with no spillage to MOD I. Long term goals  Time Frame for Long term goals : 2 weeks  Long term goal 1: Pt to complete bathing with MOD I using LUE for BUE coordination  Long term goal 2: Pt to complete dressing with MOD I. Long term goal 3: Pt to complete functional reaching with good gross coordination with no spillage of items. Long term goal 4: Pt to complete 9 hole peg test improving LUE score to 40 seconds to improve independence with ADL's and meal prep. Long term goal 5: Pt to complete meal prep with MOD I. Patient Goals   Patient goals : Be able to complete crafts again. Therapy Time   Individual Concurrent Group Co-treatment   Time In 0330         Time Out 3335         Minutes 42              This note serves as a DC summary in the event of pt discharge.      Maddie Fan, OTR/L

## 2020-08-11 NOTE — FLOWSHEET NOTE
08/10/20 2100   Assessment   Charting Type Shift assessment   Neurological   Neuro (WDL) WDL   Swallow Screening   Is the patient able to remain alert for testing? Yes   Abilio Coma Scale   Eye Opening 4   Best Verbal Response 5   Best Motor Response 6   Abilio Coma Scale Score 15   HEENT   HEENT (WDL) X   Voice Normal   Mucous Membrane Moist   Teeth Partial plate lower   Respiratory   Respiratory (WDL) X   Respiratory Pattern Regular   Respiratory Depth Normal   Respiratory Quality/Effort Unlabored   Chest Assessment Chest expansion symmetrical   L Breath Sounds Clear;Diminished   R Breath Sounds Clear;Diminished   Breath Sounds   Right Upper Lobe Clear   Right Middle Lobe Diminished   Right Lower Lobe Diminished   Left Upper Lobe Clear;Diminished   Left Lower Lobe Clear;Diminished   Cough and Deep Breathe   Cough and Deep Breathe Yes   Cardiac   Cardiac (WDL) WDL   Cardiac Monitor   Telemetry Monitor On No   Gastrointestinal   Abdominal (WDL) X   GI Symptoms Flatus   Abdomen Inspection Soft   Tenderness Soft;Nontender   RUQ Bowel Sounds Active   LUQ Bowel Sounds Active   RLQ Bowel Sounds Active   LLQ Bowel Sounds Active   Peripheral Vascular   Peripheral Vascular (WDL) X   Edema Right lower extremity; Left lower extremity   RLE Edema Trace   LLE Edema Trace   RUE Neurovascular Assessment   Capillary Refill Less than/equal to 3 seconds   Color Pink   R Radial Pulse +2   LUE Neurovascular Assessment   Capillary Refill Less than/equal to 3 seconds   Color Pink   L Radial Pulse +2   RLE Neurovascular Assessment   Capillary Refill Less than/equal to 3 seconds   Color Pink   R Pedal Pulse +2   LLE Neurovascular Assessment   Capillary Refill Less than/equal to 3 seconds   Color Pink   L Pedal Pulse +2   Skin Color/Condition   Skin Color/Condition (WDL) WDL   Skin Integrity   Skin Integrity (WDL) WDL   Musculoskeletal   Musculoskeletal (WDL) X   RUE Full movement   LUE Full movement;Weakness   RL Extremity Full movement   LL Extremity Full movement;Weakness; Unsteady   Genitourinary   Genitourinary (WDL) WDL   Urine Assessment   Incontinence No   Anus/Rectum   Anus/Rectum (WDL) WDL   Psychosocial   Psychosocial (WDL) WDL   Pt.  Alert times 4 this pm-Pt able to take pm medications well per MAR-Blood pressure elevated all anxiety and blood pressure medications given-Call bell within reach-Will monitor

## 2020-08-11 NOTE — PLAN OF CARE
Problem: Musculor/Skeletal Functional Status  Goal: Highest potential functional level  8/10/2020 2109 by Summer Gonzales RN  Outcome: Ongoing  8/10/2020 1018 by Fabián Cervantes RN  Outcome: Ongoing     Problem: Falls - Risk of:  Goal: Absence of physical injury  Description: Absence of physical injury  8/10/2020 1018 by Fabián Cervantes RN  Outcome: Met This Shift

## 2020-08-11 NOTE — PROGRESS NOTES
[address] [place] [address] Med Surg  [address] [facility] [address]  Phone: [phone]             August 11, 2020    Patient: Lindsey Cotton   YOB: 1950       To Whom It May Concern:    Lindsey Cotton is requiring her mailbox to be moved to her door to avoid distance walking due to her medical condition. If you have any questions or concerns, don't hesitate to reach out to the care coordinator at the hospital:  [address] [facility], [address]       Thank you,     Stefanie Avila MD

## 2020-08-12 PROCEDURE — 6370000000 HC RX 637 (ALT 250 FOR IP): Performed by: PHYSICIAN ASSISTANT

## 2020-08-12 PROCEDURE — 6360000002 HC RX W HCPCS: Performed by: PHYSICIAN ASSISTANT

## 2020-08-12 PROCEDURE — 1200000002 HC SEMI PRIVATE SWING BED

## 2020-08-12 RX ORDER — CLONIDINE HYDROCHLORIDE 0.1 MG/1
0.1 TABLET ORAL 3 TIMES DAILY
Status: DISCONTINUED | OUTPATIENT
Start: 2020-08-12 | End: 2020-08-13 | Stop reason: HOSPADM

## 2020-08-12 RX ADMIN — ASPIRIN 81 MG: 81 TABLET, CHEWABLE ORAL at 08:23

## 2020-08-12 RX ADMIN — CLONIDINE HYDROCHLORIDE 0.1 MG: 0.1 TABLET ORAL at 13:05

## 2020-08-12 RX ADMIN — CLONIDINE HYDROCHLORIDE 0.1 MG: 0.1 TABLET ORAL at 21:06

## 2020-08-12 RX ADMIN — AMLODIPINE BESYLATE 10 MG: 5 TABLET ORAL at 08:23

## 2020-08-12 RX ADMIN — ENOXAPARIN SODIUM 40 MG: 40 INJECTION SUBCUTANEOUS at 08:23

## 2020-08-12 RX ADMIN — HYDROXYZINE HYDROCHLORIDE 25 MG: 25 TABLET, FILM COATED ORAL at 21:06

## 2020-08-12 RX ADMIN — ATORVASTATIN CALCIUM 40 MG: 40 TABLET, FILM COATED ORAL at 08:22

## 2020-08-12 RX ADMIN — CLONIDINE HYDROCHLORIDE 0.1 MG: 0.1 TABLET ORAL at 08:23

## 2020-08-12 RX ADMIN — HYDROXYZINE HYDROCHLORIDE 25 MG: 25 TABLET, FILM COATED ORAL at 08:28

## 2020-08-12 RX ADMIN — FAMOTIDINE 20 MG: 20 TABLET, FILM COATED ORAL at 08:23

## 2020-08-12 RX ADMIN — FAMOTIDINE 20 MG: 20 TABLET, FILM COATED ORAL at 21:06

## 2020-08-12 RX ADMIN — CARVEDILOL 25 MG: 25 TABLET, FILM COATED ORAL at 16:14

## 2020-08-12 RX ADMIN — CARVEDILOL 25 MG: 25 TABLET, FILM COATED ORAL at 08:23

## 2020-08-12 RX ADMIN — LOSARTAN POTASSIUM 100 MG: 50 TABLET ORAL at 08:23

## 2020-08-12 ASSESSMENT — PAIN SCALES - GENERAL
PAINLEVEL_OUTOF10: 0

## 2020-08-12 NOTE — PLAN OF CARE
Problem: Falls - Risk of:  Goal: Will remain free from falls  Description: Will remain free from falls  8/12/2020 6369 by Jenny Montague RN  Outcome: Ongoing  8/11/2020 2032 by Kim John RN  Outcome: Ongoing  Goal: Absence of physical injury  Description: Absence of physical injury  Outcome: Ongoing     Problem: Musculor/Skeletal Functional Status  Goal: Highest potential functional level  8/11/2020 2032 by Kim John RN  Outcome: Ongoing  Goal: Absence of falls  Outcome: Ongoing     Problem: Anxiety:  Goal: Level of anxiety will decrease  Description: Level of anxiety will decrease  Outcome: Ongoing

## 2020-08-12 NOTE — CARE COORDINATION
Interdisciplinary rounding completed. Anais Young (provider rep), Jaime Juarez (), Rose Olivera (nursing), Jenni (PT), Maddie (OT), Billy (pharmacy), Lucio Lewis (Speech), and Emmy (dietitian) all involved. Activities reviewed with OT.      lives alone. No DME at home. Will need HH at home. Needs RW, BSC cleared for DC with walker and BSC and HH. Transfers  Sit to Stand: Modified independent  Stand to sit: Modified independent  Ambulation  Ambulation?: Yes  Ambulation 1  Surface: level tile  Device: Rolling Walker  Assistance: Stand by assistance  Quality of Gait: decreased active DF on left  Gait Deviations: Slow Ada;Decreased step length;Decreased step height  Distance: 75 feet   Pt demo improved fine motor coordination skills when threading small beads onto  with minimal (1) droppage. Pt completed twisting/torquing motion using nuts and bolts activity with ease. Pt continues to show overall improvement in fine motor coordination skills. Cleared by speech therapy. Cardiac diet. Good PO intake No current antibiotics. Lovenox 40mg subq daily for VTE prophylaxis. plan DC to home in AM with DME and HH       Letter given to pt to assist in getting mailbox moved to doorway. Pt does not want rollator- only RW. Not sure about BSC so I told her it was recommended and we will order it and she call for it if she wants when she gets home and they will bring it to her home. The Plan for Transition of Care is related to the following treatment goals: home with [facility]    The Patient was provided with a choice of provider and agrees with the discharge plan. [x] Yes [] No    Freedom of choice list was provided with basic dialogue that supports the patient's individualized plan of care/goals, treatment preferences and shares the quality data associated with the providers.  [x] Yes [] No    [facility] ordered as they are in network and can offer an aide per pt request.  Plan to DC to home in AM.  Called to have notary come and notarize living will. No further needs per pt.

## 2020-08-12 NOTE — PLAN OF CARE
Problem: Musculor/Skeletal Functional Status  Goal: Highest potential functional level  8/11/2020 2032 by Alena Washburn RN  Outcome: Ongoing  8/11/2020 0829 by Benson Gavin RN  Outcome: Ongoing     Problem: Falls - Risk of:  Goal: Will remain free from falls  Description: Will remain free from falls  8/11/2020 2032 by Alena Washburn RN  Outcome: Ongoing  8/11/2020 0829 by Benson Gavin RN  Outcome: Ongoing

## 2020-08-12 NOTE — PROGRESS NOTES
Placed call to [facility] for appointment for patient. No Answer.  Left VM

## 2020-08-12 NOTE — FLOWSHEET NOTE
Genitourinary   Genitourinary (WDL) WDL   Urine Assessment   Incontinence No   Anus/Rectum   Anus/Rectum (WDL) WDL   Psychosocial   Psychosocial (WDL) WDL   Pt awake in bed. Pt alert and oriented. Pt appears in no acute distress. Pt currently on RA. Pt lung sounds clear with diminished sounds in bases benny. Pt encouraged to cough and deep breathe. Pt requesting anxiety medication, see eMAR for intervention. Pt call bell and bedside table within reach. Will continue to monitor pt.

## 2020-08-12 NOTE — FLOWSHEET NOTE
08/11/20 1953   Vital Signs   Temp 97.6 °F (36.4 °C)   Temp Source Temporal   Pulse 59   Heart Rate Source Monitor   Resp 18   BP (!) 151/78   BP Location Left Arm   BP Upper/Lower Upper   MAP (mmHg) 94   Oxygen Therapy   SpO2 96 %   O2 Device None (Room air)

## 2020-08-12 NOTE — CARE COORDINATION
Attempted to complete OT services on this date. Assisted pt from toileting and pt declined to participate. Pt reports, \" I am too tired today, I think I done too much yesterday. \" Pt reminded of upcoming planned DC home and encouraged participation however, pt continued to decline.

## 2020-08-12 NOTE — FLOWSHEET NOTE
08/11/20 2000   Assessment   Charting Type Shift assessment   Neurological   Neuro (WDL) WDL   Swallow Screening   Is the patient able to remain alert for testing? Yes   Was the Patient Eating a Modified Diet Prior to being Admitted? No   Abilio Coma Scale   Eye Opening 4   Best Verbal Response 5   Best Motor Response 6   Abilio Coma Scale Score 15   HEENT   HEENT (WDL) X   Voice Normal   Mucous Membrane Moist   Teeth Partial plate lower   Respiratory   Respiratory (WDL) X   Respiratory Pattern Regular   Respiratory Depth Normal   Respiratory Quality/Effort Unlabored   Chest Assessment Chest expansion symmetrical   L Breath Sounds Clear;Diminished   R Breath Sounds Clear;Diminished   Breath Sounds   Right Upper Lobe Clear   Right Middle Lobe Clear   Right Lower Lobe Diminished;Clear   Left Upper Lobe Clear   Left Lower Lobe Clear;Diminished   Cough and Deep Breathe   Cough and Deep Breathe Yes   Cardiac   Cardiac (WDL) X  (NSR -[address] this pm)   Cardiac Monitor   Telemetry Monitor On No   Gastrointestinal   Abdominal (WDL) X   GI Symptoms Flatus   Abdomen Inspection Soft   Tenderness Soft;Nontender   RUQ Bowel Sounds Active   LUQ Bowel Sounds Active   RLQ Bowel Sounds Active   LLQ Bowel Sounds Active   Peripheral Vascular   Peripheral Vascular (WDL) X   Edema Right lower extremity; Left lower extremity   RLE Edema Trace   LLE Edema Trace   RUE Neurovascular Assessment   Capillary Refill Less than/equal to 3 seconds   Color Pink   R Radial Pulse +2   LUE Neurovascular Assessment   Capillary Refill Less than/equal to 3 seconds   Color Pink   L Radial Pulse +2   RLE Neurovascular Assessment   Capillary Refill Less than/equal to 3 seconds   Color Pink   R Pedal Pulse +2   LLE Neurovascular Assessment   Capillary Refill Less than/equal to 3 seconds   Color Pink   L Pedal Pulse +2   Skin Color/Condition   Skin Color/Condition (WDL) WDL   Skin Integrity   Skin Integrity (WDL) WDL   Musculoskeletal Musculoskeletal (WDL) X   RUE Full movement   LUE Full movement;Weakness   RL Extremity Full movement   LL Extremity Full movement;Weakness; Unsteady   Genitourinary   Genitourinary (WDL) WDL   Urine Assessment   Incontinence No   Anus/Rectum   Anus/Rectum (WDL) WDL   Psychosocial   Psychosocial (WDL) WDL   Pt.  Alert times 4 this pm-Pt able to take pm medications well per [facility]- No complaints of pain- Nicotine patch removed and new patch placed on right arm- Call bell within reach-Will monitor

## 2020-08-13 ENCOUNTER — TELEPHONE (OUTPATIENT)
Dept: INTERNAL MEDICINE | Facility: CLINIC | Age: 70
End: 2020-08-13

## 2020-08-13 ENCOUNTER — READMISSION MANAGEMENT (OUTPATIENT)
Dept: CALL CENTER | Facility: HOSPITAL | Age: 70
End: 2020-08-13

## 2020-08-13 VITALS
DIASTOLIC BLOOD PRESSURE: 70 MMHG | HEART RATE: 65 BPM | BODY MASS INDEX: 40.89 KG/M2 | TEMPERATURE: 97.8 F | SYSTOLIC BLOOD PRESSURE: 140 MMHG | HEIGHT: 62 IN | OXYGEN SATURATION: 94 % | WEIGHT: 222.2 LBS | RESPIRATION RATE: 20 BRPM

## 2020-08-13 LAB
ANION GAP SERPL CALCULATED.3IONS-SCNC: 9 MMOL/L (ref 3–16)
BUN BLDV-MCNC: 12 MG/DL (ref 6–20)
CALCIUM SERPL-MCNC: 9.6 MG/DL (ref 8.5–10.5)
CHLORIDE BLD-SCNC: 103 MMOL/L (ref 98–107)
CO2: 28 MMOL/L (ref 20–30)
CREAT SERPL-MCNC: 0.6 MG/DL (ref 0.4–1.2)
GFR AFRICAN AMERICAN: >59
GFR NON-AFRICAN AMERICAN: >60
GLUCOSE BLD-MCNC: 155 MG/DL (ref 74–106)
POTASSIUM SERPL-SCNC: 3.6 MMOL/L (ref 3.4–5.1)
SODIUM BLD-SCNC: 140 MMOL/L (ref 136–145)
VITAMIN D 25-HYDROXY: 27.4 (ref 32–100)

## 2020-08-13 PROCEDURE — 97110 THERAPEUTIC EXERCISES: CPT

## 2020-08-13 PROCEDURE — 82306 VITAMIN D 25 HYDROXY: CPT

## 2020-08-13 PROCEDURE — 99315 NF DSCHRG MGMT 30 MIN/LESS: CPT | Performed by: INTERNAL MEDICINE

## 2020-08-13 PROCEDURE — 80048 BASIC METABOLIC PNL TOTAL CA: CPT

## 2020-08-13 PROCEDURE — 6360000002 HC RX W HCPCS: Performed by: PHYSICIAN ASSISTANT

## 2020-08-13 PROCEDURE — 6360000002 HC RX W HCPCS: Performed by: INTERNAL MEDICINE

## 2020-08-13 PROCEDURE — 6370000000 HC RX 637 (ALT 250 FOR IP): Performed by: PHYSICIAN ASSISTANT

## 2020-08-13 PROCEDURE — 97116 GAIT TRAINING THERAPY: CPT

## 2020-08-13 PROCEDURE — 36415 COLL VENOUS BLD VENIPUNCTURE: CPT

## 2020-08-13 PROCEDURE — 97530 THERAPEUTIC ACTIVITIES: CPT

## 2020-08-13 RX ORDER — DOXAZOSIN MESYLATE 4 MG/1
4 TABLET ORAL DAILY
Qty: 30 TABLET | Refills: 3 | Status: SHIPPED | OUTPATIENT
Start: 2020-08-13

## 2020-08-13 RX ORDER — CYANOCOBALAMIN 1000 UG/ML
1000 INJECTION INTRAMUSCULAR; SUBCUTANEOUS ONCE
Status: COMPLETED | OUTPATIENT
Start: 2020-08-13 | End: 2020-08-13

## 2020-08-13 RX ORDER — DOXAZOSIN MESYLATE 4 MG/1
4 TABLET ORAL DAILY
Status: DISCONTINUED | OUTPATIENT
Start: 2020-08-13 | End: 2020-08-13 | Stop reason: HOSPADM

## 2020-08-13 RX ORDER — CLONIDINE HYDROCHLORIDE 0.1 MG/1
0.1 TABLET ORAL 3 TIMES DAILY
Qty: 60 TABLET | Refills: 3 | Status: SHIPPED | OUTPATIENT
Start: 2020-08-13

## 2020-08-13 RX ORDER — NICOTINE 21 MG/24HR
1 PATCH, TRANSDERMAL 24 HOURS TRANSDERMAL EVERY 24 HOURS
Qty: 30 PATCH | Refills: 3 | Status: SHIPPED | OUTPATIENT
Start: 2020-08-13 | End: 2021-08-13

## 2020-08-13 RX ORDER — LOSARTAN POTASSIUM 100 MG/1
100 TABLET ORAL DAILY
Qty: 30 TABLET | Refills: 3 | Status: SHIPPED | OUTPATIENT
Start: 2020-08-14

## 2020-08-13 RX ADMIN — CYANOCOBALAMIN 1000 MCG: 1000 INJECTION, SOLUTION INTRAMUSCULAR; SUBCUTANEOUS at 09:01

## 2020-08-13 RX ADMIN — ASPIRIN 81 MG: 81 TABLET, CHEWABLE ORAL at 09:02

## 2020-08-13 RX ADMIN — ENOXAPARIN SODIUM 40 MG: 40 INJECTION SUBCUTANEOUS at 09:02

## 2020-08-13 RX ADMIN — ATORVASTATIN CALCIUM 40 MG: 40 TABLET, FILM COATED ORAL at 09:02

## 2020-08-13 RX ADMIN — CLONIDINE HYDROCHLORIDE 0.1 MG: 0.1 TABLET ORAL at 09:01

## 2020-08-13 RX ADMIN — CARVEDILOL 25 MG: 25 TABLET, FILM COATED ORAL at 09:02

## 2020-08-13 RX ADMIN — AMLODIPINE BESYLATE 10 MG: 5 TABLET ORAL at 09:02

## 2020-08-13 RX ADMIN — HYDROXYZINE HYDROCHLORIDE 25 MG: 25 TABLET, FILM COATED ORAL at 09:10

## 2020-08-13 RX ADMIN — FAMOTIDINE 20 MG: 20 TABLET, FILM COATED ORAL at 09:01

## 2020-08-13 RX ADMIN — LOSARTAN POTASSIUM 100 MG: 50 TABLET ORAL at 09:01

## 2020-08-13 RX ADMIN — DOXAZOSIN 4 MG: 4 TABLET ORAL at 11:44

## 2020-08-13 NOTE — PROGRESS NOTES
Occupational Therapy  Facility/Department: [address] [place] [address] MED SURG  Daily Treatment Note  NAME: Francisco Hill  : 1950  MRN: [id number]    Date of Service: 2020    Discharge Recommendations:  Home with Home health OT, Outpatient OT       Assessment   Assessment: Pt agreeable to OT services. Pt completed BUE ther ex using orange theraband x10 reps seated in chair. Pt completed green theraputty exercises x15 reps. Pt demo significant improvement in fine motor coordination evidence by 9 Hole Peg Test. Pt engaged in fine motor activity with focus on in hand manipulation and tip pinch demo minimal (1) droppage and with ease. Pt left in recliner with call light in reach and sister present. Activity Tolerance  Activity Tolerance: Patient Tolerated treatment well         Patient Diagnosis(es): There were no encounter diagnoses. has a past medical history of Anxiety and depression, Borderline diabetic, Cancer ([place] [address] Cerebral artery occlusion with cerebral infarction ([facility] [address] and Hypertension. has a past surgical history that includes Hysterectomy () and Breast lumpectomy (). Restrictions  Restrictions/Precautions  Restrictions/Precautions: General Precautions, Fall Risk  Required Braces or Orthoses?: No  Subjective   General  Chart Reviewed: Yes  Patient assessed for rehabilitation services?: Yes  Family / Caregiver Present: No  Referring Practitioner: Ricky Malik PA-C  Diagnosis: CVA, HTN, Functional decline  Subjective  Subjective: Pt reports  she was having HTN and felt off. Pt called EMS and had elevated BP. Pt had MRI revealing CVA.  Pt transferred to [facility] program.  Type of ROM/Therapeutic Exercise  Type of ROM/Therapeutic Exercise: Resistive Bands  Comment: Orange theraband  Exercises  Scapular Retraction: x10  Shoulder Flexion: x10  Shoulder Extension: x10  Horizontal ABduction: x10  Elbow Flexion: x10  Elbow Extension: x10  Other: Fine motor coordination activities including green putty

## 2020-08-13 NOTE — CARE COORDINATION
[facility] unable to take pt for [facility] due to capacity. [facility]

## 2020-08-13 NOTE — DISCHARGE SUMMARY
Discharge Summary      Patient ID: Anna Marquez      Patient's PCP: Laney Phoenix, MD, MD    Admit Date: 8/6/2020     Discharge Date:  8/13/2020    Admitting Provider: Nicolette Ortiz MD    Discharging Provider: MARCEL Gibson     Reason for this admission:   Declining functional status following recent CVA    Discharge Diagnoses: Active Hospital Problems    Diagnosis Date Noted    CVA (cerebral vascular accident) ([facility] [address] [I63.9] 08/07/2020    Hypertension [I10] 08/07/2020    Type 2 diabetes mellitus without complication, without long-term current use of insulin ([facility] [address] [E11.9] 08/07/2020    Anxiety [F41.9] 08/07/2020    Tobacco abuse [Z72.0] 08/07/2020    Declining functional status [R53.81] 08/06/2020       Procedures:  No orders to display         Consults:   IP CONSULT TO CASE MANAGEMENT  IP CONSULT TO DIETITIAN  IP CONSULT TO HOME CARE NEEDS  PT OT    Briefly:   Ms. Anna Marquez is a 78-year-old female with past medical history of hypertension, anxiety, chronic tobacco abuse who was admitted to [facility] bed for PT OT following a recent CVA. Patient did well with PT OT. Acute issues while inpatient include elevated blood pressure. Antihypertensive regimen was adjusted with better control at time of discharge. Patient encouraged to keep a log of blood pressures on discharge and present that to her PCP at follow-up for further titration. Full details of hospital stay are outlined below. Hospital Course: Active Hospital Problems     Diagnosis Date Noted    CVA (cerebral vascular accident) [facility]) [I63.9]  -Admitted to [facility] 8/3/2020-8/6/2020 for acute infarcts involving both frontal lobes. Patient was outside the window for intervention at time of presentation.  -Echo did not show any valvular abnormalities  -No arrhythmias noted on telemetry  -Fasting lipid panel within normal limits  -A1c 6.3  -Counseled on smoking cessation  -Has residual left-sided weakness.   No speech difficulties or dysphasia.  -Evaluated by Dr. Travon Vasques with neurology who recommended secondary prevention measures with daily aspirin and statin therapy  -PT OT while in swing bed with improvement-Has follow-up with neurology today (8/7)  -Has follow-up with cardiology in 2 weeks for event monitor placement    08/07/2020    Hypertension [I10]  -Blood pressure elevated  during swing bed admission. Blood pressure regimen adjusted. Blood pressure currently controlled with Norvasc, Cozaar, clonidine and Cardura.  -Patient instructed to monitor blood pressure at home and keep a log to present to PCP at follow-up. 08/07/2020    Type 2 diabetes mellitus without complication, without long-term current use of insulin (HCC) [E11.9]  -New diagnosis for patient  -Hemoglobin A1c 6.3. Patient encouraged to monitor blood glucoses. Not currently on any diabetic medication. Diet controlled.    08/07/2020    Anxiety [F41.9]  -PRN hydroxyzine    08/07/2020    Tobacco abuse [Z72.0]  -NRT  -Counseled on cessation    08/07/2020    Declining functional status [R53.81]  -Secondary to recent CVA. Prior to CVA, patient was highly functional and independent with all ADLs. -PT OT with goal of returning home once stronger  -Case management consulted for discharge planning assistance          Disposition: home    Discharged Condition: Stable    Vital Signs  Temp: 97.8 °F (36.6 °C)  Pulse: 65  Resp: 20  BP: (!) 175/102  SpO2: 94 %  O2 Device: None (Room air)       Vital signs reviewed in electronic chart. Physical exam  Constitutional:  Well developed, well nourished, no acute distress. Eyes:  PERRL, conjunctiva normal, sclera without icterus. HENT:  Atraumatic, external ears normal, nose normal, oropharynx moist, no pharyngeal exudates. Neck- supple, no JVD, no lymphadenopathy. Respiratory:  No respiratory distress, no wheezing, rales or rhonchi detected.    Cardiovascular:  Normal rate, normal rhythm, no murmurs, no gallops, no rubs. GI:  Soft, nondistended, normal bowel sounds, nontender, no hepatosplenomegaly appreciated. Musculoskeletal:  No edema, cyanosis or obvious acute deformity. Moving all extremities. Integument:  Warm and dry. No rash. Neurologic:  Alert & oriented x 3, no apparent focal deficits noted. Psychiatric:  Speech and behavior appropriate. Activity: activity as tolerated  Diet: diabetic diet  Follow Up: Primary Care Physician in 2 weeks and with Cardiology as scheduled    Labs:  For convenience and continuity at follow-up the following most recent labs are provided:    CBC:   Lab Results   Component Value Date    WBC 8.4 08/09/2020    HGB 14.4 08/09/2020    HCT 41.7 08/09/2020     08/09/2020       RENAL:   Lab Results   Component Value Date     08/13/2020    K 3.6 08/13/2020    K 3.4 08/07/2020     08/13/2020    CO2 28 08/13/2020    BUN 12 08/13/2020    CREATININE 0.6 08/13/2020         Discharge Medications:     Discharge Medication List as of 8/13/2020 11:37 AM           Details   cloNIDine (CATAPRES) 0.1 MG tablet Take 1 tablet by mouth 3 times daily, Disp-60 tablet,R-3Normal      doxazosin (CARDURA) 4 MG tablet Take 1 tablet by mouth daily, Disp-30 tablet,R-3Normal              Details   losartan (COZAAR) 100 MG tablet Take 1 tablet by mouth daily, Disp-30 tablet,R-3Normal              Details   amLODIPine (NORVASC) 10 MG tablet Take 10 mg by mouth dailyHistorical Med      aspirin 81 MG chewable tablet Take 81 mg by mouth dailyHistorical Med      atorvastatin (LIPITOR) 40 MG tablet Take 40 mg by mouth dailyHistorical Med      nicotine (NICODERM CQ) 21 MG/24HR Place 1 patch onto the skin every 24 hoursHistorical Med      carvedilol (COREG) 25 MG tablet Take 25 mg by mouth 2 times daily (with meals)Historical Med      hydrOXYzine (ATARAX) 25 MG tablet Take 25 mg by mouth every 8 hours as needed for Itching or AnxietyHistorical Med              Patient was seen and examined by Dr. Shha Primes and plan of care reviewed. Signed:  Electronically signed by MARCEL Shea on 8/13/2020 at 11:13 AM       Thank you Ramona Villanueva MD, MD for the opportunity to be involved in this patient's care. If you have any questions or concerns please feel free to contact me at [phone].

## 2020-08-13 NOTE — TELEPHONE ENCOUNTER
Megan with [facility] said Pt is being discharged from [facility] and they have ordered home health for Pt.  Megan is requesting a call back to confirm Dr. Vermeesch is her PCP and if she will follow Pt for home health.

## 2020-08-13 NOTE — TELEPHONE ENCOUNTER
PATIENT DISCHARGING 08/13/2020    DECLINING FUNCTIONAL STATUS.    HOS F/U 08/18 AT 2 PM WITH VERMEESCH.

## 2020-08-13 NOTE — OUTREACH NOTE
Prep Survey      Responses   [facility] facility patient discharged from?  Non-BH   Is LACE score < 7 ?  Non-BH Discharge   Eligibility  [facility]   Date of Discharge  08/13/20   Discharge diagnosis  declining functional status   Does the patient have one of the following disease processes/diagnoses(primary or secondary)?  Other   Prep survey completed?  Yes          Jennifer Villa RN

## 2020-08-13 NOTE — PROGRESS NOTES
Discharge home with home health. Provided education on Cardura. Pt verbalized understanding of discharge instructions. Accompanied to tari.

## 2020-08-14 ENCOUNTER — TRANSITIONAL CARE MANAGEMENT TELEPHONE ENCOUNTER (OUTPATIENT)
Dept: CALL CENTER | Facility: HOSPITAL | Age: 70
End: 2020-08-14

## 2020-08-14 NOTE — OUTREACH NOTE
Call Center TCM Note      Responses   [facility] patient discharged from?  Non-   Does the patient have one of the following disease processes/diagnoses(primary or secondary)?  Other   TCM attempt successful?  Yes   Call start time  1420   Call end time  1425   Discharge diagnosis  declining functional status   Meds reviewed with patient/caregiver?  Yes   Is the patient having any side effects they believe may be caused by any medication additions or changes?  No   Does the patient have all medications ordered at discharge?  Yes   Is the patient taking all medications as directed (includes completed medication regime)?  Yes   Does the patient have a primary care provider?   Yes   Does the patient have an appointment with their PCP within 7 days of discharge?  Yes   Comments regarding PCP  8/18/20 at 2:00 pm    Has the patient kept scheduled appointments due by today?  N/A   What is the Home health agency?   [facility]   Has home health visited the patient within 72 hours of discharge?  Yes   Home health comments  Home health will be visiting on 8/15/20   What DME was ordered?  walker, toilet seat, and shower chair   Has all DME been delivered?  Yes   DME comments  insurance did not pay for toilet seat or shower chair. She will use on of her friends.    Pulse Ox monitoring  None   Psychosocial issues?  No   What is the patient's perception of their health status since discharge?  Improving   Is the patient/caregiver able to teach back signs and symptoms related to disease process for when to call PCP?  Yes   Is the patient/caregiver able to teach back signs and symptoms related to disease process for when to call 911?  Yes   Is the patient/caregiver able to teach back the hierarchy of who to call/visit for symptoms/problems? PCP, Specialist, Home health nurse, Urgent Care, ED, 911  Yes   If the patient is a current smoker, are they able to teach back resources for cessation?  Smoking cessation  medications [Not smoked since 8/7/2020. ]   TCM call completed?  Yes          Erlinda Prather RN    8/14/2020, 14:25

## 2020-08-24 ENCOUNTER — OUTSIDE FACILITY SERVICE (OUTPATIENT)
Dept: INTERNAL MEDICINE | Facility: CLINIC | Age: 70
End: 2020-08-24

## 2020-08-24 PROCEDURE — G0180 MD CERTIFICATION HHA PATIENT: HCPCS | Performed by: INTERNAL MEDICINE

## 2020-08-26 ENCOUNTER — TELEPHONE (OUTPATIENT)
Dept: INTERNAL MEDICINE | Facility: CLINIC | Age: 70
End: 2020-08-26

## 2020-08-26 NOTE — TELEPHONE ENCOUNTER
"PATIENT IS JUST CALLING FOR A STATUS UPDATE ON THE MEDICATIONS.     \"Samantha TRINH from [facility]     Requesting refill on:  1. amLODIPine (NORVASC) 10 MG tablet  2. atorvastatin (LIPITOR) 40 MG tablet     Send to:  [facility] [address] [phone] [facility] [phone] FX      Patient is out of both medications, she was not given refills when she was discharged from the hospital 8/13/20.     Call back number is:  [phone]\"  "

## 2020-08-26 NOTE — TELEPHONE ENCOUNTER
PATIENT STATES THAT THE PHARMACY WAS WORRIED ABOUT A POTENTIAL INTERACTION WITH CLONIDINE .1MG AND DOXAZORINMESYLATE 4MG WITH THE NEW MEDS. PATIENT JUST WANTS TO MAKE SURE THAT THE amLODIPine (NORVASC) 10 MG tablet AND atorvastatin (LIPITOR) 40 MG tablet WILL NOT CAUSE ANY REACTIONS WITH OTHER MEDS. PLEASE ADVISE.     CONTACT: [phone]

## 2020-08-27 ENCOUNTER — TELEPHONE (OUTPATIENT)
Dept: INTERNAL MEDICINE | Facility: CLINIC | Age: 70
End: 2020-08-27

## 2020-08-27 RX ORDER — CLONIDINE HYDROCHLORIDE 0.1 MG/1
0.1 TABLET ORAL 3 TIMES DAILY
COMMUNITY
Start: 2020-08-13 | End: 2020-11-06 | Stop reason: SDUPTHER

## 2020-08-27 RX ORDER — LOSARTAN POTASSIUM 100 MG/1
100 TABLET ORAL DAILY
COMMUNITY
Start: 2020-08-13 | End: 2020-09-09 | Stop reason: SDUPTHER

## 2020-08-27 RX ORDER — AMLODIPINE BESYLATE 10 MG/1
10 TABLET ORAL
Qty: 30 TABLET | Refills: 5 | Status: SHIPPED | OUTPATIENT
Start: 2020-08-27 | End: 2020-10-02 | Stop reason: SDUPTHER

## 2020-08-27 RX ORDER — ATORVASTATIN CALCIUM 40 MG/1
40 TABLET, FILM COATED ORAL NIGHTLY
Qty: 30 TABLET | Refills: 5 | Status: SHIPPED | OUTPATIENT
Start: 2020-08-27 | End: 2020-10-02 | Stop reason: SDUPTHER

## 2020-08-27 RX ORDER — DOXAZOSIN MESYLATE 4 MG/1
4 TABLET ORAL DAILY
COMMUNITY
Start: 2020-08-13 | End: 2020-12-03 | Stop reason: SDUPTHER

## 2020-08-27 NOTE — TELEPHONE ENCOUNTER
PATIENT CALLING IN TO FOLLOW UP FROM YESTERDAY'S PHONE CALLS.   SHE STATES SHE HAS TWO MEDICATIONS ON DISCHARGE THAT STILL HASN'T BEEN FILLED atorvastatin (LIPITOR) 40 MG tablet   amLODIPine (NORVASC) 10 MG tablet    AND SHE IS NOT SURE IF SHE SHOULD STILL BE TAKING:   DOXAZOSIN AND cloNIDine (CATAPRES) 0.1 MG tablet.     PHARMACY:   [facility] [address] [phone] [place] [phone]      PLEASE CALL AND ADVISE:   [phone]

## 2020-08-27 NOTE — TELEPHONE ENCOUNTER
She was started on all these medications at rehab.  Upon review of her blood pressure and heart rate they were doing well on all medications.  Please tell home health to monitor blood pressure and heart rate with no change in current medication.

## 2020-08-28 ENCOUNTER — TELEPHONE (OUTPATIENT)
Dept: INTERNAL MEDICINE | Facility: CLINIC | Age: 70
End: 2020-08-28

## 2020-08-28 NOTE — TELEPHONE ENCOUNTER
[facility] contacted office and is faxing a medication list of recent discharge meds.     Can you please contact the family and let them know correct medications compared to med list from hospital.

## 2020-09-02 ENCOUNTER — TELEPHONE (OUTPATIENT)
Dept: INTERNAL MEDICINE | Facility: CLINIC | Age: 70
End: 2020-09-02

## 2020-09-02 NOTE — TELEPHONE ENCOUNTER
Billie with [facility] called stating Pt's BP is 170/100. Advised Billie Pt is a very anxious person and her BP is always elevated. Billie wanted to know if we could add another BP medication or anxiety medication. Advised Billie we've been seeing Pt for a couple years now, Dr. Vermeesch has tried multiple medications but Pt's BP is always elevated. Billie requested Pt be seen sooner than 9/17, Pt scheduled for this Friday.

## 2020-09-04 ENCOUNTER — OFFICE VISIT (OUTPATIENT)
Dept: INTERNAL MEDICINE | Facility: CLINIC | Age: 70
End: 2020-09-04

## 2020-09-04 VITALS
SYSTOLIC BLOOD PRESSURE: 144 MMHG | DIASTOLIC BLOOD PRESSURE: 94 MMHG | HEIGHT: 62 IN | WEIGHT: 226 LBS | HEART RATE: 65 BPM | OXYGEN SATURATION: 96 % | BODY MASS INDEX: 41.59 KG/M2 | TEMPERATURE: 96.9 F

## 2020-09-04 DIAGNOSIS — I63.9 ACUTE CVA (CEREBROVASCULAR ACCIDENT) (HCC): Primary | ICD-10-CM

## 2020-09-04 DIAGNOSIS — R73.03 BORDERLINE DIABETES MELLITUS: ICD-10-CM

## 2020-09-04 DIAGNOSIS — F41.8 ANXIETY ASSOCIATED WITH DEPRESSION: ICD-10-CM

## 2020-09-04 DIAGNOSIS — I10 ESSENTIAL HYPERTENSION: ICD-10-CM

## 2020-09-04 PROBLEM — R73.9 HYPERGLYCEMIA: Status: RESOLVED | Noted: 2017-12-05 | Resolved: 2020-09-04

## 2020-09-04 PROCEDURE — 99214 OFFICE O/P EST MOD 30 MIN: CPT | Performed by: INTERNAL MEDICINE

## 2020-09-04 NOTE — PROGRESS NOTES
Transitional Care Follow Up Visit  Subjective     Crystal Ragsdale is a 70 y.o. female who presents for a transitional care management visit.    Within 48 business hours after discharge our office contacted her via telephone to coordinate her care and needs.      I reviewed and discussed the details of that call along with the discharge summary, hospital problems, inpatient lab results, inpatient diagnostic studies, and consultation reports with Crystal.     Current outpatient and discharge medications have been reconciled for the patient.  Reviewed by: Marilyn K Vermeesch, MD      Date of TCM Phone Call 8/13/2020   [facility]   Date of Discharge 8/13/2020     Risk for Readmission (LACE) No data recorded    History of Present Illness   Course During Hospital Stay: Patient is here for transitional care management follow-up.  Please see discharge summary from hospital stay August 3-6 below.  Patient was admitted to [facility] for rehab from August 6 through August 13 thereafter.    History of presenting illness/Hospital Course:  This is a 69-year-old female with a history of hypertension, anxiety, chronic tobacco abuse who presented to the emergency room with complaints of onset of left-sided numbness and weakness.  She been in her normal state of health earlier in the day when she took a nap and upon awakening she felt a heaviness in her left lower extremity.  She denied any visual changes, chest pain, slurred speech.  Upon evaluation in the emergency room her systolic blood pressure was 245.  Her blood pressure was noted to be 247/123.  She was given antihypertensive in the emergency room.  CT of the head was negative for acute process.  Given her symptoms and blood pressure she was admitted to the hospitalist service for further medical management.     Upon admission patient was treated for hypertensive urgency.   An MRI of the head was also done which did show findings consistent with acute  infarcts involving both frontal lobes.  I did speak with Dr. Pinto with neurology on-call at [facility] in [place] regarding these findings who stated that there was no intervention at this point as patient is well outside the window for intervention.  She did recommend given that it is in the bilateral frontal lobes to look for an arrhythmia or cardiac cause.       Dr. Sheets with cardiology did see and evaluate patient. There has been no evidence of arrhythmia on tele.  Her 2D echo did not show any valvular abnormalities.  Her EF was noted to be  She was started on Aspirin and Statin therapy.  Her lipid panel was within normal limits.  There was no evidence of of stenosis with a normal carotid artery duplex.  Given that her TTE did not show any valvular abnormalities nor has telemetry shown any cardiac arrhythmias a JING would not have any added benefits as to her treatment plan.  She will need to follow-up with cardiology as an outpatient.  Given that there were no arrhythmias on telemetry she may require a Holter monitor for further evaluation.  Dr. Sheets with cardiology will follow her up in the office.  We will arrange for her to follow-up with neurology as an outpatient.  She has been counseled on smoking cessation.  I have also advised that she cannot operate a motor vehicle or any equipment until she is cleared by neurology.   Upon admission she was noted to have weakness of her left upper and lower extremity which is already showing some improvement.  PT OT was consulted for strength and mobility.  Speech therapy did see and evaluate at bedside for which she had no evidence of dysphagia or aspiration.  Again she cannot operate motor vehicle or equipment until cleared by neurology.   She was noted to have Hgb A1C 6.3. Diabetes educator consulted.  It is recommended that she monitor her BS at home.      She does live alone and at this point it be difficult to go home without further therapy.  Case management  was consulted and she has been accepted to [facility].  She was in rehab for about a week.  Her HTN required medication adjustment.  She is doing PT and OT at home.  She needs help with steps and opening doors.  Her left-sided weakness is much improved.  She does have some numbness over the left side of her face near nose and upper lip.  She denies any CP or SOA.  No HAs. She does have some edema.  She is eating OK.  She has family and neighbors staying with her to help.  She has family cooking for her.  She is able to bath and wash her hair.    Her BP is running 134-168/72-96.    She is using a cane at home, but today is in wheelchair.   She will see Dr Frye next month and have a cardiac monitor placed then.    She takes hydroxyzine TID for anxiety.  She does not understand why she is anxious.       The following portions of the patient's history were reviewed and updated as appropriate: allergies, current medications, past family history, past medical history, past social history, past surgical history and problem list.    Review of Systems   Constitutional: Negative for activity change, appetite change and unexpected weight change.   Eyes: Negative for visual disturbance.   Respiratory: Negative for shortness of breath.    Cardiovascular: Positive for leg swelling. Negative for chest pain and palpitations.   Gastrointestinal: Negative for abdominal pain.   Musculoskeletal: Positive for gait problem.   Neurological: Positive for numbness. Negative for headaches.   Psychiatric/Behavioral: Negative for dysphoric mood and sleep disturbance. The patient is nervous/anxious.        Objective   Physical Exam   Constitutional: She is oriented to person, place, and time. She appears well-developed and well-nourished. No distress.   Very kind and pleasant female, wearing a mask and seated in wheelchair today   HENT:   Head: Normocephalic and atraumatic.   Right Ear: External ear normal.   Left Ear: External  ear normal.   Eyes: Conjunctivae and EOM are normal. Right eye exhibits no discharge. Left eye exhibits no discharge.   Right pupil approximately 5 mm and reactive, left pupil approximately 3 mm and reactive   Neck: Normal range of motion. Neck supple. No thyromegaly present.   Cardiovascular: Normal rate, regular rhythm, normal heart sounds and intact distal pulses.   No murmur heard.  No carotid bruits   Pulmonary/Chest: Effort normal and breath sounds normal. No respiratory distress. She has no wheezes.   Abdominal: Soft. Bowel sounds are normal. She exhibits no distension. There is no tenderness.   Musculoskeletal: Normal range of motion. She exhibits edema.   Edema at ankles   Lymphadenopathy:     She has no cervical adenopathy.   Neurological: She is alert and oriented to person, place, and time. No cranial nerve deficit. She exhibits normal muscle tone.   Muscle strength is 5/5 in all extremities   Psychiatric: Her behavior is normal. Judgment and thought content normal.   Patient appears anxious, she has rapid speech and often interrupts while others are speaking-this is not new for her   Nursing note and vitals reviewed.      Assessment/Plan   Crystal was seen today for transitional care management.    Diagnoses and all orders for this visit:    Acute CVA (cerebrovascular accident) (CMS/[facility])    Essential hypertension    Borderline diabetes mellitus    Anxiety associated with depression  -     Ambulatory Referral to Psychiatry    Follow heart healthy/low salt diet  Avoid processed foods  Monitor blood pressure as discussed-overall her home blood pressures are much improved compared to in the past, she was encouraged to continue monitoring this along with home health nurses  Exercise as tolerated up to 30 minutes 5 days per week  Take all medications as prescribed  Continue all current blood pressure medications  CVA symptoms are all much improved, she continues to do home health physical and occupational  therapy  Encouraged her to avoid excessive amounts of sweets  Refer to nurse practitioner psychiatrist for help with management of her chronic anxiety symptoms, of note she did have gene site testing in 2016 to help with her medication management    Return to clinic in 4 weeks at which time we will do lab work

## 2020-09-09 RX ORDER — LOSARTAN POTASSIUM 50 MG/1
50 TABLET ORAL DAILY
Qty: 90 TABLET | Refills: 1 | Status: SHIPPED | OUTPATIENT
Start: 2020-09-09 | End: 2020-10-02 | Stop reason: SDUPTHER

## 2020-09-24 ENCOUNTER — TELEPHONE (OUTPATIENT)
Dept: INTERNAL MEDICINE | Facility: CLINIC | Age: 70
End: 2020-09-24

## 2020-09-24 NOTE — TELEPHONE ENCOUNTER
Please ask patient if she will take this medication on Monday, Wednesday and Friday.  Please tell her that often any side effects will resolved with decreasing the number of days a week she is taking it.  I also recommend she take co-Q10 100 mg every day of the week.  Please tell her this medication helps decrease her risk of stroke in the future.

## 2020-09-24 NOTE — TELEPHONE ENCOUNTER
MARK WITH [facility] CALLED WHILE WITH THE PT STATING THAT THE PT HAS SOME MUSCLE WEAKNESS AND A LITTLE BIT OF PAIN.  PT BELIEVES ITS FROM THE LIPITOR.  PT NO LONGER WANTS TO TAKE ANYMORE.  PT WOULD LIKE TO HAVE SOMETHING TO REPLACE THIS MED.    MARK'S CONTACT [phone]  [facility]    PLEASE ADVISE

## 2020-09-25 ENCOUNTER — DOCUMENTATION (OUTPATIENT)
Dept: SOCIAL WORK | Facility: HOSPITAL | Age: 70
End: 2020-09-25

## 2020-09-25 NOTE — TELEPHONE ENCOUNTER
Spoke with Minerva, states she called yesterday and spoke with someone and they delivered Dr. Vermeesch's message.

## 2020-09-25 NOTE — PROGRESS NOTES
SW received a call from pt this morning. Pt is requesting some information on sitter services and agencies who could come in and assist at home. SW discussed private pay with sitter services, she states she may be able to afford a few hours a day for someone to help with cleaning and cooking. Pt states she has home health and they have worked with her to where she is now using a cane to walk around. Pt reports able to bathe, dress, ambulate independently. However, she would like someone there to help with cooking, cleaning, and to be around while she is ambulating in case a fall occurred.     Encouraged pt to reach out to neighbors, friends, and family to see if anyone could assist her a few days a week. She states she does have some friends who may be able to stop in sometimes. SW also discussed [employer] [facility] and services they may be able to offer. Included contact information for [employer] and [employer]. SW also provided pt with a [facility] booklet with many resources that would be beneficial. Encouraged pt to discuss concerns with PCP and Home Health  as well, they may have other resources within the community. Pt able to contact agencies on her own regarding pricing and services.    SW mailed resources with pt's permission to address provided of [address] [place], [place] [address]REI Adam. Encouraged pt to call back with any questions. She was appreciative of information provided and resources being mailed. No further needs at this time. Pt states she has food in home, and is able to care for self. Plans to continue working with home health care, PT/OT/RN/MYRIAM. No further needs at this time.       Cheryle Juarez, LESTER  09/25/20  10:45 EDT

## 2020-10-02 ENCOUNTER — OFFICE VISIT (OUTPATIENT)
Dept: INTERNAL MEDICINE | Facility: CLINIC | Age: 70
End: 2020-10-02

## 2020-10-02 VITALS
DIASTOLIC BLOOD PRESSURE: 88 MMHG | SYSTOLIC BLOOD PRESSURE: 140 MMHG | OXYGEN SATURATION: 97 % | BODY MASS INDEX: 41.22 KG/M2 | HEART RATE: 66 BPM | HEIGHT: 62 IN | WEIGHT: 224 LBS | TEMPERATURE: 97 F

## 2020-10-02 DIAGNOSIS — I10 ESSENTIAL HYPERTENSION: Primary | ICD-10-CM

## 2020-10-02 DIAGNOSIS — F41.8 ANXIETY ASSOCIATED WITH DEPRESSION: ICD-10-CM

## 2020-10-02 DIAGNOSIS — R73.03 BORDERLINE DIABETES MELLITUS: ICD-10-CM

## 2020-10-02 DIAGNOSIS — I63.9 ACUTE CVA (CEREBROVASCULAR ACCIDENT) (HCC): ICD-10-CM

## 2020-10-02 PROCEDURE — 99214 OFFICE O/P EST MOD 30 MIN: CPT | Performed by: INTERNAL MEDICINE

## 2020-10-02 RX ORDER — CARVEDILOL 25 MG/1
25 TABLET ORAL 2 TIMES DAILY WITH MEALS
Qty: 180 TABLET | Refills: 1 | Status: SHIPPED | OUTPATIENT
Start: 2020-10-02 | End: 2020-10-08 | Stop reason: SDUPTHER

## 2020-10-02 RX ORDER — ATORVASTATIN CALCIUM 40 MG/1
40 TABLET, FILM COATED ORAL NIGHTLY
Qty: 90 TABLET | Refills: 1 | Status: SHIPPED | OUTPATIENT
Start: 2020-10-02 | End: 2020-10-02

## 2020-10-02 RX ORDER — LOSARTAN POTASSIUM 50 MG/1
50 TABLET ORAL DAILY
Qty: 90 TABLET | Refills: 1 | Status: SHIPPED | OUTPATIENT
Start: 2020-10-02 | End: 2020-10-08 | Stop reason: SDUPTHER

## 2020-10-02 RX ORDER — AMLODIPINE BESYLATE 10 MG/1
10 TABLET ORAL
Qty: 90 TABLET | Refills: 1 | Status: SHIPPED | OUTPATIENT
Start: 2020-10-02 | End: 2020-10-08 | Stop reason: SDUPTHER

## 2020-10-02 RX ORDER — HYDROXYZINE HYDROCHLORIDE 25 MG/1
25 TABLET, FILM COATED ORAL EVERY 8 HOURS PRN
Qty: 180 TABLET | Refills: 1 | Status: SHIPPED | OUTPATIENT
Start: 2020-10-02 | End: 2020-10-08 | Stop reason: SDUPTHER

## 2020-10-02 RX ORDER — ROSUVASTATIN CALCIUM 10 MG/1
10 TABLET, COATED ORAL NIGHTLY
Qty: 90 TABLET | Refills: 3 | Status: SHIPPED | OUTPATIENT
Start: 2020-10-02 | End: 2020-10-08 | Stop reason: SDUPTHER

## 2020-10-02 NOTE — PROGRESS NOTES
Chief Complaint   Patient presents with   • Follow-up     4 weeks for anxiety, depression, and HTN. Pt would like to discuss liptor      Subjective   Crystal Ragsdale is a 70 y.o. female.     Here today for follow-up of hypertension, recent CVA, borderline diabetes, anxiety and depression, history of breast cancer.  HTN/HLD/CVA-BP is borderline high today as usual due to underlying anxiety.  No recent CP, SOA, palpitations, headache or edema.  She remains in physical therapy for recent CVA.  She is done with PT on her arm but not with her leg.  She still has 2 more weeks of therapy on her leg.  She is very anxious to resume driving.  Her BP at home is running 120-144/70-80.  Patient states she does not feel on Lipitor and wants to discontinue this medication.  She is willing to try a different cholesterol medication  Prediabetes-A1c 6.3 approximately 2 months ago.  She does not follow a specific diet for her prediabetes or cholesterol issue.  Anxiety/depression-  She is not having much anxiety or depression. She is sleeping well at night.    History of breast cancer- she will schedule her mammogram in [place].   HCM- she declines a flu vaccine.        The following portions of the patient's history were reviewed and updated as appropriate: allergies, current medications, past family history, past medical history, past social history, past surgical history and problem list.    Review of Systems   Constitutional: Negative for activity change, appetite change and unexpected weight change.   Eyes: Negative for visual disturbance.   Respiratory: Negative for shortness of breath.    Cardiovascular: Negative for chest pain, palpitations and leg swelling.   Gastrointestinal: Negative for abdominal pain.   Musculoskeletal: Positive for gait problem.        Ambulates with use of a cane   Neurological: Positive for weakness. Negative for headaches.   Psychiatric/Behavioral: Negative for dysphoric mood and sleep disturbance. The  "patient is nervous/anxious.        Objective   /88   Pulse 66   Temp 97 °F (36.1 °C)   Ht 157.5 cm (62\")   Wt 102 kg (224 lb)   SpO2 97%   BMI 40.97 kg/m²   Body mass index is 40.97 kg/m².  Physical Exam  Vitals signs and nursing note reviewed.   Constitutional:       General: She is not in acute distress.     Appearance: Normal appearance. She is well-developed. She is obese. She is not ill-appearing.      Comments: Pleasant female, ambulating with use of a cane, wearing a mask and in no distress today   HENT:      Head: Normocephalic and atraumatic.      Right Ear: External ear normal.      Left Ear: External ear normal.   Eyes:      General:         Right eye: No discharge.         Left eye: No discharge.      Extraocular Movements: Extraocular movements intact.      Conjunctiva/sclera: Conjunctivae normal.      Pupils: Pupils are equal, round, and reactive to light.   Neck:      Musculoskeletal: Normal range of motion and neck supple.      Thyroid: No thyromegaly.   Cardiovascular:      Rate and Rhythm: Normal rate and regular rhythm.      Pulses: Normal pulses.      Heart sounds: Normal heart sounds. No murmur.      Comments: No carotid bruits  Pulmonary:      Effort: Pulmonary effort is normal. No respiratory distress.      Breath sounds: Normal breath sounds. No wheezing.   Abdominal:      General: Bowel sounds are normal. There is no distension.      Palpations: Abdomen is soft.      Tenderness: There is no abdominal tenderness.   Musculoskeletal: Normal range of motion.      Right lower leg: No edema.      Left lower leg: Edema present.      Comments: +1 edema at left ankle   Lymphadenopathy:      Cervical: No cervical adenopathy.   Skin:     General: Skin is warm.      Findings: No rash.   Neurological:      General: No focal deficit present.      Mental Status: She is alert and oriented to person, place, and time.      Cranial Nerves: No cranial nerve deficit.      Coordination: Coordination " normal.   Psychiatric:         Mood and Affect: Mood normal.         Behavior: Behavior normal.         Thought Content: Thought content normal.         Judgment: Judgment normal.         Assessment/Plan   Crystal Ragsdale is here today and the following problems have been addressed:      Crystal was seen today for follow-up.    Diagnoses and all orders for this visit:    Essential hypertension    Borderline diabetes mellitus    Anxiety associated with depression    Acute CVA (cerebrovascular accident) (CMS/HCC)    Other orders  -     amLODIPine (NORVASC) 10 MG tablet; Take 1 tablet by mouth Daily.  -     Discontinue: atorvastatin (LIPITOR) 40 MG tablet; Take 1 tablet by mouth Every Night.  -     carvedilol (COREG) 25 MG tablet; Take 1 tablet by mouth 2 (Two) Times a Day With Meals.  -     hydrOXYzine (ATARAX) 25 MG tablet; Take 1 tablet by mouth Every 8 (Eight) Hours As Needed for Anxiety.  -     losartan (COZAAR) 50 MG tablet; Take 1 tablet by mouth Daily.  -     rosuvastatin (Crestor) 10 MG tablet; Take 1 tablet by mouth Every Night.    Follow heart healthy/low salt/low cholesterol diet, avoid excessive amounts of sweets-decrease portion size to help with weight management  Avoid processed foods  Monitor blood pressure as discussed  Exercise as tolerated up to 150 minutes per week  Take all medications as prescribed  Discontinue atorvastatin and change to Crestor per patient request due to myalgias with atorvastatin  Encouraged her to take co-Q10 100 mg daily  Continue hydroxyzine to help with underlying anxiety  Told patient she could drive her golf cart around her subdivision and yard and if she is doing well and comfortable with this when she has completed physical therapy I will consider allowing her to resume driving  Patient declines all vaccines      Return in about 3 months (around 1/2/2021) for Medicare Wellness.      Marilyn K. Vermeesch, MD      Please note that portions of this note were completed with a  voice recognition program.  Efforts were made to edit dictation, but occasionally words are mistranscribed.

## 2020-10-06 ENCOUNTER — TELEPHONE (OUTPATIENT)
Dept: INTERNAL MEDICINE | Facility: CLINIC | Age: 70
End: 2020-10-06

## 2020-10-06 DIAGNOSIS — I63.9 ACUTE CVA (CEREBROVASCULAR ACCIDENT) (HCC): Primary | ICD-10-CM

## 2020-10-06 NOTE — TELEPHONE ENCOUNTER
Please tell patient I made referral to outpatient physical therapy and have asked them to determine if they feel she is stable for driving, if so they were told to let me know and I will clear her.

## 2020-10-06 NOTE — TELEPHONE ENCOUNTER
Patient called and stated that she needs the followin. A referral to out patient therapy.  Her home health is complete.  2. Patient would like to be released to drive.    Patient callback: [phone]    Please advise

## 2020-10-08 ENCOUNTER — TELEPHONE (OUTPATIENT)
Dept: INTERNAL MEDICINE | Facility: CLINIC | Age: 70
End: 2020-10-08

## 2020-10-08 RX ORDER — LOSARTAN POTASSIUM 50 MG/1
50 TABLET ORAL DAILY
Qty: 90 TABLET | Refills: 1 | Status: SHIPPED | OUTPATIENT
Start: 2020-10-08 | End: 2021-03-18 | Stop reason: SDUPTHER

## 2020-10-08 RX ORDER — ROSUVASTATIN CALCIUM 10 MG/1
10 TABLET, COATED ORAL NIGHTLY
Qty: 90 TABLET | Refills: 3 | Status: SHIPPED | OUTPATIENT
Start: 2020-10-08 | End: 2021-07-08 | Stop reason: SDUPTHER

## 2020-10-08 RX ORDER — HYDROXYZINE HYDROCHLORIDE 25 MG/1
25 TABLET, FILM COATED ORAL EVERY 8 HOURS PRN
Qty: 180 TABLET | Refills: 1 | Status: SHIPPED | OUTPATIENT
Start: 2020-10-08 | End: 2021-03-18 | Stop reason: SDUPTHER

## 2020-10-08 RX ORDER — CARVEDILOL 25 MG/1
25 TABLET ORAL 2 TIMES DAILY WITH MEALS
Qty: 180 TABLET | Refills: 1 | Status: SHIPPED | OUTPATIENT
Start: 2020-10-08 | End: 2021-03-18 | Stop reason: SDUPTHER

## 2020-10-08 RX ORDER — AMLODIPINE BESYLATE 10 MG/1
10 TABLET ORAL
Qty: 90 TABLET | Refills: 1 | Status: SHIPPED | OUTPATIENT
Start: 2020-10-08 | End: 2021-03-18 | Stop reason: SDUPTHER

## 2020-10-08 NOTE — TELEPHONE ENCOUNTER
Patient stated she had medication called in to [facility] on 10/2/20. Patient was informed by [facility] that her insurance does not cover her using them. Patient would like to know if her prescriptions could be sent to [facility] instead.    Patient needs:  amLODIPine (NORVASC) 10 MG tablet,  carvedilol (COREG) 25 MG tablet,  hydrOXYzine (ATARAX) 25 MG tablet,  losartan (COZAAR) 50 MG tablet, and   rosuvastatin (Crestor) 10 MG tablet     Please call and advise. Patient call back [phone]      [facility] [place], [place] - [address] [place] [address] [phone]  - [phone]

## 2020-10-22 ENCOUNTER — TELEPHONE (OUTPATIENT)
Dept: INTERNAL MEDICINE | Facility: CLINIC | Age: 70
End: 2020-10-22

## 2020-10-22 DIAGNOSIS — Z86.73 STATUS POST CVA: Primary | ICD-10-CM

## 2020-10-22 NOTE — TELEPHONE ENCOUNTER
PATIENT CALLED STATING HER [facility] PHYSICAL THERAPIST NEEDS A REFERRAL TO OK HER DRIVING. THE PHYSICAL THERAPIST ESHA STATES THE PATIENT IS OK TO DRIVE BUT SHE NEEDS A REFERRAL FROM DR VERMEESCH.    PATIENT CALL BACK  [phone]    [facility] PHYSICAL THERAP[facility] PHONE:[phone]  ASK FOR ESHA    PATIENT WOULD LIKE TO KNOW WHEN THE REFERRAL IS SENT.

## 2020-11-06 RX ORDER — CLONIDINE HYDROCHLORIDE 0.1 MG/1
0.1 TABLET ORAL 3 TIMES DAILY
Qty: 270 TABLET | Refills: 3 | Status: SHIPPED | OUTPATIENT
Start: 2020-11-06 | End: 2021-07-08 | Stop reason: SDUPTHER

## 2020-11-06 NOTE — TELEPHONE ENCOUNTER
PT CALLED TO REQUEST REFILL FOR RX  cloNIDine (CATAPRES) 0.1 MG tablet,  PT STATED THAT SHE IS COMPLETELY OF RX.    PLEASE ADVISE.  CALL BACK:[phone]      [facility] - [place], [place] - [address]

## 2020-12-03 RX ORDER — DOXAZOSIN MESYLATE 4 MG/1
4 TABLET ORAL DAILY
Qty: 90 TABLET | Refills: 1 | Status: SHIPPED | OUTPATIENT
Start: 2020-12-03 | End: 2021-03-18 | Stop reason: SDUPTHER

## 2020-12-03 NOTE — TELEPHONE ENCOUNTER
Pharmacy requesting refill on Doxazosin 4 mg once daily. Medication hasn't been prescribed by you before. This was prescribed to Pt during hospital visit in August.      Pt UTD on labs and appts. Script pended for approval.

## 2021-02-01 ENCOUNTER — TELEPHONE (OUTPATIENT)
Dept: INTERNAL MEDICINE | Facility: CLINIC | Age: 71
End: 2021-02-01

## 2021-02-01 NOTE — TELEPHONE ENCOUNTER
Caller: Crystal Ragsdale    Relationship to patient: Self    Best call back number: [phone]    Concerns or Questions if Applicable: PATIENT SAID A NEIGHBOR BROUGHT HER OVER FOOD TUES,  [id number] AND THIS  NEIGHBOR TESTED POSITIVE FOR THE VIRUS ON Friday, [id number] AND SHE DIDN'T KNOW WHAT SHE SHOULD BE DOING; SHE IS NOT HAVING ANY SYMPTOMS

## 2021-02-01 NOTE — TELEPHONE ENCOUNTER
500 mg vitamin C, 1000 units of vitamin D.  No need to have any testing at this time as she does not have any symptoms.  She should always be wearing a mask whenever anyone comes to her home.  Hopefully they were both wearing masks.

## 2021-02-04 ENCOUNTER — TELEPHONE (OUTPATIENT)
Dept: INTERNAL MEDICINE | Facility: CLINIC | Age: 71
End: 2021-02-04

## 2021-02-04 NOTE — TELEPHONE ENCOUNTER
Patient did not complete cologuard ordered on 01/30/2020. This order is too old to be used and has been cancelled.

## 2021-03-18 RX ORDER — LOSARTAN POTASSIUM 50 MG/1
50 TABLET ORAL DAILY
Qty: 90 TABLET | Refills: 1 | Status: SHIPPED | OUTPATIENT
Start: 2021-03-18 | End: 2021-07-08 | Stop reason: SDUPTHER

## 2021-03-18 RX ORDER — AMLODIPINE BESYLATE 10 MG/1
10 TABLET ORAL
Qty: 90 TABLET | Refills: 1 | Status: SHIPPED | OUTPATIENT
Start: 2021-03-18 | End: 2021-07-08 | Stop reason: SDUPTHER

## 2021-03-18 RX ORDER — CARVEDILOL 25 MG/1
25 TABLET ORAL 2 TIMES DAILY WITH MEALS
Qty: 180 TABLET | Refills: 1 | Status: SHIPPED | OUTPATIENT
Start: 2021-03-18 | End: 2021-07-08 | Stop reason: SDUPTHER

## 2021-03-18 RX ORDER — HYDROXYZINE HYDROCHLORIDE 25 MG/1
25 TABLET, FILM COATED ORAL EVERY 8 HOURS PRN
Qty: 180 TABLET | Refills: 1 | Status: SHIPPED | OUTPATIENT
Start: 2021-03-18 | End: 2021-07-08 | Stop reason: SDUPTHER

## 2021-03-18 RX ORDER — DOXAZOSIN MESYLATE 4 MG/1
4 TABLET ORAL DAILY
Qty: 90 TABLET | Refills: 1 | Status: SHIPPED | OUTPATIENT
Start: 2021-03-18 | End: 2021-07-08 | Stop reason: SDUPTHER

## 2021-03-26 ENCOUNTER — OFFICE VISIT (OUTPATIENT)
Dept: INTERNAL MEDICINE | Facility: CLINIC | Age: 71
End: 2021-03-26

## 2021-03-26 VITALS
HEIGHT: 62 IN | BODY MASS INDEX: 43.61 KG/M2 | SYSTOLIC BLOOD PRESSURE: 138 MMHG | HEART RATE: 73 BPM | DIASTOLIC BLOOD PRESSURE: 88 MMHG | TEMPERATURE: 97 F | WEIGHT: 237 LBS | OXYGEN SATURATION: 94 %

## 2021-03-26 DIAGNOSIS — E66.01 CLASS 3 SEVERE OBESITY DUE TO EXCESS CALORIES WITH SERIOUS COMORBIDITY AND BODY MASS INDEX (BMI) OF 40.0 TO 44.9 IN ADULT (HCC): ICD-10-CM

## 2021-03-26 DIAGNOSIS — R73.03 BORDERLINE DIABETES MELLITUS: ICD-10-CM

## 2021-03-26 DIAGNOSIS — F41.8 ANXIETY ASSOCIATED WITH DEPRESSION: ICD-10-CM

## 2021-03-26 DIAGNOSIS — Z00.00 ENCOUNTER FOR MEDICARE ANNUAL WELLNESS EXAM: Primary | ICD-10-CM

## 2021-03-26 DIAGNOSIS — Z11.59 ENCOUNTER FOR HEPATITIS C SCREENING TEST FOR LOW RISK PATIENT: ICD-10-CM

## 2021-03-26 DIAGNOSIS — I10 ESSENTIAL HYPERTENSION: ICD-10-CM

## 2021-03-26 DIAGNOSIS — Z86.73 STATUS POST CVA: ICD-10-CM

## 2021-03-26 PROBLEM — I63.9 ACUTE CVA (CEREBROVASCULAR ACCIDENT) (HCC): Status: RESOLVED | Noted: 2020-08-06 | Resolved: 2021-03-26

## 2021-03-26 PROCEDURE — 1160F RVW MEDS BY RX/DR IN RCRD: CPT | Performed by: INTERNAL MEDICINE

## 2021-03-26 PROCEDURE — G0439 PPPS, SUBSEQ VISIT: HCPCS | Performed by: INTERNAL MEDICINE

## 2021-03-26 PROCEDURE — 1125F AMNT PAIN NOTED PAIN PRSNT: CPT | Performed by: INTERNAL MEDICINE

## 2021-03-26 PROCEDURE — 1170F FXNL STATUS ASSESSED: CPT | Performed by: INTERNAL MEDICINE

## 2021-03-26 PROCEDURE — 96160 PT-FOCUSED HLTH RISK ASSMT: CPT | Performed by: INTERNAL MEDICINE

## 2021-03-26 PROCEDURE — 99397 PER PM REEVAL EST PAT 65+ YR: CPT | Performed by: INTERNAL MEDICINE

## 2021-03-26 NOTE — PROGRESS NOTES
The ABCs of the Annual Wellness Visit  Subsequent Medicare Wellness Visit    Chief Complaint   Patient presents with   • Medicare Wellness-subsequent     Pt states she's having bilateral leg weakness and pain.        Subjective   History of Present Illness:  Crystal Ragsdale is a 70 y.o. female who presents for a Subsequent Medicare Wellness Visit.  Here today for follow up of HTN, preDM, anx/depression, breast cancer and history of CVA.  Her BP is well controlled today.  She does not sleep well at night.  She says she dreams all night and also urinates a lot at night.  Her legs were also very painful. She stopped the crestor this week and says she slept better, less urination and legs felt better.  Her left leg and arm feel heavy.  Her CVA was 7 mo ago and she still has left sided weakness and difficulty with balance.  She is lonely, does her crafts and this is helpful.  She continues to avoid cigarettes at this time.    HEALTH RISK ASSESSMENT    Recent Hospitalizations:  Recently treated at the following:  [facility]    Current Medical Providers:  Patient Care Team:  Vermeesch, Marilyn K, MD as PCP - General (Internal Medicine & Pediatrics)    Smoking Status:  Social History     Tobacco Use   Smoking Status Current Every Day Smoker   • Packs/day: 1.00   Smokeless Tobacco Never Used       Alcohol Consumption:  Social History     Substance and Sexual Activity   Alcohol Use No       Depression Screen:   PHQ-2/PHQ-9 Depression Screening 3/26/2021   Little interest or pleasure in doing things 0   Feeling down, depressed, or hopeless 1   Total Score 1       Fall Risk Screen:  STEADI Fall Risk Assessment was completed, and patient is at MODERATE risk for falls. Assessment completed on:3/26/2021    Health Habits and Functional and Cognitive Screening:  Functional & Cognitive Status 3/26/2021   Do you have difficulty preparing food and eating? No   Do you have difficulty bathing yourself, getting dressed or  grooming yourself? Yes   Do you have difficulty using the toilet? -   Do you have difficulty moving around from place to place? Yes   Do you have trouble with steps or getting out of a bed or a chair? Yes   Current Diet Limited Junk Food   Dental Exam Not up to date   Eye Exam Not up to date   Exercise (times per week) 0 times per week   Current Exercise Activities Include None   Do you need help using the phone?  No   Are you deaf or do you have serious difficulty hearing?  No   Do you need help with transportation? No   Do you need help shopping? No   Do you need help preparing meals?  No   Do you need help with housework?  No   Do you need help with laundry? No   Do you need help taking your medications? No   Do you need help managing money? No   Do you ever drive or ride in a car without wearing a seat belt? No   Have you felt unusual stress, anger or loneliness in the last month? Yes   Who do you live with? Alone   If you need help, do you have trouble finding someone available to you? No   Do you have difficulty concentrating, remembering or making decisions? Yes         Does the patient have evidence of cognitive impairment? No    Asprin use counseling:Taking ASA appropriately as indicated    Age-appropriate Screening Schedule:  Refer to the list below for future screening recommendations based on patient's age, sex and/or medical conditions. Orders for these recommended tests are listed in the plan section. The patient has been provided with a written plan.    Health Maintenance   Topic Date Due   • URINE MICROALBUMIN  Never done   • DXA SCAN  Never done   • COLONOSCOPY  Never done   • ZOSTER VACCINE (1 of 2) Never done   • DIABETIC FOOT EXAM  Never done   • PAP SMEAR  Never done   • DIABETIC EYE EXAM  Never done   • INFLUENZA VACCINE  08/01/2020   • HEMOGLOBIN A1C  02/03/2021   • MAMMOGRAM  04/19/2021   • LIPID PANEL  08/03/2021   • TDAP/TD VACCINES  Discontinued          The following portions of the  patient's history were reviewed and updated as appropriate: allergies, current medications, past family history, past medical history, past social history, past surgical history and problem list.    Outpatient Medications Prior to Visit   Medication Sig Dispense Refill   • amLODIPine (NORVASC) 10 MG tablet Take 1 tablet by mouth Daily. 90 tablet 1   • aspirin 81 MG chewable tablet Chew 1 tablet Daily.     • carvedilol (COREG) 25 MG tablet Take 1 tablet by mouth 2 (Two) Times a Day With Meals. 180 tablet 1   • cloNIDine (CATAPRES) 0.1 MG tablet Take 1 tablet by mouth 3 (Three) Times a Day. 270 tablet 3   • doxazosin (CARDURA) 4 MG tablet Take 1 tablet by mouth Daily. 90 tablet 1   • hydrOXYzine (ATARAX) 25 MG tablet Take 1 tablet by mouth Every 8 (Eight) Hours As Needed for Anxiety. 180 tablet 1   • losartan (COZAAR) 50 MG tablet Take 1 tablet by mouth Daily. 90 tablet 1   • rosuvastatin (Crestor) 10 MG tablet Take 1 tablet by mouth Every Night. 90 tablet 3     No facility-administered medications prior to visit.       Patient Active Problem List   Diagnosis   • Essential hypertension   • Anxiety associated with depression   • Encounter for Medicare annual wellness exam   • Tobacco abuse counseling   • Chronic pain of left ankle   • Colon cancer screening   • Hypertensive urgency   • History of breast cancer   • Borderline diabetes mellitus   • Encounter for hepatitis C screening test for low risk patient   • Status post CVA       Advanced Care Planning:  ACP discussion was held with the patient during this visit. Patient has an advance directive in EMR which is still valid.     Review of Systems   HENT: Negative.         Dry mouth   Eyes: Negative.    Respiratory: Negative.    Cardiovascular: Negative.    Gastrointestinal: Negative.    Endocrine: Negative.    Genitourinary: Negative.    Musculoskeletal: Negative.         Heaviness in left arm and leg   Skin: Negative.    Allergic/Immunologic: Positive for  "environmental allergies.   Neurological: Positive for weakness. Negative for headaches.   Hematological: Negative.    Psychiatric/Behavioral: Positive for sleep disturbance.       Compared to one year ago, the patient feels her physical health is the same.  Compared to one year ago, the patient feels her mental health is the same.    Reviewed chart for potential of high risk medication in the elderly: yes  Reviewed chart for potential of harmful drug interactions in the elderly:yes    Objective         Vitals:    03/26/21 1015   BP: 138/88   Pulse: 73   Temp: 97 °F (36.1 °C)   SpO2: 94%   Weight: 108 kg (237 lb)   Height: 157.5 cm (62\")   PainSc:   3       Body mass index is 43.35 kg/m².  Discussed the patient's BMI with her. The BMI is above average; BMI management plan is completed.    Physical Exam  Vitals and nursing note reviewed.   Constitutional:       General: She is not in acute distress.     Appearance: Normal appearance. She is well-developed. She is obese. She is not ill-appearing.      Comments: Kind and pleasant female, appears her age and in no distress today   HENT:      Head: Normocephalic and atraumatic.      Right Ear: Tympanic membrane, ear canal and external ear normal.      Left Ear: Tympanic membrane, ear canal and external ear normal.   Eyes:      General:         Right eye: No discharge.         Left eye: No discharge.      Extraocular Movements: Extraocular movements intact.      Conjunctiva/sclera: Conjunctivae normal.      Pupils: Pupils are equal, round, and reactive to light.   Neck:      Thyroid: No thyromegaly.   Cardiovascular:      Rate and Rhythm: Normal rate and regular rhythm.      Pulses: Normal pulses.      Heart sounds: Normal heart sounds. No murmur heard.        Comments: No carotid bruits  Pulmonary:      Effort: Pulmonary effort is normal. No respiratory distress.      Breath sounds: Normal breath sounds. No wheezing.   Abdominal:      General: Bowel sounds are normal. " There is no distension.      Palpations: Abdomen is soft.      Tenderness: There is no abdominal tenderness.   Musculoskeletal:         General: Normal range of motion.      Cervical back: Normal range of motion and neck supple.      Right lower leg: Edema present.      Left lower leg: Edema present.      Comments: +1 edema to ankles bilaterally   Lymphadenopathy:      Cervical: No cervical adenopathy.   Skin:     General: Skin is warm.      Findings: No rash.      Comments: Feet with no callus or ulcer, sensation grossly intact bilaterally   Neurological:      General: No focal deficit present.      Mental Status: She is alert and oriented to person, place, and time. Mental status is at baseline.      Cranial Nerves: No cranial nerve deficit.      Motor: Weakness present.      Coordination: Coordination normal.      Gait: Gait abnormal.      Comments: Ambulates with use of a cane to help maintain balance, slight decrease in muscular strength of left arm and left leg compared to right side   Psychiatric:         Mood and Affect: Mood normal.         Behavior: Behavior normal.         Thought Content: Thought content normal.         Judgment: Judgment normal.      Comments: Very talkative today, normal mood but somewhat anxious               Assessment/Plan   Medicare Risks and Personalized Health Plan  CMS Preventative Services Quick Reference  Cardiovascular risk  Depression/Dysphoria  Diabetic Lab Screening   Inactivity/Sedentary  Obesity/Overweight     The above risks/problems have been discussed with the patient.  Pertinent information has been shared with the patient in the After Visit Summary.  Follow up plans and orders are seen below in the Assessment/Plan Section.    Diagnoses and all orders for this visit:    1. Encounter for Medicare annual wellness exam (Primary)  -     CBC & Differential  -     Comprehensive Metabolic Panel  -     Hemoglobin A1c  -     Lipid Panel With / Chol / HDL Ratio  -      Hepatitis C Antibody  Encouraged patient to sign up for Covid vaccine    2. Essential hypertension  -     CBC & Differential  -     Comprehensive Metabolic Panel  -     Lipid Panel With / Chol / HDL Ratio  Follow heart healthy/low salt diet  Avoid processed foods  Monitor blood pressure as discussed  Exercise as tolerated up to 30 minutes 5 days per week  Take all medications as prescribed    3. Borderline diabetes mellitus  -     Comprehensive Metabolic Panel  -     Hemoglobin A1c  Avoid excessive amounts of sweets  Encourage regular exercise 30 minutes 5 days a week    4. Anxiety associated with depression  Patient continues to have anxiety, however does not appear depressed throughout our interview today  Her family has been quite supportive since her stroke, sister was with her at office visit today    5. Encounter for hepatitis C screening test for low risk patient  -     Hepatitis C Antibody    6. Status post CVA  -     Ambulatory Referral to Physical Therapy Evaluate and treat  Refer to physical therapy for post stroke strengthening and balance exercises    7. Class 3 severe obesity due to excess calories with serious comorbidity and body mass index (BMI) of 40.0 to 44.9 in adult (CMS/formerly [facility])  Patient's Body mass index is 43.35 kg/m². BMI is above normal parameters. Recommendations include: exercise counseling and nutrition counseling.      Follow Up:  Return in about 4 months (around 7/26/2021) for Next scheduled follow up.     An After Visit Summary and PPPS were given to the patient.

## 2021-05-13 ENCOUNTER — TELEPHONE (OUTPATIENT)
Dept: INTERNAL MEDICINE | Facility: CLINIC | Age: 71
End: 2021-05-13

## 2021-05-13 NOTE — TELEPHONE ENCOUNTER
Crystal called and states she needs to have her mailbox to the side of her house due to unable to walk safely to the road to mailbox. She states letter is for post office.   She needs letter to state that due to medical conditions that her mailbox needs to be placed on the side of her house at [address], [place]. She can come pick this up this morning if its ready if not we will need to mail it. Call her at [phone].

## 2021-05-13 NOTE — TELEPHONE ENCOUNTER
Pt called checking on status, advised her it would be ready for  after 1 pm.       Letter has been taken downstairs for

## 2021-06-04 LAB
ALBUMIN SERPL-MCNC: 4.5 G/DL (ref 3.5–5.2)
ALBUMIN/GLOB SERPL: 1.6 G/DL
ALP SERPL-CCNC: 121 U/L (ref 39–117)
ALT SERPL-CCNC: 10 U/L (ref 1–33)
AST SERPL-CCNC: 11 U/L (ref 1–32)
BASOPHILS # BLD AUTO: 0.04 10*3/MM3 (ref 0–0.2)
BASOPHILS NFR BLD AUTO: 0.5 % (ref 0–1.5)
BILIRUB SERPL-MCNC: 0.4 MG/DL (ref 0–1.2)
BUN SERPL-MCNC: 9 MG/DL (ref 8–23)
BUN/CREAT SERPL: 12.5 (ref 7–25)
CALCIUM SERPL-MCNC: 9.7 MG/DL (ref 8.6–10.5)
CHLORIDE SERPL-SCNC: 102 MMOL/L (ref 98–107)
CHOLEST SERPL-MCNC: 154 MG/DL (ref 0–200)
CHOLEST/HDLC SERPL: 3.76 {RATIO}
CO2 SERPL-SCNC: 28.7 MMOL/L (ref 22–29)
CREAT SERPL-MCNC: 0.72 MG/DL (ref 0.57–1)
EOSINOPHIL # BLD AUTO: 0.06 10*3/MM3 (ref 0–0.4)
EOSINOPHIL NFR BLD AUTO: 0.8 % (ref 0.3–6.2)
ERYTHROCYTE [DISTWIDTH] IN BLOOD BY AUTOMATED COUNT: 12.7 % (ref 12.3–15.4)
GLOBULIN SER CALC-MCNC: 2.9 GM/DL
GLUCOSE SERPL-MCNC: 133 MG/DL (ref 65–99)
HBA1C MFR BLD: 6.2 % (ref 4.8–5.6)
HCT VFR BLD AUTO: 45.6 % (ref 34–46.6)
HCV AB S/CO SERPL IA: <0.1 S/CO RATIO (ref 0–0.9)
HDLC SERPL-MCNC: 41 MG/DL (ref 40–60)
HGB BLD-MCNC: 15.4 G/DL (ref 12–15.9)
IMM GRANULOCYTES # BLD AUTO: 0.01 10*3/MM3 (ref 0–0.05)
IMM GRANULOCYTES NFR BLD AUTO: 0.1 % (ref 0–0.5)
LDLC SERPL CALC-MCNC: 88 MG/DL (ref 0–100)
LYMPHOCYTES # BLD AUTO: 2.02 10*3/MM3 (ref 0.7–3.1)
LYMPHOCYTES NFR BLD AUTO: 25.8 % (ref 19.6–45.3)
MCH RBC QN AUTO: 31.2 PG (ref 26.6–33)
MCHC RBC AUTO-ENTMCNC: 33.8 G/DL (ref 31.5–35.7)
MCV RBC AUTO: 92.5 FL (ref 79–97)
MONOCYTES # BLD AUTO: 0.36 10*3/MM3 (ref 0.1–0.9)
MONOCYTES NFR BLD AUTO: 4.6 % (ref 5–12)
NEUTROPHILS # BLD AUTO: 5.33 10*3/MM3 (ref 1.7–7)
NEUTROPHILS NFR BLD AUTO: 68.2 % (ref 42.7–76)
NRBC BLD AUTO-RTO: 0 /100 WBC (ref 0–0.2)
PLATELET # BLD AUTO: 287 10*3/MM3 (ref 140–450)
POTASSIUM SERPL-SCNC: 3.9 MMOL/L (ref 3.5–5.2)
PROT SERPL-MCNC: 7.4 G/DL (ref 6–8.5)
RBC # BLD AUTO: 4.93 10*6/MM3 (ref 3.77–5.28)
SODIUM SERPL-SCNC: 142 MMOL/L (ref 136–145)
TRIGL SERPL-MCNC: 139 MG/DL (ref 0–150)
VLDLC SERPL CALC-MCNC: 25 MG/DL (ref 5–40)
WBC # BLD AUTO: 7.82 10*3/MM3 (ref 3.4–10.8)

## 2021-07-08 RX ORDER — AMLODIPINE BESYLATE 10 MG/1
10 TABLET ORAL
Qty: 90 TABLET | Refills: 1 | Status: SHIPPED | OUTPATIENT
Start: 2021-07-08 | End: 2021-11-29 | Stop reason: SDUPTHER

## 2021-07-08 RX ORDER — CLONIDINE HYDROCHLORIDE 0.1 MG/1
0.1 TABLET ORAL 3 TIMES DAILY
Qty: 270 TABLET | Refills: 1 | Status: SHIPPED | OUTPATIENT
Start: 2021-07-08 | End: 2021-11-29 | Stop reason: SDUPTHER

## 2021-07-08 RX ORDER — LOSARTAN POTASSIUM 50 MG/1
50 TABLET ORAL DAILY
Qty: 90 TABLET | Refills: 1 | Status: SHIPPED | OUTPATIENT
Start: 2021-07-08 | End: 2021-11-29 | Stop reason: SDUPTHER

## 2021-07-08 RX ORDER — DOXAZOSIN MESYLATE 4 MG/1
4 TABLET ORAL DAILY
Qty: 90 TABLET | Refills: 1 | Status: SHIPPED | OUTPATIENT
Start: 2021-07-08 | End: 2021-11-29 | Stop reason: SDUPTHER

## 2021-07-08 RX ORDER — CARVEDILOL 25 MG/1
25 TABLET ORAL 2 TIMES DAILY WITH MEALS
Qty: 180 TABLET | Refills: 1 | Status: SHIPPED | OUTPATIENT
Start: 2021-07-08 | End: 2021-11-29 | Stop reason: SDUPTHER

## 2021-07-08 RX ORDER — ROSUVASTATIN CALCIUM 10 MG/1
10 TABLET, COATED ORAL NIGHTLY
Qty: 90 TABLET | Refills: 1 | Status: SHIPPED | OUTPATIENT
Start: 2021-07-08 | End: 2021-11-29 | Stop reason: SDUPTHER

## 2021-07-08 RX ORDER — HYDROXYZINE HYDROCHLORIDE 25 MG/1
25 TABLET, FILM COATED ORAL EVERY 8 HOURS PRN
Qty: 180 TABLET | Refills: 1 | Status: SHIPPED | OUTPATIENT
Start: 2021-07-08 | End: 2021-10-26

## 2021-07-13 ENCOUNTER — OFFICE VISIT (OUTPATIENT)
Dept: ORTHOPEDIC SURGERY | Facility: CLINIC | Age: 71
End: 2021-07-13

## 2021-07-13 VITALS — BODY MASS INDEX: 43.61 KG/M2 | HEIGHT: 62 IN | WEIGHT: 237 LBS | TEMPERATURE: 98.1 F

## 2021-07-13 DIAGNOSIS — M17.10 ARTHRITIS OF KNEE: ICD-10-CM

## 2021-07-13 DIAGNOSIS — M25.562 ARTHRALGIA OF BOTH KNEES: Primary | ICD-10-CM

## 2021-07-13 DIAGNOSIS — M25.561 ARTHRALGIA OF BOTH KNEES: Primary | ICD-10-CM

## 2021-07-13 PROCEDURE — 99204 OFFICE O/P NEW MOD 45 MIN: CPT | Performed by: ORTHOPAEDIC SURGERY

## 2021-07-13 PROCEDURE — 20610 DRAIN/INJ JOINT/BURSA W/O US: CPT | Performed by: ORTHOPAEDIC SURGERY

## 2021-07-13 RX ORDER — TRIAMCINOLONE ACETONIDE 40 MG/ML
40 INJECTION, SUSPENSION INTRA-ARTICULAR; INTRAMUSCULAR
Status: COMPLETED | OUTPATIENT
Start: 2021-07-13 | End: 2021-07-13

## 2021-07-13 RX ORDER — LIDOCAINE HYDROCHLORIDE 10 MG/ML
2 INJECTION, SOLUTION INFILTRATION; PERINEURAL
Status: COMPLETED | OUTPATIENT
Start: 2021-07-13 | End: 2021-07-13

## 2021-07-13 RX ADMIN — TRIAMCINOLONE ACETONIDE 40 MG: 40 INJECTION, SUSPENSION INTRA-ARTICULAR; INTRAMUSCULAR at 14:07

## 2021-07-13 RX ADMIN — LIDOCAINE HYDROCHLORIDE 2 ML: 10 INJECTION, SOLUTION INFILTRATION; PERINEURAL at 14:07

## 2021-07-13 NOTE — PROGRESS NOTES
Subjective   Patient ID: Crystal Ragsdale is a 70 y.o. female  Pain of the Left Knee and Pain of the Right Knee (Self referral for bilateral knee pain for years. She had a stroke on 8/2/20 and has had trouble walking, uses a cane. )             History of Present Illness  70-year-old with over 6 months of bilateral knee pain right more symptomatic than left, no fall or injury, history of stroke with left-sided involvement in August of last year walks with a cane has no stairs at home, has constant pain with walking weightbearing standing turning both knees mostly lateral side of the right and anteriorly on the left.  Denies calf pain hip pain paresthesias in the lower foot region, does take Tylenol with some improvement has done physical therapy in the past with little improvement.      Review of Systems   Constitutional: Negative for fever.   HENT: Negative for dental problem and voice change.    Eyes: Negative for visual disturbance.   Respiratory: Negative for shortness of breath.    Cardiovascular: Negative for chest pain.   Gastrointestinal: Negative for abdominal pain.   Genitourinary: Negative for dysuria.   Musculoskeletal: Positive for arthralgias, gait problem and joint swelling.   Skin: Negative for rash.   Neurological: Negative for speech difficulty.   Hematological: Does not bruise/bleed easily.   Psychiatric/Behavioral: Negative for confusion.       Past Medical History:   Diagnosis Date   • Anxiety    • Cancer (CMS/HCC)     Breast Cancer   • Hyperlipidemia    • Hypertension    • Impaired functional mobility, balance, gait, and endurance    • Stroke (CMS/HCC)         Past Surgical History:   Procedure Laterality Date   • BREAST LUMPECTOMY Right 2000   • HYSTERECTOMY  1994       Family History   Problem Relation Age of Onset   • Alzheimer's disease Mother    • Cancer Father    • No Known Problems Sister    • No Known Problems Brother        Social History     Socioeconomic History   • Marital status:  "     Spouse name: Not on file   • Number of children: Not on file   • Years of education: Not on file   • Highest education level: Not on file   Tobacco Use   • Smoking status: Current Every Day Smoker     Packs/day: 1.00   • Smokeless tobacco: Never Used   Substance and Sexual Activity   • Alcohol use: No   • Drug use: No   • Sexual activity: Defer       I have reviewed the medical and surgical history, family history, social history, medications, and/or allergies, and the review of systems of this report.    No Known Allergies      Current Outpatient Medications:   •  amLODIPine (NORVASC) 10 MG tablet, Take 1 tablet by mouth Daily., Disp: 90 tablet, Rfl: 1  •  aspirin 81 MG chewable tablet, Chew 1 tablet Daily., Disp: , Rfl:   •  carvedilol (COREG) 25 MG tablet, Take 1 tablet by mouth 2 (Two) Times a Day With Meals., Disp: 180 tablet, Rfl: 1  •  cloNIDine (CATAPRES) 0.1 MG tablet, Take 1 tablet by mouth 3 (Three) Times a Day., Disp: 270 tablet, Rfl: 1  •  doxazosin (CARDURA) 4 MG tablet, Take 1 tablet by mouth Daily., Disp: 90 tablet, Rfl: 1  •  hydrOXYzine (ATARAX) 25 MG tablet, Take 1 tablet by mouth Every 8 (Eight) Hours As Needed for Anxiety., Disp: 180 tablet, Rfl: 1  •  losartan (COZAAR) 50 MG tablet, Take 1 tablet by mouth Daily., Disp: 90 tablet, Rfl: 1  •  rosuvastatin (Crestor) 10 MG tablet, Take 1 tablet by mouth Every Night., Disp: 90 tablet, Rfl: 1    Objective   Temp 98.1 °F (36.7 °C)   Ht 157.5 cm (62\")   Wt 108 kg (237 lb)   BMI 43.35 kg/m²    Physical Exam  Constitutional: Patient is oriented to person, place, and time. Patient appears well-developed and well-nourished.   HENT:Head: Normocephalic and atraumatic.   Eyes: EOM are normal. Pupils are equal, round, and reactive to light.   Neck: Normal range of motion. Neck supple.   Cardiovascular: Normal rate.    Pulmonary/Chest: Effort normal and breath sounds normal.   Abdominal: Soft.   Neurological: Patient is alert and oriented to " person, place, and time.   Skin: Skin is warm and dry.   Psychiatric: Patient has a normal mood and affect.   Nursing note and vitals reviewed.       [unfilled]   Right knee: Valgus alignment of 5 degrees loss of extension 5 degrees full flexion good stability calf supple neurovascular intact skin is intact.  Toma sign negative for medial joint line pain or posterior joint line pain.    Left knee: Full extension flexion 130 good stability neurovascularly intact minimal crepitus throughout the range of motion tenderness localized to the posterior lateral joint line Toma sign negative    Assessment/Plan   Review of Radiographic Studies:    Indication to evaluate joint condition, no comparison views available, shows evident chronic advanced osteoarthritis.      Large Joint Arthrocentesis: R knee  Date/Time: 7/13/2021 2:07 PM  Consent given by: patient  Site marked: site marked  Timeout: Immediately prior to procedure a time out was called to verify the correct patient, procedure, equipment, support staff and site/side marked as required   Supporting Documentation  Indications: pain   Procedure Details  Location: knee - R knee  Preparation: Patient was prepped and draped in the usual sterile fashion  Needle size: 22 G  Medications administered: 40 mg triamcinolone acetonide 40 MG/ML; 2 mL lidocaine 1 %  Patient tolerance: patient tolerated the procedure well with no immediate complications    Large Joint Arthrocentesis: L knee  Date/Time: 7/13/2021 2:07 PM  Consent given by: patient  Site marked: site marked  Timeout: Immediately prior to procedure a time out was called to verify the correct patient, procedure, equipment, support staff and site/side marked as required   Supporting Documentation  Indications: pain   Procedure Details  Location: knee - L knee  Preparation: Patient was prepped and draped in the usual sterile fashion  Needle size: 22 G  Medications administered: 40 mg triamcinolone acetonide 40  MG/ML; 2 mL lidocaine 1 %  Patient tolerance: patient tolerated the procedure well with no immediate complications           Diagnoses and all orders for this visit:    1. Arthralgia of both knees (Primary)  -     XR Knee 3 View Bilateral; Future    2. Arthritis of knee       Orthopedic activities reviewed and patient expressed appreciation, Risk, benefits, and merits of treatment alternatives reviewed with the patient and questions answered and Using illustrations and models, the nature of the pathology was explained to the patient      Recommendations/Plan:   Work/Activity Status: May perform usual activities as tolerated    Patient agreeable to call or return sooner for any concerns.         I reviewed the patient's radiographs indicating advanced osteoarthritis discussed the natural history treatment options and pros and cons and risks and complications of surgical and nonsurgical care.  I also reviewed advantages and disadvantages risks and complications of steroid injections versus viscus gel injections.  The patient had opportunity to ask questions which were answered.    Also reviewed with her indications and the nature of the procedure of total knee arthroplasty    Impression:  Bilateral knee osteoarthritis right worse than left valgus type  Plan:  Return as needed for repeat steroid injection

## 2021-07-26 ENCOUNTER — OFFICE VISIT (OUTPATIENT)
Dept: INTERNAL MEDICINE | Facility: CLINIC | Age: 71
End: 2021-07-26

## 2021-07-26 VITALS
HEART RATE: 84 BPM | OXYGEN SATURATION: 96 % | WEIGHT: 237 LBS | BODY MASS INDEX: 43.61 KG/M2 | HEIGHT: 62 IN | TEMPERATURE: 97 F | DIASTOLIC BLOOD PRESSURE: 90 MMHG | SYSTOLIC BLOOD PRESSURE: 144 MMHG

## 2021-07-26 DIAGNOSIS — F41.8 ANXIETY ASSOCIATED WITH DEPRESSION: ICD-10-CM

## 2021-07-26 DIAGNOSIS — R73.03 BORDERLINE DIABETES MELLITUS: ICD-10-CM

## 2021-07-26 DIAGNOSIS — M17.10 ARTHRITIS OF KNEE: ICD-10-CM

## 2021-07-26 DIAGNOSIS — I10 ESSENTIAL HYPERTENSION: Primary | ICD-10-CM

## 2021-07-26 LAB
POC CREATININE URINE: 10
POC MICROALBUMIN URINE: 10

## 2021-07-26 PROCEDURE — 82044 UR ALBUMIN SEMIQUANTITATIVE: CPT | Performed by: INTERNAL MEDICINE

## 2021-07-26 PROCEDURE — 99214 OFFICE O/P EST MOD 30 MIN: CPT | Performed by: INTERNAL MEDICINE

## 2021-07-26 NOTE — PROGRESS NOTES
Chief Complaint   Patient presents with   • Follow-up     for anxiety and HTN.      Subjective   Crystal Ragsdale is a 70 y.o. female.     Here today for follow-up of hypertension, recent CVA, borderline diabetes, anxiety and depression, history of breast cancer.  HTN/HLD/CVA-BP is borderline high today as usual due to underlying anxiety.  No recent CP, SOA, palpitations, headache or edema. She is compliant with Crestor for hyperlipidemia  Prediabetes-A1c 6.2 approximately 2 months ago.  She does not follow a specific diet for her prediabetes or cholesterol issue.  She does not exercise, states she is unable to due to underlying arthritis in her knees  Anxiety/depression-  She is not having much anxiety or depression. She is sleeping well at night-she dreams a lot    History of breast cancer-last documented mammogram April 2019-has it scheduled in August.  DJD- she has had some shots in her knees a few months ago.  She continues to have pain though and does not ambulate much.  HCM- she declines all vaccines.  Mammogram is scheduled for August.  Declines colonoscopy.       The following portions of the patient's history were reviewed and updated as appropriate: allergies, current medications, past family history, past medical history, past social history, past surgical history and problem list.    Review of Systems   Constitutional: Negative for activity change, appetite change and unexpected weight change.   Eyes: Negative for visual disturbance.   Respiratory: Positive for shortness of breath.    Cardiovascular: Negative for chest pain, palpitations and leg swelling.   Gastrointestinal: Negative for abdominal pain.   Genitourinary: Negative for hematuria.   Musculoskeletal: Positive for arthralgias and gait problem. Negative for back pain.   Neurological: Negative for headaches.   Psychiatric/Behavioral: Negative for dysphoric mood and sleep disturbance. The patient is nervous/anxious.        Objective   /90    "Pulse 84   Temp 97 °F (36.1 °C)   Ht 157.5 cm (62\")   Wt 108 kg (237 lb)   SpO2 96%   BMI 43.35 kg/m²   Body mass index is 43.35 kg/m².  Physical Exam  Vitals and nursing note reviewed.   Constitutional:       General: She is not in acute distress.     Appearance: Normal appearance. She is well-developed. She is not ill-appearing.      Comments: Kind and pleasant female, appears stated age and in NAD today   HENT:      Head: Normocephalic and atraumatic.      Right Ear: External ear normal.      Left Ear: External ear normal.   Eyes:      General:         Right eye: No discharge.         Left eye: No discharge.      Extraocular Movements: Extraocular movements intact.      Conjunctiva/sclera: Conjunctivae normal.      Pupils: Pupils are equal, round, and reactive to light.   Neck:      Thyroid: No thyromegaly.      Vascular: No carotid bruit.      Comments: No thyromegaly or mass  Cardiovascular:      Rate and Rhythm: Normal rate and regular rhythm.      Pulses: Normal pulses.      Heart sounds: Normal heart sounds. No murmur heard.     Pulmonary:      Effort: Pulmonary effort is normal. No respiratory distress.      Breath sounds: Normal breath sounds. No wheezing.   Abdominal:      General: Bowel sounds are normal. There is no distension.      Palpations: Abdomen is soft.      Tenderness: There is no abdominal tenderness.   Musculoskeletal:         General: Normal range of motion.      Cervical back: Normal range of motion and neck supple.      Right lower leg: Edema present.      Left lower leg: Edema present.      Comments: +1 edema at ankles   Lymphadenopathy:      Cervical: No cervical adenopathy.   Skin:     General: Skin is warm.      Findings: No rash.   Neurological:      General: No focal deficit present.      Mental Status: She is alert and oriented to person, place, and time. Mental status is at baseline.      Cranial Nerves: No cranial nerve deficit.      Motor: No weakness.      Coordination: " Coordination normal.      Gait: Gait abnormal.      Comments: Ambulates with use of a cane   Psychiatric:         Behavior: Behavior normal.         Thought Content: Thought content normal.         Judgment: Judgment normal.      Comments: Patient is anxious and slightly irritable         Assessment/Plan   Crystal Ragsdale is here today and the following problems have been addressed:      Diagnoses and all orders for this visit:    1. Essential hypertension (Primary)    2. Borderline diabetes mellitus  -     POCT microalbumin    3. Anxiety associated with depression    4. Arthritis of knee        Follow heart healthy/low salt/diabetic diet  Avoid processed foods  Monitor blood pressure and BS as discussed  Exercise as tolerated up to 150 minutes per week-again encouraged her to do chair exercises for 15 to 30 minutes 5 days a week  Take all medications as prescribed  See eye doctor annually or as discussed  Continue Crestor for history of CVA  Continue hydroxyzine as needed for underlying anxiety  Follow-up with orthopedics for osteoarthritis of knees, she has steroid injection every 3 to 4 months as needed, encouraged regular exercises to help with this also  She declines all vaccines  Mammogram is scheduled  She declines colonoscopy also      Return in about 4 months (around 11/26/2021) for Next scheduled follow up.      Marilyn K. Vermeesch, MD      Please note that portions of this note were completed with a voice recognition program.  Efforts were made to edit dictation, but occasionally words are mistranscribed.

## 2021-08-11 ENCOUNTER — TELEPHONE (OUTPATIENT)
Dept: INTERNAL MEDICINE | Facility: CLINIC | Age: 71
End: 2021-08-11

## 2021-08-11 NOTE — TELEPHONE ENCOUNTER
----- Message from Marilyn K Vermeesch, MD sent at 8/9/2021  6:00 PM EDT -----  Regarding: Mammogram  Normal mammogram  ----- Message -----  From: Sammy Rush  Sent: 8/9/2021   3:07 PM EDT  To: Marilyn K Vermeesch, MD

## 2021-10-26 RX ORDER — HYDROXYZINE HYDROCHLORIDE 25 MG/1
TABLET, FILM COATED ORAL
Qty: 180 TABLET | Refills: 1 | Status: SHIPPED | OUTPATIENT
Start: 2021-10-26 | End: 2022-05-18 | Stop reason: SDUPTHER

## 2021-11-29 ENCOUNTER — OFFICE VISIT (OUTPATIENT)
Dept: INTERNAL MEDICINE | Facility: CLINIC | Age: 71
End: 2021-11-29

## 2021-11-29 VITALS
WEIGHT: 246 LBS | SYSTOLIC BLOOD PRESSURE: 150 MMHG | OXYGEN SATURATION: 94 % | DIASTOLIC BLOOD PRESSURE: 80 MMHG | TEMPERATURE: 97.3 F | BODY MASS INDEX: 45.27 KG/M2 | HEIGHT: 62 IN | HEART RATE: 81 BPM

## 2021-11-29 DIAGNOSIS — I10 ESSENTIAL HYPERTENSION: Primary | ICD-10-CM

## 2021-11-29 DIAGNOSIS — R73.03 BORDERLINE DIABETES MELLITUS: ICD-10-CM

## 2021-11-29 DIAGNOSIS — F41.8 ANXIETY ASSOCIATED WITH DEPRESSION: ICD-10-CM

## 2021-11-29 PROBLEM — Z11.59 ENCOUNTER FOR HEPATITIS C SCREENING TEST FOR LOW RISK PATIENT: Status: RESOLVED | Noted: 2021-03-26 | Resolved: 2021-11-29

## 2021-11-29 PROCEDURE — 99214 OFFICE O/P EST MOD 30 MIN: CPT | Performed by: INTERNAL MEDICINE

## 2021-11-29 RX ORDER — LOSARTAN POTASSIUM 50 MG/1
50 TABLET ORAL DAILY
Qty: 90 TABLET | Refills: 1 | Status: SHIPPED | OUTPATIENT
Start: 2021-11-29 | End: 2022-05-18 | Stop reason: SDUPTHER

## 2021-11-29 RX ORDER — ROSUVASTATIN CALCIUM 10 MG/1
10 TABLET, COATED ORAL NIGHTLY
Qty: 90 TABLET | Refills: 1 | Status: SHIPPED | OUTPATIENT
Start: 2021-11-29 | End: 2021-11-29

## 2021-11-29 RX ORDER — CARVEDILOL 25 MG/1
25 TABLET ORAL 2 TIMES DAILY WITH MEALS
Qty: 180 TABLET | Refills: 1 | Status: SHIPPED | OUTPATIENT
Start: 2021-11-29 | End: 2022-05-18 | Stop reason: SDUPTHER

## 2021-11-29 RX ORDER — CLONIDINE HYDROCHLORIDE 0.1 MG/1
0.1 TABLET ORAL 3 TIMES DAILY
Qty: 270 TABLET | Refills: 1 | Status: SHIPPED | OUTPATIENT
Start: 2021-11-29 | End: 2022-05-18 | Stop reason: SDUPTHER

## 2021-11-29 RX ORDER — ROSUVASTATIN CALCIUM 10 MG/1
TABLET, COATED ORAL
Qty: 90 TABLET | Refills: 1 | Status: SHIPPED | OUTPATIENT
Start: 2021-11-29 | End: 2022-05-18 | Stop reason: SDUPTHER

## 2021-11-29 RX ORDER — DOXAZOSIN MESYLATE 4 MG/1
4 TABLET ORAL DAILY
Qty: 90 TABLET | Refills: 1 | Status: SHIPPED | OUTPATIENT
Start: 2021-11-29 | End: 2022-05-18 | Stop reason: SDUPTHER

## 2021-11-29 RX ORDER — AMLODIPINE BESYLATE 10 MG/1
10 TABLET ORAL
Qty: 90 TABLET | Refills: 1 | Status: SHIPPED | OUTPATIENT
Start: 2021-11-29 | End: 2022-05-18 | Stop reason: SDUPTHER

## 2021-11-29 NOTE — PROGRESS NOTES
Chief Complaint   Patient presents with   • Follow-up     for anxiety, depression, and HTN. Pt states her legs are stiff, can not stand long, or walk for a long time.     Subjective   Crystal Ragsdale is a 71 y.o. female.     Here today for follow-up of hypertension, recent CVA, borderline diabetes, anxiety and depression, history of breast cancer.  HTN/HLD/CVA-BP is borderline high today as usual due to underlying anxiety coming to office.  No recent CP, SOA, palpitations, headache. She is not taking her Crestor as she felt it was causing leg stiffness.  She continues to have leg stiffness though.  She states that co-Q10 cause severe nightmares so she stopped taking this supplement.  She states she still has some numbness in her left hand since CVA.   Prediabetes-A1c 6.2 approximately 5 months ago.  She does not follow a specific diet for her prediabetes or cholesterol issue, but she did cut back on her sweets, pop.  She does not exercise, states she is unable to due to underlying arthritis in her knees, but does try to ride her bicycle.    Anxiety/depression-  She is not having much anxiety or depression on a day-to-day basis. She is sleeping well at night-she dreams a lot    History of breast cancer-last documented mammogram April 2019-has it scheduled in August.  DJD- She continues to have pain in her knees and does not ambulate much.  She is using a cane to ambulate  HCM- she declines all vaccines but did get the COVID vaccine.  Mammogram is UTD.  Declines colonoscopy.  She is complaining of diffuse stiffness mostly in her knees and hips.  No pain in her joints.  Her feet burn.   She is not currently smoking.        The following portions of the patient's history were reviewed and updated as appropriate: allergies, current medications, past family history, past medical history, past social history, past surgical history and problem list.    Review of Systems   Constitutional: Negative for activity change, appetite  "change and unexpected weight change.   Eyes: Negative for visual disturbance.   Respiratory: Negative for shortness of breath.    Cardiovascular: Negative for chest pain, palpitations and leg swelling.   Gastrointestinal: Negative for abdominal pain.   Genitourinary: Negative for hematuria.   Musculoskeletal: Positive for arthralgias and gait problem. Negative for back pain.        Joints are stiff   Neurological: Positive for numbness. Negative for headaches.        Feet burn  Numbness in left hand since CVA   Psychiatric/Behavioral: Negative for dysphoric mood and sleep disturbance. The patient is nervous/anxious.        Objective   /80   Pulse 81   Temp 97.3 °F (36.3 °C)   Ht 157.5 cm (62\")   Wt 112 kg (246 lb)   SpO2 94%   BMI 44.99 kg/m²   Body mass index is 44.99 kg/m².  Physical Exam  Vitals and nursing note reviewed.   Constitutional:       General: She is not in acute distress.     Appearance: Normal appearance. She is well-developed. She is obese. She is not ill-appearing.      Comments: Kind and pleasant female, appears stated age and in NAD today   HENT:      Head: Normocephalic and atraumatic.      Right Ear: External ear normal.      Left Ear: External ear normal.   Eyes:      General:         Right eye: No discharge.         Left eye: No discharge.      Extraocular Movements: Extraocular movements intact.      Conjunctiva/sclera: Conjunctivae normal.      Pupils: Pupils are equal, round, and reactive to light.   Neck:      Thyroid: No thyromegaly.      Vascular: No carotid bruit.      Comments: No thyromegaly or mass  Cardiovascular:      Rate and Rhythm: Normal rate and regular rhythm.      Pulses: Normal pulses.      Heart sounds: Normal heart sounds. No murmur heard.      Pulmonary:      Effort: Pulmonary effort is normal. No respiratory distress.      Breath sounds: Normal breath sounds. No wheezing.   Abdominal:      General: Bowel sounds are normal. There is no distension.      " Palpations: Abdomen is soft.      Tenderness: There is no abdominal tenderness.   Musculoskeletal:         General: Normal range of motion.      Cervical back: Normal range of motion and neck supple.      Right lower leg: Edema present.      Left lower leg: Edema present.      Comments: Trace edema in feet bilaterally   Lymphadenopathy:      Cervical: No cervical adenopathy.   Skin:     General: Skin is warm and dry.      Findings: No rash.      Comments: Distal lower extremities and feet are extremely dry   Neurological:      General: No focal deficit present.      Mental Status: She is alert and oriented to person, place, and time. Mental status is at baseline.      Cranial Nerves: No cranial nerve deficit.      Motor: Weakness present.      Coordination: Coordination normal.      Gait: Gait abnormal.      Comments: Weakness in lower extremities with stiffness upon standing, patient ambulates with use of a cane   Psychiatric:         Mood and Affect: Mood normal.         Behavior: Behavior normal.         Thought Content: Thought content normal.         Judgment: Judgment normal.         Assessment/Plan   Crystal Ragsdale is here today and the following problems have been addressed:      Diagnoses and all orders for this visit:    1. Essential hypertension (Primary)    2. Borderline diabetes mellitus  -     Comprehensive Metabolic Panel  -     Hemoglobin A1c    3. Anxiety associated with depression    Other orders  -     amLODIPine (NORVASC) 10 MG tablet; Take 1 tablet by mouth Daily.  Dispense: 90 tablet; Refill: 1  -     carvedilol (COREG) 25 MG tablet; Take 1 tablet by mouth 2 (Two) Times a Day With Meals.  Dispense: 180 tablet; Refill: 1  -     cloNIDine (CATAPRES) 0.1 MG tablet; Take 1 tablet by mouth 3 (Three) Times a Day.  Dispense: 270 tablet; Refill: 1  -     doxazosin (CARDURA) 4 MG tablet; Take 1 tablet by mouth Daily.  Dispense: 90 tablet; Refill: 1  -     losartan (COZAAR) 50 MG tablet; Take 1 tablet by  mouth Daily.  Dispense: 90 tablet; Refill: 1  -     Discontinue: rosuvastatin (Crestor) 10 MG tablet; Take 1 tablet by mouth Every Night.  Dispense: 90 tablet; Refill: 1  -     rosuvastatin (Crestor) 10 MG tablet; Take on MWF night only  Dispense: 90 tablet; Refill: 1        Follow heart healthy/low salt/diabetic diet  Avoid processed foods  Monitor blood pressure on occasion  Exercise as tolerated -we discussed doing chair exercises for 15 minutes twice daily at least 5 days a week  Take all medications as prescribed  See eye doctor annually or as discussed  Wear protective foot wear/no bare feet  Labs as noted  Her anxiety is doing better overall, although she still has white coat syndrome upon coming to office  Patient is in agreement to resume Crestor 10 mg on Mondays, Wednesdays and Friday nights.  She will not take co-Q10 supplement though due to vivid nightmares  If she continues to have significant leg stiffness we will consider changing Crestor to a different cholesterol medication    Return in about 4 months (around 3/29/2022) for Medicare Wellness.      Marilyn K. Vermeesch, MD      Please note that portions of this note were completed with a voice recognition program.  Efforts were made to edit dictation, but occasionally words are mistranscribed.

## 2021-11-30 LAB
ALBUMIN SERPL-MCNC: 4.3 G/DL (ref 3.5–5.2)
ALBUMIN/GLOB SERPL: 1.4 G/DL
ALP SERPL-CCNC: 107 U/L (ref 39–117)
ALT SERPL-CCNC: 12 U/L (ref 1–33)
AST SERPL-CCNC: 15 U/L (ref 1–32)
BILIRUB SERPL-MCNC: 0.3 MG/DL (ref 0–1.2)
BUN SERPL-MCNC: 7 MG/DL (ref 8–23)
BUN/CREAT SERPL: 10.9 (ref 7–25)
CALCIUM SERPL-MCNC: 9.4 MG/DL (ref 8.6–10.5)
CHLORIDE SERPL-SCNC: 102 MMOL/L (ref 98–107)
CO2 SERPL-SCNC: 26.4 MMOL/L (ref 22–29)
CREAT SERPL-MCNC: 0.64 MG/DL (ref 0.57–1)
GLOBULIN SER CALC-MCNC: 3 GM/DL
GLUCOSE SERPL-MCNC: 111 MG/DL (ref 65–99)
HBA1C MFR BLD: 6.5 % (ref 4.8–5.6)
POTASSIUM SERPL-SCNC: 3.7 MMOL/L (ref 3.5–5.2)
PROT SERPL-MCNC: 7.3 G/DL (ref 6–8.5)
SODIUM SERPL-SCNC: 140 MMOL/L (ref 136–145)

## 2021-11-30 RX ORDER — METFORMIN HYDROCHLORIDE 500 MG/1
500 TABLET, EXTENDED RELEASE ORAL
Qty: 90 TABLET | Refills: 1 | Status: SHIPPED | OUTPATIENT
Start: 2021-11-30 | End: 2022-03-29

## 2021-11-30 NOTE — PROGRESS NOTES
Please call patient with lab results.  A1c has increased to 6.5 and is consistent with blood sugars of 140.  Blood sugar on day of lab draw was 111.  Kidney function and liver function are normal.  Please tell her I have sent in Metformin  mg to be taken once daily with breakfast.  Please ask her to follow through with plan discussed at office visit in regards to chair exercises for 15 minutes twice daily.  She can find these on YouTube and follow along with exercises.  Please offer her a nutrition consult for diabetes diet and if she is interested let me know and I will place order, or please check to see if health department is still doing diabetic classes.  Please tell her to avoid excessive amounts of sweets and cut amount of breads, pasta, potatoes and rice in half.  Thank you

## 2022-01-21 ENCOUNTER — TELEPHONE (OUTPATIENT)
Dept: INTERNAL MEDICINE | Facility: CLINIC | Age: 72
End: 2022-01-21

## 2022-01-21 NOTE — TELEPHONE ENCOUNTER
Caller: Crystal Ragsdale    Relationship: Self    Best call back number: [phone]    What is the best time to reach you: AS SOON AS POSSIBLE    Who are you requesting to speak with (clinical staff, provider,  specific staff member): CLINICAL    Do you know the name of the person who called: NA    What was the call regarding: PATIENT REQUESTING DENISE TO CALL HER BACK.  PATIENT STATED SHE HAS MOVED TO ANOTHER PLACE, AND NEEDS HER MAIL BROUGHT TO HER DOOR AND NEEDS ANOTHER LETTER.    Do you require a callback: YES

## 2022-03-29 ENCOUNTER — OFFICE VISIT (OUTPATIENT)
Dept: INTERNAL MEDICINE | Facility: CLINIC | Age: 72
End: 2022-03-29

## 2022-03-29 VITALS
DIASTOLIC BLOOD PRESSURE: 84 MMHG | BODY MASS INDEX: 43.24 KG/M2 | WEIGHT: 235 LBS | TEMPERATURE: 97.3 F | HEART RATE: 80 BPM | SYSTOLIC BLOOD PRESSURE: 148 MMHG | HEIGHT: 62 IN | OXYGEN SATURATION: 97 %

## 2022-03-29 DIAGNOSIS — Z00.00 ENCOUNTER FOR MEDICARE ANNUAL WELLNESS EXAM: Primary | ICD-10-CM

## 2022-03-29 DIAGNOSIS — R73.03 BORDERLINE DIABETES MELLITUS: ICD-10-CM

## 2022-03-29 DIAGNOSIS — I10 ESSENTIAL HYPERTENSION: ICD-10-CM

## 2022-03-29 DIAGNOSIS — F41.8 ANXIETY ASSOCIATED WITH DEPRESSION: ICD-10-CM

## 2022-03-29 DIAGNOSIS — Z86.73 STATUS POST CVA: ICD-10-CM

## 2022-03-29 PROBLEM — I16.0 HYPERTENSIVE URGENCY: Status: RESOLVED | Noted: 2020-08-03 | Resolved: 2022-03-29

## 2022-03-29 PROCEDURE — G0439 PPPS, SUBSEQ VISIT: HCPCS | Performed by: INTERNAL MEDICINE

## 2022-03-29 PROCEDURE — 1126F AMNT PAIN NOTED NONE PRSNT: CPT | Performed by: INTERNAL MEDICINE

## 2022-03-29 PROCEDURE — 96160 PT-FOCUSED HLTH RISK ASSMT: CPT | Performed by: INTERNAL MEDICINE

## 2022-03-29 PROCEDURE — 99397 PER PM REEVAL EST PAT 65+ YR: CPT | Performed by: INTERNAL MEDICINE

## 2022-03-29 PROCEDURE — 1170F FXNL STATUS ASSESSED: CPT | Performed by: INTERNAL MEDICINE

## 2022-03-29 PROCEDURE — 1159F MED LIST DOCD IN RCRD: CPT | Performed by: INTERNAL MEDICINE

## 2022-03-29 RX ORDER — GLIMEPIRIDE 1 MG/1
1 TABLET ORAL
Qty: 90 TABLET | Refills: 3 | Status: SHIPPED | OUTPATIENT
Start: 2022-03-29 | End: 2023-03-30 | Stop reason: SDUPTHER

## 2022-03-29 NOTE — PROGRESS NOTES
The ABCs of the Annual Wellness Visit  Subsequent Medicare Wellness Visit    Chief Complaint   Patient presents with   • Medicare Wellness-subsequent     Pt states she stopped metformin due to diarrhea.   • Annual Exam      Subjective    History of Present Illness:  Crystal Ragsdale is a 71 y.o. female who presents for a Subsequent Medicare Wellness Visit and annual physical exam.  PMH of HTN, diabetes, history of breast cancer, anxiety and depression, arthritis and CVA.  Last mammogram August 2021.  No documented colonoscopy.  Patient has had 2 COVID vaccines but no booster.  She refuses all other vaccines.  BP is elevated as usual due to whitecoat syndrome and anxiety.  She denies any CP, palpitations or edema, has chronic dyspnea on exertion.  Patient states she quit taking her Metformin due to loose stools, however, did not bother to call and discuss this with us.  Last A1c was 6.5 approximately 4 months ago.  She complains that her legs and hips feel weak when she is up and moving around.  She has poor balance.  These sxs have occurred since her CVA, especially her left sided sxs.  She has stopped eating sweets, drinking pop, sweet tea.  She has been drinking mostly water.  She says she is lonesome.  Her family does not visit often.      The following portions of the patient's history were reviewed and   updated as appropriate: allergies, current medications, past family history, past medical history, past social history, past surgical history and problem list.    Compared to one year ago, the patient feels her physical   health is the same.    Compared to one year ago, the patient feels her mental   health is the same.    Recent Hospitalizations:  She was not admitted to the hospital during the last year.       Current Medical Providers:  Patient Care Team:  Vermeesch, Marilyn K, MD as PCP - General (Internal Medicine & Pediatrics)    Outpatient Medications Prior to Visit   Medication Sig Dispense Refill   •  amLODIPine (NORVASC) 10 MG tablet Take 1 tablet by mouth Daily. 90 tablet 1   • aspirin 81 MG chewable tablet Chew 1 tablet Daily.     • carvedilol (COREG) 25 MG tablet Take 1 tablet by mouth 2 (Two) Times a Day With Meals. 180 tablet 1   • cloNIDine (CATAPRES) 0.1 MG tablet Take 1 tablet by mouth 3 (Three) Times a Day. 270 tablet 1   • doxazosin (CARDURA) 4 MG tablet Take 1 tablet by mouth Daily. 90 tablet 1   • hydrOXYzine (ATARAX) 25 MG tablet TAKE ONE TABLET BY MOUTH EVERY 8 HOURS AS NEEDED FOR ANXIETY 180 tablet 1   • losartan (COZAAR) 50 MG tablet Take 1 tablet by mouth Daily. 90 tablet 1   • rosuvastatin (Crestor) 10 MG tablet Take on MWF night only 90 tablet 1   • metFORMIN ER (Glucophage XR) 500 MG 24 hr tablet Take 1 tablet by mouth Daily With Breakfast. 90 tablet 1     No facility-administered medications prior to visit.       No opioid medication identified on active medication list. I have reviewed chart for other potential  high risk medication/s and harmful drug interactions in the elderly.          Aspirin is on active medication list. Aspirin use is indicated based on review of current medical condition/s. Pros and cons of this therapy have been discussed today. Benefits of this medication outweigh potential harm.  Patient has been encouraged to continue taking this medication.  .      Patient Active Problem List   Diagnosis   • Essential hypertension   • Anxiety associated with depression   • Encounter for Medicare annual wellness exam   • Tobacco abuse counseling   • Chronic pain of left ankle   • Colon cancer screening   • History of breast cancer   • Borderline diabetes mellitus   • Status post CVA   • Arthritis of knee     Advance Care Planning  Advance Directive is on file.  ACP discussion was held with the patient during this visit. Patient has an advance directive in EMR which is still valid.     Review of Systems   Constitutional: Positive for fatigue.   HENT: Negative.    Eyes: Negative.   "  Respiratory: Positive for shortness of breath.    Cardiovascular: Negative.    Gastrointestinal: Negative.    Endocrine: Negative.    Genitourinary: Negative.    Musculoskeletal: Positive for arthralgias, back pain and gait problem.   Skin: Negative.    Allergic/Immunologic: Negative.    Neurological: Positive for weakness and numbness.   Hematological: Negative.    Psychiatric/Behavioral: Negative for dysphoric mood and sleep disturbance. The patient is nervous/anxious.         Objective    Vitals:    03/29/22 1334   BP: 148/84   Pulse: 80   Temp: 97.3 °F (36.3 °C)   SpO2: 97%   Weight: 107 kg (235 lb)   Height: 157.5 cm (62\")   PainSc: 0-No pain     BMI Readings from Last 1 Encounters:   03/29/22 42.98 kg/m²   BMI is above normal parameters. Recommendations include: exercise counseling and nutrition counseling    Does the patient have evidence of cognitive impairment? No    Physical Exam  Vitals and nursing note reviewed.   Constitutional:       General: She is not in acute distress.     Appearance: Normal appearance. She is well-developed. She is obese. She is not ill-appearing.      Comments: Kind and pleasant female, appears stated age and in no distress today   HENT:      Head: Normocephalic and atraumatic.      Right Ear: Tympanic membrane, ear canal and external ear normal.      Left Ear: Tympanic membrane, ear canal and external ear normal.      Nose: No congestion.      Mouth/Throat:      Pharynx: No posterior oropharyngeal erythema.   Eyes:      General:         Right eye: No discharge.         Left eye: No discharge.      Extraocular Movements: Extraocular movements intact.      Conjunctiva/sclera: Conjunctivae normal.      Pupils: Pupils are equal, round, and reactive to light.   Neck:      Thyroid: No thyromegaly.      Vascular: No carotid bruit.      Comments: No thyromegaly or mass  Cardiovascular:      Rate and Rhythm: Normal rate and regular rhythm.      Pulses: Normal pulses.      Heart sounds: " Normal heart sounds. No murmur heard.  Pulmonary:      Effort: Pulmonary effort is normal. No respiratory distress.      Breath sounds: Normal breath sounds. No wheezing.   Abdominal:      General: Bowel sounds are normal. There is no distension.      Palpations: Abdomen is soft.      Tenderness: There is no abdominal tenderness.   Musculoskeletal:         General: Normal range of motion.      Cervical back: Normal range of motion and neck supple.      Right lower leg: No edema.      Left lower leg: No edema.      Comments: Muscle strength 5 -/5 in left upper and lower extremities   Lymphadenopathy:      Cervical: No cervical adenopathy.   Skin:     General: Skin is warm.      Findings: No rash.      Comments: Feet with no callus or ulcer, sensation grossly intact to light touch bilaterally   Neurological:      General: No focal deficit present.      Mental Status: She is alert and oriented to person, place, and time. Mental status is at baseline.      Cranial Nerves: No cranial nerve deficit.      Motor: Weakness present.      Coordination: Coordination normal.      Gait: Gait abnormal.      Comments: Ambulates with use of a cane due to sensation of poor balance   Psychiatric:         Mood and Affect: Mood normal.         Behavior: Behavior normal.         Thought Content: Thought content normal.         Judgment: Judgment normal.                 HEALTH RISK ASSESSMENT    Smoking Status:  Social History     Tobacco Use   Smoking Status Current Every Day Smoker   • Packs/day: 1.00   Smokeless Tobacco Never Used     Alcohol Consumption:  Social History     Substance and Sexual Activity   Alcohol Use No     Fall Risk Screen:    FLAKITO Fall Risk Assessment was completed, and patient is at MODERATE risk for falls. Assessment completed on:3/29/2022    Depression Screening:  PHQ-2/PHQ-9 Depression Screening 3/29/2022   Retired Total Score -   Little Interest or Pleasure in Doing Things 0-->not at all   Feeling Down,  Depressed or Hopeless 0-->not at all   PHQ-9: Brief Depression Severity Measure Score 0       Health Habits and Functional and Cognitive Screening:  Functional & Cognitive Status 3/29/2022   Do you have difficulty preparing food and eating? Yes   Do you have difficulty bathing yourself, getting dressed or grooming yourself? Yes   Do you have difficulty using the toilet? No   Do you have difficulty moving around from place to place? Yes   Do you have trouble with steps or getting out of a bed or a chair? Yes   Current Diet Well Balanced Diet   Dental Exam Not up to date   Eye Exam Not up to date   Exercise (times per week) 0 times per week   Current Exercises Include No Regular Exercise   Current Exercise Activities Include -   Do you need help using the phone?  No   Are you deaf or do you have serious difficulty hearing?  No   Do you need help with transportation? No   Do you need help shopping? Yes   Do you need help preparing meals?  No   Do you need help with housework?  Yes   Do you need help with laundry? No   Do you need help taking your medications? No   Do you need help managing money? No   Do you ever drive or ride in a car without wearing a seat belt? No   Have you felt unusual stress, anger or loneliness in the last month? No   Who do you live with? Alone   If you need help, do you have trouble finding someone available to you? No   Have you been bothered in the last four weeks by sexual problems? No   Do you have difficulty concentrating, remembering or making decisions? No       Age-appropriate Screening Schedule:  Refer to the list below for future screening recommendations based on patient's age, sex and/or medical conditions. Orders for these recommended tests are listed in the plan section. The patient has been provided with a written plan.    Health Maintenance   Topic Date Due   • DXA SCAN  Never done   • ZOSTER VACCINE (1 of 2) Never done   • DIABETIC EYE EXAM  Never done   • INFLUENZA VACCINE   11/29/2022 (Originally 8/1/2021)   • HEMOGLOBIN A1C  05/29/2022   • LIPID PANEL  06/03/2022   • URINE MICROALBUMIN  07/26/2022   • DIABETIC FOOT EXAM  11/29/2022   • MAMMOGRAM  08/05/2023   • PAP SMEAR  Discontinued   • TDAP/TD VACCINES  Discontinued              Assessment/Plan   CMS Preventative Services Quick Reference  Risk Factors Identified During Encounter  Fall Risk-High or Moderate  Inactivity/Sedentary  Obesity/Overweight   The above risks/problems have been discussed with the patient.  Follow up actions/plans if indicated are seen below in the Assessment/Plan Section.  Pertinent information has been shared with the patient in the After Visit Summary.    Diagnoses and all orders for this visit:    1. Encounter for Medicare annual wellness exam (Primary)    2. Essential hypertension  -     CBC & Differential  -     Comprehensive Metabolic Panel  -     Lipid Panel With / Chol / HDL Ratio    3. Borderline diabetes mellitus  -     Comprehensive Metabolic Panel  -     Hemoglobin A1c    4. Anxiety associated with depression    5. Status post CVA  -     Ambulatory Referral to Physical Therapy Evaluate and treat    Other orders  -     glimepiride (Amaryl) 1 MG tablet; Take 1 tablet by mouth Every Morning Before Breakfast.  Dispense: 90 tablet; Refill: 3    Follow heart healthy/low salt/low cholesterol/diabetic diet  Avoid processed foods  Monitor blood pressure as discussed  Exercise as tolerated up to 30 minutes 5 days per week  Take all medications as prescribed  Will refer to physical therapy for chronic left-sided weakness and poor balance since CVA, patient complains primarily of balance issues and is agreeable to try more physical therapy  Encourage her to continue monitoring blood sugar, if blood sugar is greater than 150, she is to take Amaryl 1 mg on those days  Depression and anxiety are currently stable, she states she is sleeping well and denies any current symptoms of depression  Encouraged her to  take Crestor at least 3 days a week  She declines all vaccines and does not want COVID booster  Encourage annual eye exams due to underlying diabetes  Avoid bare feet, monitor feet for callus and ulcer  Patient states she will continue annual mammogram screening    Follow Up:   Return in about 6 months (around 9/29/2022) for Next scheduled follow up.     An After Visit Summary and PPPS were made available to the patient.

## 2022-03-31 ENCOUNTER — TELEPHONE (OUTPATIENT)
Dept: ORTHOPEDIC SURGERY | Facility: CLINIC | Age: 72
End: 2022-03-31

## 2022-03-31 NOTE — TELEPHONE ENCOUNTER
Provider: MARIO  Caller: VINNIE BLEVINS  Relationship to Patient: PATIENT  Pharmacy: N/A  Phone Number: [phone]  Reason for Call: PATIENT CALLING TO SCHEDULE BILAT KNEE CORTISON INJECTIONS  When was the patient last seen: 7.13.21 SAW JELENA

## 2022-04-11 ENCOUNTER — OFFICE VISIT (OUTPATIENT)
Dept: ORTHOPEDIC SURGERY | Facility: CLINIC | Age: 72
End: 2022-04-11

## 2022-04-11 VITALS — WEIGHT: 235 LBS | BODY MASS INDEX: 43.24 KG/M2 | TEMPERATURE: 97.6 F | HEIGHT: 62 IN

## 2022-04-11 DIAGNOSIS — M17.0 PRIMARY OSTEOARTHRITIS OF BOTH KNEES: Primary | ICD-10-CM

## 2022-04-11 PROCEDURE — 20610 DRAIN/INJ JOINT/BURSA W/O US: CPT | Performed by: PHYSICIAN ASSISTANT

## 2022-04-11 RX ORDER — METHYLPREDNISOLONE ACETATE 40 MG/ML
40 INJECTION, SUSPENSION INTRA-ARTICULAR; INTRALESIONAL; INTRAMUSCULAR; SOFT TISSUE
Status: COMPLETED | OUTPATIENT
Start: 2022-04-11 | End: 2022-04-11

## 2022-04-11 RX ORDER — LIDOCAINE HYDROCHLORIDE 20 MG/ML
2 INJECTION, SOLUTION INFILTRATION; PERINEURAL
Status: COMPLETED | OUTPATIENT
Start: 2022-04-11 | End: 2022-04-11

## 2022-04-11 RX ADMIN — METHYLPREDNISOLONE ACETATE 40 MG: 40 INJECTION, SUSPENSION INTRA-ARTICULAR; INTRALESIONAL; INTRAMUSCULAR; SOFT TISSUE at 14:39

## 2022-04-11 RX ADMIN — LIDOCAINE HYDROCHLORIDE 2 ML: 20 INJECTION, SOLUTION INFILTRATION; PERINEURAL at 14:39

## 2022-04-11 RX ADMIN — METHYLPREDNISOLONE ACETATE 40 MG: 40 INJECTION, SUSPENSION INTRA-ARTICULAR; INTRALESIONAL; INTRAMUSCULAR; SOFT TISSUE at 15:00

## 2022-04-11 RX ADMIN — LIDOCAINE HYDROCHLORIDE 2 ML: 20 INJECTION, SOLUTION INFILTRATION; PERINEURAL at 15:00

## 2022-04-11 NOTE — PROGRESS NOTES
"Indira Ragsdale is a 71 y.o. female here today for injection therapy.    Chief Complaint   Patient presents with   • Left Knee - Injections     Last injection 7/13/2021.   • Right Knee - Injections     Patient presents for repeat bilateral knee cortisone injection  Past Medical History:   Diagnosis Date   • Anxiety    • Cancer (HCC)     Breast Cancer   • Hyperlipidemia    • Hypertension    • Impaired functional mobility, balance, gait, and endurance    • Stroke (HCC)          Past Surgical History:   Procedure Laterality Date   • BREAST LUMPECTOMY Right 2000   • HYSTERECTOMY  1994       No Known Allergies    Objective   Temp 97.6 °F (36.4 °C)   Ht 157.5 cm (62.01\")   Wt 107 kg (235 lb)   BMI 42.97 kg/m²    Physical Exam    Skin exam stable with no erythema, ecchymosis or rash.  No new swelling.  No motor or sensory changes.  Distal pulse intact.    Large Joint Arthrocentesis: L knee  Date/Time: 4/11/2022 2:39 PM  Consent given by: patient  Site marked: site marked  Timeout: Immediately prior to procedure a time out was called to verify the correct patient, procedure, equipment, support staff and site/side marked as required   Supporting Documentation  Indications: pain   Procedure Details  Location: knee - L knee  Preparation: Patient was prepped and draped in the usual sterile fashion  Needle size: 22 G  Approach: anterolateral  Medications administered: 40 mg methylPREDNISolone acetate 40 MG/ML; 2 mL lidocaine 2% (1ml of lidocaine)  Patient tolerance: patient tolerated the procedure well with no immediate complications    Large Joint Arthrocentesis: R knee  Date/Time: 4/11/2022 3:00 PM  Consent given by: patient  Site marked: site marked  Timeout: Immediately prior to procedure a time out was called to verify the correct patient, procedure, equipment, support staff and site/side marked as required   Supporting Documentation  Indications: pain   Procedure Details  Location: knee - R knee  Preparation: " Patient was prepped and draped in the usual sterile fashion  Needle size: 22 G  Approach: anterolateral  Medications administered: 40 mg methylPREDNISolone acetate 40 MG/ML; 2 mL lidocaine 2% (1ml of lidocaine )  Patient tolerance: patient tolerated the procedure well with no immediate complications          Assessment/Plan      Diagnosis Plan   1. Primary osteoarthritis of both knees         Discussion of orthopaedic goals and activities and patient and/or guardian expressed appreciation.  Guided on proper techniques for mobility, strength, agility and/or conditioning exercises  Ice, heat, and/or modalities as beneficial  Watch for signs and symptoms of infection  Call or notify for any adverse effect from injection therapy    Recommendations:  Follow up in 3 months or prn  Patient agreeable to call or return sooner for any concerns.

## 2022-05-18 RX ORDER — ROSUVASTATIN CALCIUM 10 MG/1
TABLET, COATED ORAL
Qty: 90 TABLET | Refills: 0 | Status: SHIPPED | OUTPATIENT
Start: 2022-05-18 | End: 2022-09-29 | Stop reason: SDUPTHER

## 2022-05-18 RX ORDER — CLONIDINE HYDROCHLORIDE 0.1 MG/1
0.1 TABLET ORAL 3 TIMES DAILY
Qty: 270 TABLET | Refills: 0 | Status: SHIPPED | OUTPATIENT
Start: 2022-05-18 | End: 2022-09-29 | Stop reason: SDUPTHER

## 2022-05-18 RX ORDER — CARVEDILOL 25 MG/1
25 TABLET ORAL 2 TIMES DAILY WITH MEALS
Qty: 180 TABLET | Refills: 0 | Status: SHIPPED | OUTPATIENT
Start: 2022-05-18 | End: 2022-09-29 | Stop reason: SDUPTHER

## 2022-05-18 RX ORDER — LOSARTAN POTASSIUM 50 MG/1
50 TABLET ORAL DAILY
Qty: 90 TABLET | Refills: 0 | Status: SHIPPED | OUTPATIENT
Start: 2022-05-18 | End: 2022-09-29 | Stop reason: SDUPTHER

## 2022-05-18 RX ORDER — HYDROXYZINE HYDROCHLORIDE 25 MG/1
25 TABLET, FILM COATED ORAL EVERY 8 HOURS PRN
Qty: 180 TABLET | Refills: 0 | Status: SHIPPED | OUTPATIENT
Start: 2022-05-18 | End: 2022-09-29 | Stop reason: SDUPTHER

## 2022-05-18 RX ORDER — AMLODIPINE BESYLATE 10 MG/1
10 TABLET ORAL
Qty: 90 TABLET | Refills: 0 | Status: SHIPPED | OUTPATIENT
Start: 2022-05-18 | End: 2022-09-29 | Stop reason: SDUPTHER

## 2022-05-18 RX ORDER — DOXAZOSIN MESYLATE 4 MG/1
4 TABLET ORAL DAILY
Qty: 90 TABLET | Refills: 0 | Status: SHIPPED | OUTPATIENT
Start: 2022-05-18 | End: 2022-09-29 | Stop reason: SDUPTHER

## 2022-06-09 LAB
ALBUMIN SERPL-MCNC: 4.4 G/DL (ref 3.5–5.2)
ALBUMIN/GLOB SERPL: 1.4 G/DL
ALP SERPL-CCNC: 81 U/L (ref 39–117)
ALT SERPL-CCNC: 13 U/L (ref 1–33)
AST SERPL-CCNC: 14 U/L (ref 1–32)
BASOPHILS # BLD AUTO: 0.04 10*3/MM3 (ref 0–0.2)
BASOPHILS NFR BLD AUTO: 0.4 % (ref 0–1.5)
BILIRUB SERPL-MCNC: 0.4 MG/DL (ref 0–1.2)
BUN SERPL-MCNC: 17 MG/DL (ref 8–23)
BUN/CREAT SERPL: 23.3 (ref 7–25)
CALCIUM SERPL-MCNC: 9.6 MG/DL (ref 8.6–10.5)
CHLORIDE SERPL-SCNC: 105 MMOL/L (ref 98–107)
CHOLEST SERPL-MCNC: 111 MG/DL (ref 0–200)
CHOLEST/HDLC SERPL: 2.92 {RATIO}
CO2 SERPL-SCNC: 24.6 MMOL/L (ref 22–29)
CREAT SERPL-MCNC: 0.73 MG/DL (ref 0.57–1)
EGFRCR SERPLBLD CKD-EPI 2021: 88.6 ML/MIN/1.73
EOSINOPHIL # BLD AUTO: 0.04 10*3/MM3 (ref 0–0.4)
EOSINOPHIL NFR BLD AUTO: 0.4 % (ref 0.3–6.2)
ERYTHROCYTE [DISTWIDTH] IN BLOOD BY AUTOMATED COUNT: 13.2 % (ref 12.3–15.4)
GLOBULIN SER CALC-MCNC: 3.2 GM/DL
GLUCOSE SERPL-MCNC: 151 MG/DL (ref 65–99)
HBA1C MFR BLD: 6.2 % (ref 4.8–5.6)
HCT VFR BLD AUTO: 41.7 % (ref 34–46.6)
HDLC SERPL-MCNC: 38 MG/DL (ref 40–60)
HGB BLD-MCNC: 14.6 G/DL (ref 12–15.9)
IMM GRANULOCYTES # BLD AUTO: 0.03 10*3/MM3 (ref 0–0.05)
IMM GRANULOCYTES NFR BLD AUTO: 0.3 % (ref 0–0.5)
LDLC SERPL CALC-MCNC: 53 MG/DL (ref 0–100)
LYMPHOCYTES # BLD AUTO: 2.08 10*3/MM3 (ref 0.7–3.1)
LYMPHOCYTES NFR BLD AUTO: 19.5 % (ref 19.6–45.3)
MCH RBC QN AUTO: 31.8 PG (ref 26.6–33)
MCHC RBC AUTO-ENTMCNC: 35 G/DL (ref 31.5–35.7)
MCV RBC AUTO: 90.8 FL (ref 79–97)
MONOCYTES # BLD AUTO: 0.52 10*3/MM3 (ref 0.1–0.9)
MONOCYTES NFR BLD AUTO: 4.9 % (ref 5–12)
NEUTROPHILS # BLD AUTO: 7.94 10*3/MM3 (ref 1.7–7)
NEUTROPHILS NFR BLD AUTO: 74.5 % (ref 42.7–76)
NRBC BLD AUTO-RTO: 0 /100 WBC (ref 0–0.2)
PLATELET # BLD AUTO: 284 10*3/MM3 (ref 140–450)
POTASSIUM SERPL-SCNC: 4.2 MMOL/L (ref 3.5–5.2)
PROT SERPL-MCNC: 7.6 G/DL (ref 6–8.5)
RBC # BLD AUTO: 4.59 10*6/MM3 (ref 3.77–5.28)
SODIUM SERPL-SCNC: 141 MMOL/L (ref 136–145)
TRIGL SERPL-MCNC: 108 MG/DL (ref 0–150)
VLDLC SERPL CALC-MCNC: 20 MG/DL (ref 5–40)
WBC # BLD AUTO: 10.65 10*3/MM3 (ref 3.4–10.8)

## 2022-09-29 ENCOUNTER — OFFICE VISIT (OUTPATIENT)
Dept: INTERNAL MEDICINE | Facility: CLINIC | Age: 72
End: 2022-09-29

## 2022-09-29 VITALS
HEART RATE: 74 BPM | SYSTOLIC BLOOD PRESSURE: 138 MMHG | TEMPERATURE: 97.5 F | HEIGHT: 62 IN | OXYGEN SATURATION: 94 % | BODY MASS INDEX: 44.53 KG/M2 | DIASTOLIC BLOOD PRESSURE: 80 MMHG | WEIGHT: 242 LBS

## 2022-09-29 DIAGNOSIS — R73.03 BORDERLINE DIABETES MELLITUS: ICD-10-CM

## 2022-09-29 DIAGNOSIS — I10 ESSENTIAL HYPERTENSION: Primary | ICD-10-CM

## 2022-09-29 DIAGNOSIS — F41.8 ANXIETY ASSOCIATED WITH DEPRESSION: ICD-10-CM

## 2022-09-29 PROCEDURE — 99214 OFFICE O/P EST MOD 30 MIN: CPT | Performed by: INTERNAL MEDICINE

## 2022-09-29 RX ORDER — DOXAZOSIN MESYLATE 4 MG/1
4 TABLET ORAL DAILY
Qty: 90 TABLET | Refills: 1 | Status: SHIPPED | OUTPATIENT
Start: 2022-09-29 | End: 2023-03-30 | Stop reason: SDUPTHER

## 2022-09-29 RX ORDER — AMLODIPINE BESYLATE 10 MG/1
10 TABLET ORAL
Qty: 90 TABLET | Refills: 1 | Status: SHIPPED | OUTPATIENT
Start: 2022-09-29 | End: 2023-03-30 | Stop reason: SDUPTHER

## 2022-09-29 RX ORDER — CARVEDILOL 25 MG/1
25 TABLET ORAL 2 TIMES DAILY WITH MEALS
Qty: 180 TABLET | Refills: 1 | Status: SHIPPED | OUTPATIENT
Start: 2022-09-29 | End: 2023-03-30 | Stop reason: SDUPTHER

## 2022-09-29 RX ORDER — CLONIDINE HYDROCHLORIDE 0.1 MG/1
0.1 TABLET ORAL 3 TIMES DAILY
Qty: 270 TABLET | Refills: 1 | Status: SHIPPED | OUTPATIENT
Start: 2022-09-29 | End: 2023-03-30 | Stop reason: SDUPTHER

## 2022-09-29 RX ORDER — HYDROXYZINE HYDROCHLORIDE 25 MG/1
25 TABLET, FILM COATED ORAL EVERY 8 HOURS PRN
Qty: 180 TABLET | Refills: 1 | Status: SHIPPED | OUTPATIENT
Start: 2022-09-29 | End: 2023-03-30 | Stop reason: SDUPTHER

## 2022-09-29 RX ORDER — LOSARTAN POTASSIUM 50 MG/1
50 TABLET ORAL DAILY
Qty: 90 TABLET | Refills: 1 | Status: SHIPPED | OUTPATIENT
Start: 2022-09-29 | End: 2023-03-30 | Stop reason: SDUPTHER

## 2022-09-29 RX ORDER — ROSUVASTATIN CALCIUM 10 MG/1
TABLET, COATED ORAL
Qty: 90 TABLET | Refills: 1 | Status: SHIPPED | OUTPATIENT
Start: 2022-09-29 | End: 2023-03-30 | Stop reason: SDUPTHER

## 2022-09-29 NOTE — PROGRESS NOTES
Chief Complaint   Patient presents with   • Follow-up     For anxiety, HTN, and borderline DM.     Subjective   Crystal Ragsdale is a 72 y.o. female.     History of Present Illness  Here today for follow-up of hypertension, CVA, diabetes, anxiety and depression, history of breast cancer.  HTN/HLD/CVA-BP is fairly well controlled today.  Her home BP is running 120-135/70-85.  No recent CP, SOA, palpitations, headache. She is taking crestor 3 days a week.    Diabetes-A1c 6.2 approximately 3 months ago.  BS runs 130-150. She does not follow a specific diet for her diabetes or cholesterol, but she did cut back on her sweets, pop.  She does not exercise, states she is unable to due to underlying arthritis in her knees, but does try to ride her bicycle.  She was changed to amaryl 1 mg as she complained she had diarrhea with metformin.  She never started the amaryl.  Denies N/T/B of feet  Anxiety/depression-  She is not having much anxiety or depression on a day-to-day basis. She is lonely.  She is sleeping well.    History of breast cancer-mammogram August 2021 completed-she will call for her mammogram appointment.  DJD- She continues to have pain in her knees and does not ambulate much.  She is using a cane to ambulate.  She cannot walk far due to pain in knees.  HCM- she declines all vaccines but did get the COVID vaccine.  Mammogram is UTD.  Declines colonoscopy.  She quit smoking when she had her CVA a few yrs ago.         The following portions of the patient's history were reviewed and updated as appropriate: allergies, current medications, past family history, past medical history, past social history, past surgical history and problem list.    Review of Systems   Constitutional: Negative for activity change, appetite change and fatigue.   HENT: Negative for trouble swallowing.    Eyes: Negative for visual disturbance.   Respiratory: Negative for shortness of breath.    Cardiovascular: Negative for chest pain,  "palpitations and leg swelling.   Endocrine: Negative for polydipsia and polyuria.   Genitourinary: Negative for frequency.   Musculoskeletal: Positive for arthralgias and gait problem.   Skin: Negative for wound.   Neurological: Negative for numbness.   Psychiatric/Behavioral: Negative for dysphoric mood and sleep disturbance. The patient is nervous/anxious.        Objective   /80   Pulse 74   Temp 97.5 °F (36.4 °C)   Ht 157.5 cm (62.01\")   Wt 110 kg (242 lb)   SpO2 94%   BMI 44.25 kg/m²   Body mass index is 44.25 kg/m².  Physical Exam  Vitals and nursing note reviewed.   Constitutional:       General: She is not in acute distress.     Appearance: Normal appearance. She is well-developed. She is obese. She is not ill-appearing.      Comments: Kind and pleasant female, appears stated age and in no distress today   HENT:      Head: Normocephalic and atraumatic.      Right Ear: External ear normal.      Left Ear: External ear normal.   Eyes:      General:         Right eye: No discharge.         Left eye: No discharge.      Extraocular Movements: Extraocular movements intact.      Conjunctiva/sclera: Conjunctivae normal.   Neck:      Thyroid: No thyromegaly.      Vascular: No carotid bruit.      Comments: No thyromegaly or mass  Cardiovascular:      Rate and Rhythm: Normal rate and regular rhythm.      Pulses: Normal pulses.      Heart sounds: Normal heart sounds. No murmur heard.  Pulmonary:      Effort: Pulmonary effort is normal. No respiratory distress.      Breath sounds: Normal breath sounds. No wheezing.   Abdominal:      General: Bowel sounds are normal. There is no distension.      Palpations: Abdomen is soft.      Tenderness: There is no abdominal tenderness.   Musculoskeletal:         General: Normal range of motion.      Cervical back: Normal range of motion and neck supple.      Right lower leg: No edema.      Left lower leg: No edema.   Lymphadenopathy:      Cervical: No cervical adenopathy. "   Skin:     General: Skin is warm.      Findings: No rash.      Comments: Feet with no callus or ulcer, sensation grossly intact to light touch bilaterally   Neurological:      General: No focal deficit present.      Mental Status: She is alert and oriented to person, place, and time. Mental status is at baseline.      Cranial Nerves: No cranial nerve deficit.      Motor: Weakness present.      Coordination: Coordination normal.      Gait: Gait abnormal.      Comments: Ambulates with use of a rolling walker   Psychiatric:         Mood and Affect: Mood normal.         Behavior: Behavior normal.         Thought Content: Thought content normal.         Judgment: Judgment normal.         Assessment & Plan   Crystal Ragsdale is here today and the following problems have been addressed:      Diagnoses and all orders for this visit:    1. Essential hypertension (Primary)    2. Borderline diabetes mellitus  -     Comprehensive Metabolic Panel  -     Hemoglobin A1c    3. Anxiety associated with depression    Other orders  -     amLODIPine (NORVASC) 10 MG tablet; Take 1 tablet by mouth Daily.  Dispense: 90 tablet; Refill: 1  -     carvedilol (COREG) 25 MG tablet; Take 1 tablet by mouth 2 (Two) Times a Day With Meals.  Dispense: 180 tablet; Refill: 1  -     cloNIDine (CATAPRES) 0.1 MG tablet; Take 1 tablet by mouth 3 (Three) Times a Day.  Dispense: 270 tablet; Refill: 1  -     doxazosin (CARDURA) 4 MG tablet; Take 1 tablet by mouth Daily.  Dispense: 90 tablet; Refill: 1  -     hydrOXYzine (ATARAX) 25 MG tablet; Take 1 tablet by mouth Every 8 (Eight) Hours As Needed for Anxiety. for anxiety.  Dispense: 180 tablet; Refill: 1  -     losartan (COZAAR) 50 MG tablet; Take 1 tablet by mouth Daily.  Dispense: 90 tablet; Refill: 1  -     rosuvastatin (Crestor) 10 MG tablet; Take on MWF night only.  Dispense: 90 tablet; Refill: 1        Follow heart healthy/low salt/diabetic diet  Avoid processed foods  Monitor blood pressure and BS as  discussed  Exercise as tolerated -again recommend chair exercises  Take all medications as prescribed  See eye doctor annually or as discussed-patient states she will make her eye exam appointment soon  Wear protective foot wear/no bare feet  Check feet regularly for calluses or ulcers  Labs as noted  Anxiety appears to be improved, patient is now able to check her blood pressure at home without elevated numbers  She declines flu vaccine today  Declines colonoscopy    Return in about 6 months (around 3/29/2023) for Medicare Wellness.      Marilyn K. Vermeesch, MD      Please note that portions of this note were completed with a voice recognition program.  Efforts were made to edit dictation, but occasionally words are mistranscribed.

## 2022-09-30 LAB
ALBUMIN SERPL-MCNC: 4.6 G/DL (ref 3.5–5.2)
ALBUMIN/GLOB SERPL: 1.7 G/DL
ALP SERPL-CCNC: 90 U/L (ref 39–117)
ALT SERPL-CCNC: 11 U/L (ref 1–33)
AST SERPL-CCNC: 13 U/L (ref 1–32)
BILIRUB SERPL-MCNC: 0.4 MG/DL (ref 0–1.2)
BUN SERPL-MCNC: 10 MG/DL (ref 8–23)
BUN/CREAT SERPL: 15.9 (ref 7–25)
CALCIUM SERPL-MCNC: 9.8 MG/DL (ref 8.6–10.5)
CHLORIDE SERPL-SCNC: 104 MMOL/L (ref 98–107)
CO2 SERPL-SCNC: 28 MMOL/L (ref 22–29)
CREAT SERPL-MCNC: 0.63 MG/DL (ref 0.57–1)
EGFRCR SERPLBLD CKD-EPI 2021: 95 ML/MIN/1.73
GLOBULIN SER CALC-MCNC: 2.7 GM/DL
GLUCOSE SERPL-MCNC: 120 MG/DL (ref 65–99)
HBA1C MFR BLD: 6.5 % (ref 4.8–5.6)
POTASSIUM SERPL-SCNC: 4.3 MMOL/L (ref 3.5–5.2)
PROT SERPL-MCNC: 7.3 G/DL (ref 6–8.5)
SODIUM SERPL-SCNC: 144 MMOL/L (ref 136–145)

## 2022-10-03 ENCOUNTER — TELEPHONE (OUTPATIENT)
Dept: INTERNAL MEDICINE | Facility: CLINIC | Age: 72
End: 2022-10-03

## 2022-10-03 NOTE — TELEPHONE ENCOUNTER
HUB RELAYED MESSAGE. PATIENT STATED PREFERENCE FOR SILICONE.   PLEASE ADVISE IF THERE ARE ANY OTHER QUESTIONS FOR PATIENT.    DISPLAY PLAN FREE TEXT

## 2022-10-03 NOTE — TELEPHONE ENCOUNTER
Attempted contacting Pt no answer and no VM box set up.      HUB Please advise Pt: We received the order for her mastectomy bras but need to know if she wants silicone or foam/fiber?

## 2022-10-06 ENCOUNTER — TELEPHONE (OUTPATIENT)
Dept: ORTHOPEDIC SURGERY | Facility: CLINIC | Age: 72
End: 2022-10-06

## 2022-10-06 NOTE — TELEPHONE ENCOUNTER
Caller: VINNIE BLEVINS    Relationship to patient: SELF    Best call back number: [phone]    Chief complaint: BILATERAL KNEE PAIN    Type of visit: CORTISONE INJECTION    Requested date: ASAP    If rescheduling, when is the original appointment: N/A    Additional notes: LAST INJECTION April 2022

## 2022-10-17 ENCOUNTER — OFFICE VISIT (OUTPATIENT)
Dept: ORTHOPEDIC SURGERY | Facility: CLINIC | Age: 72
End: 2022-10-17

## 2022-10-17 VITALS — TEMPERATURE: 98.2 F | HEIGHT: 62 IN | WEIGHT: 242 LBS | BODY MASS INDEX: 44.53 KG/M2

## 2022-10-17 DIAGNOSIS — M17.0 PRIMARY OSTEOARTHRITIS OF BOTH KNEES: Primary | ICD-10-CM

## 2022-10-17 PROCEDURE — 20610 DRAIN/INJ JOINT/BURSA W/O US: CPT | Performed by: PHYSICIAN ASSISTANT

## 2022-10-17 RX ORDER — LIDOCAINE HYDROCHLORIDE 10 MG/ML
2 INJECTION, SOLUTION INFILTRATION; PERINEURAL
Status: COMPLETED | OUTPATIENT
Start: 2022-10-17 | End: 2022-10-17

## 2022-10-17 RX ORDER — METHYLPREDNISOLONE ACETATE 40 MG/ML
40 INJECTION, SUSPENSION INTRA-ARTICULAR; INTRALESIONAL; INTRAMUSCULAR; SOFT TISSUE
Status: COMPLETED | OUTPATIENT
Start: 2022-10-17 | End: 2022-10-17

## 2022-10-17 RX ADMIN — LIDOCAINE HYDROCHLORIDE 2 ML: 10 INJECTION, SOLUTION INFILTRATION; PERINEURAL at 14:18

## 2022-10-17 RX ADMIN — METHYLPREDNISOLONE ACETATE 40 MG: 40 INJECTION, SUSPENSION INTRA-ARTICULAR; INTRALESIONAL; INTRAMUSCULAR; SOFT TISSUE at 14:17

## 2022-10-17 RX ADMIN — METHYLPREDNISOLONE ACETATE 40 MG: 40 INJECTION, SUSPENSION INTRA-ARTICULAR; INTRALESIONAL; INTRAMUSCULAR; SOFT TISSUE at 14:18

## 2022-10-17 RX ADMIN — LIDOCAINE HYDROCHLORIDE 2 ML: 10 INJECTION, SOLUTION INFILTRATION; PERINEURAL at 14:17

## 2022-10-17 NOTE — PROGRESS NOTES
"Indira Ragsdale is a 72 y.o. female here today for injection therapy.    Chief Complaint   Patient presents with   • Left Knee - Injections     Last injection 7/13/2022.   • Right Knee - Injections     Patient presents for bilateral knee cortisone injections  Past Medical History:   Diagnosis Date   • Anxiety    • Cancer (HCC)     Breast Cancer   • Hyperlipidemia    • Hypertension    • Impaired functional mobility, balance, gait, and endurance    • Stroke (HCC)          Past Surgical History:   Procedure Laterality Date   • BREAST LUMPECTOMY Right 2000   • HYSTERECTOMY  1994       No Known Allergies    Objective   Temp 98.2 °F (36.8 °C)   Ht 157.5 cm (62.01\")   Wt 110 kg (242 lb)   BMI 44.25 kg/m²    Physical Exam    Skin exam stable with no erythema, ecchymosis or rash.  No new swelling.  No motor or sensory changes.  Distal pulse intact.    Large Joint Arthrocentesis: R knee  Date/Time: 10/17/2022 2:17 PM  Consent given by: patient  Site marked: site marked  Timeout: Immediately prior to procedure a time out was called to verify the correct patient, procedure, equipment, support staff and site/side marked as required   Supporting Documentation  Indications: pain   Procedure Details  Location: knee - R knee  Preparation: Patient was prepped and draped in the usual sterile fashion  Needle size: 22 G  Approach: anterolateral  Medications administered: 2 mL lidocaine 1 %; 40 mg methylPREDNISolone acetate 40 MG/ML  Patient tolerance: patient tolerated the procedure well with no immediate complications    Large Joint Arthrocentesis: L knee  Date/Time: 10/17/2022 2:18 PM  Consent given by: patient  Site marked: site marked  Timeout: Immediately prior to procedure a time out was called to verify the correct patient, procedure, equipment, support staff and site/side marked as required   Supporting Documentation  Indications: pain   Procedure Details  Location: knee - L knee  Preparation: Patient was prepped " and draped in the usual sterile fashion  Needle size: 22 G  Approach: anterolateral  Medications administered: 2 mL lidocaine 1 %; 40 mg methylPREDNISolone acetate 40 MG/ML  Patient tolerance: patient tolerated the procedure well with no immediate complications          Assessment & Plan      Diagnosis Plan   1. Primary osteoarthritis of both knees            Discussion of orthopaedic goals and activities and patient expressed appreciation.  Guided on proper techniques for mobility, strength, agility and/or conditioning exercises  Ice, heat, and/or modalities as beneficial  Watch for signs and symptoms of infection  Call or notify for any adverse effect from injection therapy    Recommendations:  Follow up in 3 months    Patient agreeable to call or return sooner for any concerns.

## 2023-01-20 NOTE — PROGRESS NOTES
"Date of Service: 2/19/18  Time In: 1100  Time Out: 1145      PROGRESS NOTE  Data:  Crystal Ragsdale is a 67 y.o. female who met with the undersigned for a regularly scheduled individual outpatient psychotherapy session at  [facility]. Patient started individual psychotherapy session by reporting  \"I'm lonely\". patient discussed she doesn't like to get out in the winter\". patient states \"my daughter came in over the winter and they only gave me one gift\" and \"I gave them all a gift and money\". Patient states \"should i get my feelings hurt over that?\". patient discussed her daughter degrade her and are condescending in nature. Patient discussed she doesn't understand why the kids think so badly of her and states \"it all started when they went to college\". patient discussed they go travel and don't tell her and states :they don't ask me to go or care enough to tell me\". patient discussed she gets jealous of her friends that are  and wants companionship. Patient asked \"i feel my sister should spend time with me and tell her  Im her sister and Im going to spend time with her\".     HPI: Depression, anxiety, anxiety when driving on the interstate and avoids it.     Clinical Maneuvering/Intervention:  Assisted patient in processing above session content; acknowledged and normalized patient’s thoughts, feelings, and concerns. Assisted patient in processing her feelings of continued anxiety. Assisted patient in processing her thoughts and feelings about her daughters resenting her and talking down to her but unsure of the reason.   Encouraged pt the importance of keeping all appointments and taking medications as prescribed and calling with any questions or concerns but patient explained having difficulty finding medication that doesn't cause negative side effects.  Assisted patient in understanding the importance of seeing the interdisciplinary team for continuity of care.  " Patient complains of side effects of a blood pressure pill. Patient to discuss with her PCP.  Patient to work on relaxation skills and techniques and role played. Patient denies si and hi. Assisted patient in processing her lack of confidence, low self esteem and people pleasing. Focused on negative thinking during session. Educated on negative thinking and putting expectations on people will always keep her mind thinking negative and to working positive thinking and breaking the pattern of expectations but being grateful for the time she gets with people. Educated on gratitude and CBT/negative thoughts. Patient to call and talk to senior citizens to find ways to get involved.     Allowed patient to freely discuss issues without interruption or judgment. Provided safe, confidential environment to facilitate the development of positive therapeutic relationship and encourage open, honest communication. Assisted patient in identifying risk factors which would indicate the need for higher level of care including thoughts to harm self or others and/or self-harming behavior and encouraged patient to contact this office, call 911, or present to the nearest emergency room should any of these events occur. Discussed crisis intervention services and means to access.  Patient adamantly and convincingly denies current suicidal or homicidal ideation or perceptual disturbance.    Assessment     Diagnoses and all orders for this visit:    Generalized anxiety disorder    Dysphoric mood             Mental Status Exam  Hygiene:  good  Dress:  casual  Attitude:  Guarded  Motor Activity:  Restless  Speech:  Rapid  Mood:  anxious  Affect:  depressed and anxious  Thought Processes:  Goal directed  Thought Content:  normal  Suicidal Thoughts:  denies  Homicidal Thoughts:  denies  Crisis Safety Plan: yes, to come to the emergency room.  Hallucinations:  denies    Patient's Support Network Includes:  extended family    Progress toward goal:  Not at goal    Functional Status: Moderate impairment     Prognosis: Guarded with Ongoing Treatment    Plan         Patient will adhere to medication regimen as prescribed and report any side effects. Patient will contact this office, call 911 or present to the nearest emergency room should suicidal or homicidal ideations occur. Provide Cognitive Behavioral Therapy and Integrative Therapy to improve functioning, maintain stability, and avoid decompensation and the need for higher level of care.          Return in about 3 weeks (around 3/12/2018), or if symptoms worsen or fail to improve.      This document signed by Jennifer Matamoros LCSW, February 19, 2018 4:12 PM               Cephalexin Counseling: I counseled the patient regarding use of cephalexin as an antibiotic for prophylactic and/or therapeutic purposes. Cephalexin (commonly prescribed under brand name Keflex) is a cephalosporin antibiotic which is active against numerous classes of bacteria, including most skin bacteria. Side effects may include nausea, diarrhea, gastrointestinal upset, rash, hives, yeast infections, and in rare cases, hepatitis, kidney disease, seizures, fever, confusion, neurologic symptoms, and others. Patients with severe allergies to penicillin medications are cautioned that there is about a 10% incidence of cross-reactivity with cephalosporins. When possible, patients with penicillin allergies should use alternatives to cephalosporins for antibiotic therapy.

## 2023-03-10 ENCOUNTER — TELEPHONE (OUTPATIENT)
Dept: INTERNAL MEDICINE | Facility: CLINIC | Age: 73
End: 2023-03-10

## 2023-03-10 NOTE — TELEPHONE ENCOUNTER
Yes she may take an enema also.  I recommend fleets enema and hold for 15 minutes.  I recommend that she take Metamucil supplement of her choice, capsule or tablet, gummy or powder form every day to help with her chronic constipation issues.

## 2023-03-10 NOTE — TELEPHONE ENCOUNTER
Caller: Crystal Ragsdale    Relationship to patient: Self    Best call back number: [phone]    PATIENT STATES THAT SHE IS CONSTIPATED AND HAS TAKEN A LAXATIVE, BUT WANTS TO KNOW IF SHE CAN TAKE AN ENEMA.  CAN SOMEONE CALL HER AS SOON AS POSSIBLE.

## 2023-03-30 ENCOUNTER — OFFICE VISIT (OUTPATIENT)
Dept: INTERNAL MEDICINE | Facility: CLINIC | Age: 73
End: 2023-03-30
Payer: MEDICARE

## 2023-03-30 VITALS
TEMPERATURE: 98.5 F | OXYGEN SATURATION: 94 % | SYSTOLIC BLOOD PRESSURE: 138 MMHG | BODY MASS INDEX: 44.72 KG/M2 | WEIGHT: 243 LBS | HEART RATE: 68 BPM | DIASTOLIC BLOOD PRESSURE: 82 MMHG | HEIGHT: 62 IN

## 2023-03-30 DIAGNOSIS — I10 ESSENTIAL HYPERTENSION: ICD-10-CM

## 2023-03-30 DIAGNOSIS — F41.8 ANXIETY ASSOCIATED WITH DEPRESSION: ICD-10-CM

## 2023-03-30 DIAGNOSIS — R73.03 BORDERLINE DIABETES MELLITUS: ICD-10-CM

## 2023-03-30 DIAGNOSIS — Z00.00 ENCOUNTER FOR MEDICARE ANNUAL WELLNESS EXAM: Primary | ICD-10-CM

## 2023-03-30 PROCEDURE — 96160 PT-FOCUSED HLTH RISK ASSMT: CPT | Performed by: INTERNAL MEDICINE

## 2023-03-30 PROCEDURE — 1160F RVW MEDS BY RX/DR IN RCRD: CPT | Performed by: INTERNAL MEDICINE

## 2023-03-30 PROCEDURE — 99397 PER PM REEVAL EST PAT 65+ YR: CPT | Performed by: INTERNAL MEDICINE

## 2023-03-30 PROCEDURE — 1170F FXNL STATUS ASSESSED: CPT | Performed by: INTERNAL MEDICINE

## 2023-03-30 PROCEDURE — 1159F MED LIST DOCD IN RCRD: CPT | Performed by: INTERNAL MEDICINE

## 2023-03-30 PROCEDURE — G0439 PPPS, SUBSEQ VISIT: HCPCS | Performed by: INTERNAL MEDICINE

## 2023-03-30 PROCEDURE — 3075F SYST BP GE 130 - 139MM HG: CPT | Performed by: INTERNAL MEDICINE

## 2023-03-30 PROCEDURE — 3079F DIAST BP 80-89 MM HG: CPT | Performed by: INTERNAL MEDICINE

## 2023-03-30 RX ORDER — AMLODIPINE BESYLATE 10 MG/1
10 TABLET ORAL
Qty: 90 TABLET | Refills: 1 | Status: SHIPPED | OUTPATIENT
Start: 2023-03-30

## 2023-03-30 RX ORDER — DOXAZOSIN MESYLATE 4 MG/1
4 TABLET ORAL DAILY
Qty: 90 TABLET | Refills: 1 | Status: SHIPPED | OUTPATIENT
Start: 2023-03-30

## 2023-03-30 RX ORDER — ROSUVASTATIN CALCIUM 10 MG/1
TABLET, COATED ORAL
Qty: 90 TABLET | Refills: 1 | Status: SHIPPED | OUTPATIENT
Start: 2023-03-30

## 2023-03-30 RX ORDER — CLONIDINE HYDROCHLORIDE 0.1 MG/1
0.1 TABLET ORAL 3 TIMES DAILY
Qty: 270 TABLET | Refills: 1 | Status: SHIPPED | OUTPATIENT
Start: 2023-03-30

## 2023-03-30 RX ORDER — CARVEDILOL 25 MG/1
25 TABLET ORAL 2 TIMES DAILY WITH MEALS
Qty: 180 TABLET | Refills: 1 | Status: SHIPPED | OUTPATIENT
Start: 2023-03-30

## 2023-03-30 RX ORDER — GLIMEPIRIDE 1 MG/1
1 TABLET ORAL
Qty: 90 TABLET | Refills: 1 | Status: SHIPPED | OUTPATIENT
Start: 2023-03-30

## 2023-03-30 RX ORDER — LOSARTAN POTASSIUM 50 MG/1
50 TABLET ORAL DAILY
Qty: 90 TABLET | Refills: 1 | Status: SHIPPED | OUTPATIENT
Start: 2023-03-30

## 2023-03-30 RX ORDER — HYDROXYZINE HYDROCHLORIDE 25 MG/1
25 TABLET, FILM COATED ORAL EVERY 8 HOURS PRN
Qty: 180 TABLET | Refills: 1 | Status: SHIPPED | OUTPATIENT
Start: 2023-03-30

## 2023-03-30 NOTE — PROGRESS NOTES
The ABCs of the Annual Wellness Visit  Subsequent Medicare Wellness Visit    Subjective      Crystal Ragsdale is a 72 y.o. female who presents for a Subsequent Medicare Wellness Visit and annual physical exam.  PMH of HTN, DM, history of breast cancer, anxiety and depression, DJD, history of CVA.  Has had initial COVID vaccine and no other vaccines.  Last mammogram 2021-she says she had mammogram last yr and it is not in chart. She has mammogram in May scheduled.  No documented colonoscopy or DEXA scan.  BP borderline elevated, but usually less than 130/80.  She denies any CP, SOA, palpitations. She has some edema.  A1c 6.5 approximately 6 months ago, BS runs less than 125.  Patient is compliant with all medications.  She is trying to avoid salt and sugar in diet.  Her legs and feet fell numb.  She has no back pain. She has weakness in her legs and is limited in her ability to do thing, can only go short distance to do anything at home.   She says she went to [facility] for strengthening but it did not help her. She has some burning in her feet. She needs to get into eye doctor and dentist.     The following portions of the patient's history were reviewed and   updated as appropriate: allergies, current medications, past family history, past medical history, past social history, past surgical history and problem list.    Compared to one year ago, the patient feels her physical   health is the same. Other than she feels weak    Compared to one year ago, the patient feels her mental   health is the same.    Recent Hospitalizations:  She was not admitted to the hospital during the last year.       Current Medical Providers:  Patient Care Team:  Vermeesch, Marilyn K, MD as PCP - General (Internal Medicine & Pediatrics)    Outpatient Medications Prior to Visit   Medication Sig Dispense Refill   • aspirin 81 MG chewable tablet Chew 1 tablet Daily.     • amLODIPine (NORVASC) 10 MG tablet Take 1 tablet by mouth Daily. 90 tablet 1  "  • carvedilol (COREG) 25 MG tablet Take 1 tablet by mouth 2 (Two) Times a Day With Meals. 180 tablet 1   • cloNIDine (CATAPRES) 0.1 MG tablet Take 1 tablet by mouth 3 (Three) Times a Day. 270 tablet 1   • doxazosin (CARDURA) 4 MG tablet Take 1 tablet by mouth Daily. 90 tablet 1   • glimepiride (Amaryl) 1 MG tablet Take 1 tablet by mouth Every Morning Before Breakfast. 90 tablet 3   • hydrOXYzine (ATARAX) 25 MG tablet Take 1 tablet by mouth Every 8 (Eight) Hours As Needed for Anxiety. for anxiety. 180 tablet 1   • losartan (COZAAR) 50 MG tablet Take 1 tablet by mouth Daily. 90 tablet 1   • rosuvastatin (Crestor) 10 MG tablet Take on MWF night only. 90 tablet 1     No facility-administered medications prior to visit.       No opioid medication identified on active medication list. I have reviewed chart for other potential  high risk medication/s and harmful drug interactions in the elderly.          Aspirin is on active medication list. Aspirin use is indicated based on review of current medical condition/s. Pros and cons of this therapy have been discussed today. Benefits of this medication outweigh potential harm.  Patient has been encouraged to continue taking this medication.  .      Patient Active Problem List   Diagnosis   • Essential hypertension   • Anxiety associated with depression   • Encounter for Medicare annual wellness exam   • Tobacco abuse counseling   • Chronic pain of left ankle   • Colon cancer screening   • History of breast cancer   • Borderline diabetes mellitus   • Status post CVA   • Arthritis of knee     Advance Care Planning  Advance Directive is on file.  ACP discussion was held with the patient during this visit. Patient has an advance directive in EMR which is still valid.      Objective    Vitals:    03/30/23 1437   BP: 138/82   Pulse: 68   Temp: 98.5 °F (36.9 °C)   SpO2: 94%   Weight: 110 kg (243 lb)   Height: 157.5 cm (62.01\")   PainSc: 0-No pain     Estimated body mass index is 44.43 " "kg/m² as calculated from the following:    Height as of this encounter: 157.5 cm (62.01\").    Weight as of this encounter: 110 kg (243 lb).    Class 3 Severe Obesity (BMI >=40). Obesity-related health conditions include the following: hypertension, diabetes mellitus and osteoarthritis. Obesity is unchanged. BMI is is above average; BMI management plan is completed. We discussed portion control and increasing exercise.      Does the patient have evidence of cognitive impairment?   No            HEALTH RISK ASSESSMENT    Smoking Status:  Social History     Tobacco Use   Smoking Status Every Day   • Packs/day: 1.00   • Types: Cigarettes   Smokeless Tobacco Never     Alcohol Consumption:  Social History     Substance and Sexual Activity   Alcohol Use No     Fall Risk Screen:    STEADI Fall Risk Assessment was completed, and patient is at MODERATE risk for falls. Assessment completed on:3/30/2023    Depression Screening:  PHQ-2/PHQ-9 Depression Screening 3/30/2023   Little Interest or Pleasure in Doing Things 0-->not at all   Feeling Down, Depressed or Hopeless 1-->several days   PHQ-9: Brief Depression Severity Measure Score 1       Health Habits and Functional and Cognitive Screening:  Functional & Cognitive Status 3/30/2023   Do you have difficulty preparing food and eating? Yes   Do you have difficulty bathing yourself, getting dressed or grooming yourself? Yes   Do you have difficulty using the toilet? No   Do you have difficulty moving around from place to place? Yes   Do you have trouble with steps or getting out of a bed or a chair? Yes   Current Diet Limited Junk Food   Dental Exam Not up to date   Eye Exam Not up to date   Exercise (times per week) 0 times per week   Current Exercises Include No Regular Exercise   Current Exercise Activities Include -   Do you need help using the phone?  No   Are you deaf or do you have serious difficulty hearing?  No   Do you need help with transportation? No   Do you need " help shopping? Yes   Do you need help preparing meals?  Yes   Do you need help with housework?  Yes   Do you need help with laundry? No   Do you need help taking your medications? No   Do you need help managing money? No   Do you ever drive or ride in a car without wearing a seat belt? No   Have you felt unusual stress, anger or loneliness in the last month? Yes   Who do you live with? Alone   If you need help, do you have trouble finding someone available to you? No   Have you been bothered in the last four weeks by sexual problems? No   Do you have difficulty concentrating, remembering or making decisions? No       Age-appropriate Screening Schedule:  Refer to the list below for future screening recommendations based on patient's age, sex and/or medical conditions. Orders for these recommended tests are listed in the plan section. The patient has been provided with a written plan.    Health Maintenance   Topic Date Due   • DXA SCAN  Never done   • Pneumococcal Vaccine 65+ (1 - PCV) Never done   • ZOSTER VACCINE (1 of 2) Never done   • COVID-19 Vaccine (3 - Booster for Pfizer series) 11/09/2021   • INFLUENZA VACCINE  03/31/2023 (Originally 8/1/2022)   • COLORECTAL CANCER SCREENING  05/01/2023 (Originally 1950)   • LIPID PANEL  06/08/2023   • MAMMOGRAM  08/05/2023   • ANNUAL WELLNESS VISIT  03/30/2024   • HEPATITIS C SCREENING  Completed   • PAP SMEAR  Discontinued   • TDAP/TD VACCINES  Discontinued            Physical Exam  Vitals and nursing note reviewed.   Constitutional:       General: She is not in acute distress.     Appearance: Normal appearance. She is well-developed. She is obese. She is not ill-appearing.      Comments: Kind and pleasant female, appears stated age and in no distress today   HENT:      Head: Normocephalic and atraumatic.      Right Ear: Tympanic membrane, ear canal and external ear normal.      Left Ear: Tympanic membrane, ear canal and external ear normal.      Nose: No congestion.       Mouth/Throat:      Pharynx: No posterior oropharyngeal erythema.   Eyes:      General:         Right eye: No discharge.         Left eye: No discharge.      Extraocular Movements: Extraocular movements intact.      Conjunctiva/sclera: Conjunctivae normal.      Pupils: Pupils are equal, round, and reactive to light.   Neck:      Thyroid: No thyromegaly.      Vascular: No carotid bruit.      Comments: No thyromegaly or mass  Cardiovascular:      Rate and Rhythm: Normal rate and regular rhythm.      Pulses: Normal pulses.      Heart sounds: Normal heart sounds. No murmur heard.  Pulmonary:      Effort: Pulmonary effort is normal. No respiratory distress.      Breath sounds: Normal breath sounds. No wheezing.   Abdominal:      General: Bowel sounds are normal. There is no distension.      Palpations: Abdomen is soft.      Tenderness: There is no abdominal tenderness.   Musculoskeletal:         General: Normal range of motion.      Cervical back: Normal range of motion and neck supple.      Right lower leg: Edema present.      Left lower leg: Edema present.      Comments: +1 edema to ankles   Lymphadenopathy:      Cervical: No cervical adenopathy.   Skin:     General: Skin is warm.      Findings: No rash.      Comments: Feet with no callus or ulcer, sensation grossly intact to light touch bilaterally   Neurological:      General: No focal deficit present.      Mental Status: She is alert and oriented to person, place, and time. Mental status is at baseline.      Cranial Nerves: No cranial nerve deficit.      Motor: Weakness present.      Coordination: Coordination normal.      Gait: Gait abnormal.      Comments: Patient ambulates with use of a cane   Psychiatric:         Mood and Affect: Mood normal.         Behavior: Behavior normal.         Thought Content: Thought content normal.         Judgment: Judgment normal.         CMS Preventative Services Quick Reference  Risk Factors Identified During  Encounter:    Inadequate Social Support, Isolation, Loneliness, Lack of Transportation, Financial Difficulties, or Caregiver Stress: recommend she go to senior center  Inactivity/Sedentary: Patient was advised to exercise at least 150 minutes a week per CDC recommendations.  Urinary Incontinence: she wears a pad  Dental Screening Recommended  Vision Screening Recommended    The above risks/problems have been discussed with the patient.  Pertinent information has been shared with the patient in the After Visit Summary.    Diagnoses and all orders for this visit:    1. Encounter for Medicare annual wellness exam (Primary)  -     CBC & Differential  -     Comprehensive Metabolic Panel  -     Hemoglobin A1c    2. Essential hypertension  -     CBC & Differential  -     Comprehensive Metabolic Panel    3. Borderline diabetes mellitus  -     Comprehensive Metabolic Panel  -     Hemoglobin A1c    4. Anxiety associated with depression    Other orders  -     amLODIPine (NORVASC) 10 MG tablet; Take 1 tablet by mouth Daily.  Dispense: 90 tablet; Refill: 1  -     carvedilol (COREG) 25 MG tablet; Take 1 tablet by mouth 2 (Two) Times a Day With Meals.  Dispense: 180 tablet; Refill: 1  -     cloNIDine (CATAPRES) 0.1 MG tablet; Take 1 tablet by mouth 3 (Three) Times a Day.  Dispense: 270 tablet; Refill: 1  -     doxazosin (CARDURA) 4 MG tablet; Take 1 tablet by mouth Daily.  Dispense: 90 tablet; Refill: 1  -     glimepiride (Amaryl) 1 MG tablet; Take 1 tablet by mouth Every Morning Before Breakfast.  Dispense: 90 tablet; Refill: 1  -     hydrOXYzine (ATARAX) 25 MG tablet; Take 1 tablet by mouth Every 8 (Eight) Hours As Needed for Anxiety. for anxiety.  Dispense: 180 tablet; Refill: 1  -     losartan (COZAAR) 50 MG tablet; Take 1 tablet by mouth Daily.  Dispense: 90 tablet; Refill: 1  -     rosuvastatin (Crestor) 10 MG tablet; Take on MWF night only.  Dispense: 90 tablet; Refill: 1    Follow heart healthy/low  salt/low-cholesterol/diabetic diet  Avoid processed foods  Monitor blood pressure as discussed  Exercise as tolerated up to 30 minutes 5 days per week  Take all medications as prescribed  Monitor feet for callus and ulcer  Continue Crestor for hyperlipidemia  Continue aspirin 81 mg daily  Labs as noted  Recommend Aspercreme with lidocaine one hour before sleep due to complaints of burning and tingling in feet at night  Elevate legs throughout the day due to mild edema, also recommend compression stockings, however patient states she has difficulty applying these  Offered physical therapy for strengthening, however patient states this was not helpful in the past    Follow Up:     Return to clinic in 6 months for routine follow-up visit    An After Visit Summary and PPPS were made available to the patient.

## 2023-03-31 LAB
ALBUMIN SERPL-MCNC: 4.5 G/DL (ref 3.7–4.7)
ALBUMIN/GLOB SERPL: 1.4 {RATIO} (ref 1.2–2.2)
ALP SERPL-CCNC: 84 IU/L (ref 44–121)
ALT SERPL-CCNC: 10 IU/L (ref 0–32)
AST SERPL-CCNC: 18 IU/L (ref 0–40)
BASOPHILS # BLD AUTO: 0 X10E3/UL (ref 0–0.2)
BASOPHILS NFR BLD AUTO: 1 %
BILIRUB SERPL-MCNC: 0.3 MG/DL (ref 0–1.2)
BUN SERPL-MCNC: 12 MG/DL (ref 8–27)
BUN/CREAT SERPL: 19 (ref 12–28)
CALCIUM SERPL-MCNC: 9.6 MG/DL (ref 8.7–10.3)
CHLORIDE SERPL-SCNC: 104 MMOL/L (ref 96–106)
CO2 SERPL-SCNC: 23 MMOL/L (ref 20–29)
CREAT SERPL-MCNC: 0.63 MG/DL (ref 0.57–1)
EGFRCR SERPLBLD CKD-EPI 2021: 95 ML/MIN/1.73
EOSINOPHIL # BLD AUTO: 0.1 X10E3/UL (ref 0–0.4)
EOSINOPHIL NFR BLD AUTO: 1 %
ERYTHROCYTE [DISTWIDTH] IN BLOOD BY AUTOMATED COUNT: 12.2 % (ref 11.7–15.4)
GLOBULIN SER CALC-MCNC: 3.2 G/DL (ref 1.5–4.5)
GLUCOSE SERPL-MCNC: 104 MG/DL (ref 70–99)
HBA1C MFR BLD: 6.2 % (ref 4.8–5.6)
HCT VFR BLD AUTO: 40.5 % (ref 34–46.6)
HGB BLD-MCNC: 14.3 G/DL (ref 11.1–15.9)
IMM GRANULOCYTES # BLD AUTO: 0 X10E3/UL (ref 0–0.1)
IMM GRANULOCYTES NFR BLD AUTO: 0 %
LYMPHOCYTES # BLD AUTO: 2.3 X10E3/UL (ref 0.7–3.1)
LYMPHOCYTES NFR BLD AUTO: 31 %
MCH RBC QN AUTO: 30.9 PG (ref 26.6–33)
MCHC RBC AUTO-ENTMCNC: 35.3 G/DL (ref 31.5–35.7)
MCV RBC AUTO: 88 FL (ref 79–97)
MONOCYTES # BLD AUTO: 0.5 X10E3/UL (ref 0.1–0.9)
MONOCYTES NFR BLD AUTO: 6 %
NEUTROPHILS # BLD AUTO: 4.6 X10E3/UL (ref 1.4–7)
NEUTROPHILS NFR BLD AUTO: 61 %
PLATELET # BLD AUTO: 381 X10E3/UL (ref 150–450)
POTASSIUM SERPL-SCNC: 3.9 MMOL/L (ref 3.5–5.2)
PROT SERPL-MCNC: 7.7 G/DL (ref 6–8.5)
RBC # BLD AUTO: 4.63 X10E6/UL (ref 3.77–5.28)
SODIUM SERPL-SCNC: 143 MMOL/L (ref 134–144)
WBC # BLD AUTO: 7.5 X10E3/UL (ref 3.4–10.8)

## 2023-04-27 RX ORDER — DOXAZOSIN MESYLATE 4 MG/1
4 TABLET ORAL DAILY
Qty: 90 TABLET | Refills: 1 | Status: SHIPPED | OUTPATIENT
Start: 2023-04-27

## 2023-04-27 NOTE — TELEPHONE ENCOUNTER
Caller: JnJelenaCrystal F    Relationship: Self    Best call back number:  [phone]    Requested Prescriptions:   Requested Prescriptions     Pending Prescriptions Disp Refills   • doxazosin (CARDURA) 4 MG tablet 90 tablet 1     Sig: Take 1 tablet by mouth Daily.        Pharmacy where request should be sent: [facility] [address] [phone] [facility] [phone] FX     Last office visit with prescribing clinician: Visit date not found   Last telemedicine visit with prescribing clinician: Visit date not found   Next office visit with prescribing clinician: Visit date not found     Additional details provided by patient:     Does the patient have less than a 3 day supply:  [] Yes  [x] No    Would you like a call back once the refill request has been completed: [x] Yes [] No    If the office needs to give you a call back, can they leave a voicemail: [x] Yes [] No    Jose Garibay Rep   04/27/23 09:26 EDT

## 2023-05-10 ENCOUNTER — TELEPHONE (OUTPATIENT)
Dept: INTERNAL MEDICINE | Facility: CLINIC | Age: 73
End: 2023-05-10
Payer: MEDICARE

## 2023-05-10 NOTE — TELEPHONE ENCOUNTER
Caller: VINNIE BLEVINS     Relationship:     Best call back number: [phone]    What test was performed: MAMMOGRAM    When was the test performed: WEEK AGO    Where was the test performed: [facility]    Additional notes:     PLEASE CALL WITH RESULTS

## 2023-08-21 ENCOUNTER — PREP FOR SURGERY (OUTPATIENT)
Dept: OTHER | Facility: HOSPITAL | Age: 73
End: 2023-08-21
Payer: MEDICARE

## 2023-08-21 DIAGNOSIS — H25.11 NUCLEAR SCLEROTIC CATARACT OF RIGHT EYE: Primary | ICD-10-CM

## 2023-08-21 RX ORDER — SODIUM CHLORIDE 0.9 % (FLUSH) 0.9 %
1-10 SYRINGE (ML) INJECTION AS NEEDED
OUTPATIENT
Start: 2023-08-21

## 2023-08-21 RX ORDER — CYCLOPENT/TROPIC/PHEN/KETR/WAT 1%-1%-2.5%
1 DROPS (EA) OPHTHALMIC (EYE)
OUTPATIENT
Start: 2023-08-21 | End: 2023-08-21

## 2023-08-21 RX ORDER — TETRACAINE HYDROCHLORIDE 5 MG/ML
1 SOLUTION OPHTHALMIC SEE ADMIN INSTRUCTIONS
OUTPATIENT
Start: 2023-08-21

## 2023-08-21 RX ORDER — PREDNISOLONE ACETATE 10 MG/ML
1 SUSPENSION/ DROPS OPHTHALMIC SEE ADMIN INSTRUCTIONS
OUTPATIENT
Start: 2023-08-21

## 2023-08-21 RX ORDER — SODIUM CHLORIDE 0.9 % (FLUSH) 0.9 %
10 SYRINGE (ML) INJECTION EVERY 12 HOURS SCHEDULED
OUTPATIENT
Start: 2023-08-21

## 2023-08-21 RX ORDER — SODIUM CHLORIDE 9 MG/ML
40 INJECTION, SOLUTION INTRAVENOUS AS NEEDED
OUTPATIENT
Start: 2023-08-21

## 2023-08-22 PROBLEM — H25.11 NUCLEAR SCLEROTIC CATARACT OF RIGHT EYE: Status: ACTIVE | Noted: 2023-08-22

## 2023-08-29 NOTE — NURSING NOTE
Patient instructed on PAT PASS information.  Patient/family member verbalized understanding of instruction/education.    Patient confirmed arrival time of 1325 during PAT call.

## 2023-08-31 ENCOUNTER — HOSPITAL ENCOUNTER (OUTPATIENT)
Facility: HOSPITAL | Age: 73
Setting detail: HOSPITAL OUTPATIENT SURGERY
Discharge: HOME | End: 2023-08-31
Attending: OPHTHALMOLOGY | Admitting: OPHTHALMOLOGY
Payer: MEDICARE

## 2023-08-31 ENCOUNTER — ANESTHESIA EVENT (OUTPATIENT)
Dept: PERIOP | Facility: HOSPITAL | Age: 73
End: 2023-08-31
Payer: MEDICARE

## 2023-08-31 ENCOUNTER — ANESTHESIA (OUTPATIENT)
Dept: PERIOP | Facility: HOSPITAL | Age: 73
End: 2023-08-31
Payer: MEDICARE

## 2023-08-31 VITALS
BODY MASS INDEX: 38.64 KG/M2 | SYSTOLIC BLOOD PRESSURE: 140 MMHG | HEIGHT: 62 IN | DIASTOLIC BLOOD PRESSURE: 76 MMHG | WEIGHT: 210 LBS | HEART RATE: 67 BPM | OXYGEN SATURATION: 92 % | RESPIRATION RATE: 18 BRPM | TEMPERATURE: 97.5 F

## 2023-08-31 DIAGNOSIS — H25.11 NUCLEAR SCLEROTIC CATARACT OF RIGHT EYE: ICD-10-CM

## 2023-08-31 PROCEDURE — 25010000002 MIDAZOLAM PER 1MG: Performed by: NURSE ANESTHETIST, CERTIFIED REGISTERED

## 2023-08-31 PROCEDURE — V2632 POST CHMBR INTRAOCULAR LENS: HCPCS | Performed by: OPHTHALMOLOGY

## 2023-08-31 PROCEDURE — 25010000002 PROPOFOL 10 MG/ML EMULSION: Performed by: NURSE ANESTHETIST, CERTIFIED REGISTERED

## 2023-08-31 DEVICE — LENS MONOFOCAL IQ CC60WF235: Type: IMPLANTABLE DEVICE | Site: POSTERIOR CHAMBER | Status: FUNCTIONAL

## 2023-08-31 RX ORDER — TETRACAINE HYDROCHLORIDE 5 MG/ML
SOLUTION OPHTHALMIC AS NEEDED
Status: DISCONTINUED | OUTPATIENT
Start: 2023-08-31 | End: 2023-08-31 | Stop reason: HOSPADM

## 2023-08-31 RX ORDER — LIDOCAINE HYDROCHLORIDE 40 MG/ML
INJECTION, SOLUTION RETROBULBAR; TOPICAL AS NEEDED
Status: DISCONTINUED | OUTPATIENT
Start: 2023-08-31 | End: 2023-08-31 | Stop reason: HOSPADM

## 2023-08-31 RX ORDER — PROPOFOL 10 MG/ML
VIAL (ML) INTRAVENOUS AS NEEDED
Status: DISCONTINUED | OUTPATIENT
Start: 2023-08-31 | End: 2023-08-31 | Stop reason: SURG

## 2023-08-31 RX ORDER — PREDNISOLONE ACETATE 10 MG/ML
1 SUSPENSION/ DROPS OPHTHALMIC 4 TIMES DAILY
Start: 2023-08-31

## 2023-08-31 RX ORDER — SODIUM CHLORIDE, SODIUM LACTATE, POTASSIUM CHLORIDE, CALCIUM CHLORIDE 600; 310; 30; 20 MG/100ML; MG/100ML; MG/100ML; MG/100ML
1000 INJECTION, SOLUTION INTRAVENOUS CONTINUOUS
Status: DISCONTINUED | OUTPATIENT
Start: 2023-08-31 | End: 2023-08-31 | Stop reason: HOSPADM

## 2023-08-31 RX ORDER — KETAMINE HYDROCHLORIDE 50 MG/ML
INJECTION, SOLUTION, CONCENTRATE INTRAMUSCULAR; INTRAVENOUS AS NEEDED
Status: DISCONTINUED | OUTPATIENT
Start: 2023-08-31 | End: 2023-08-31 | Stop reason: SURG

## 2023-08-31 RX ORDER — TETRACAINE HYDROCHLORIDE 5 MG/ML
1 SOLUTION OPHTHALMIC SEE ADMIN INSTRUCTIONS
Status: COMPLETED | OUTPATIENT
Start: 2023-08-31 | End: 2023-08-31

## 2023-08-31 RX ORDER — MIDAZOLAM HYDROCHLORIDE 2 MG/2ML
INJECTION, SOLUTION INTRAMUSCULAR; INTRAVENOUS AS NEEDED
Status: DISCONTINUED | OUTPATIENT
Start: 2023-08-31 | End: 2023-08-31 | Stop reason: SURG

## 2023-08-31 RX ORDER — PREDNISOLONE ACETATE 10 MG/ML
SUSPENSION/ DROPS OPHTHALMIC AS NEEDED
Status: DISCONTINUED | OUTPATIENT
Start: 2023-08-31 | End: 2023-08-31 | Stop reason: HOSPADM

## 2023-08-31 RX ORDER — BALANCED SALT SOLUTION 6.4; .75; .48; .3; 3.9; 1.7 MG/ML; MG/ML; MG/ML; MG/ML; MG/ML; MG/ML
SOLUTION OPHTHALMIC AS NEEDED
Status: DISCONTINUED | OUTPATIENT
Start: 2023-08-31 | End: 2023-08-31 | Stop reason: HOSPADM

## 2023-08-31 RX ORDER — LIDOCAINE HYDROCHLORIDE 20 MG/ML
INJECTION, SOLUTION INTRAVENOUS AS NEEDED
Status: DISCONTINUED | OUTPATIENT
Start: 2023-08-31 | End: 2023-08-31 | Stop reason: SURG

## 2023-08-31 RX ORDER — CYCLOPENT/TROPIC/PHEN/KETR/WAT 1%-1%-2.5%
1 DROPS (EA) OPHTHALMIC (EYE)
Status: COMPLETED | OUTPATIENT
Start: 2023-08-31 | End: 2023-08-31

## 2023-08-31 RX ORDER — NEOMYCIN SULFATE, POLYMYXIN B SULFATE, AND DEXAMETHASONE 3.5; 10000; 1 MG/G; [USP'U]/G; MG/G
OINTMENT OPHTHALMIC AS NEEDED
Status: DISCONTINUED | OUTPATIENT
Start: 2023-08-31 | End: 2023-08-31 | Stop reason: HOSPADM

## 2023-08-31 RX ORDER — SODIUM CHLORIDE 0.9 % (FLUSH) 0.9 %
1-10 SYRINGE (ML) INJECTION AS NEEDED
Status: DISCONTINUED | OUTPATIENT
Start: 2023-08-31 | End: 2023-08-31 | Stop reason: HOSPADM

## 2023-08-31 RX ORDER — PREDNISOLONE ACETATE 10 MG/ML
1 SUSPENSION/ DROPS OPHTHALMIC SEE ADMIN INSTRUCTIONS
Status: DISCONTINUED | OUTPATIENT
Start: 2023-08-31 | End: 2023-08-31 | Stop reason: HOSPADM

## 2023-08-31 RX ORDER — ACETAZOLAMIDE 500 MG/1
500 CAPSULE, EXTENDED RELEASE ORAL ONCE
Status: COMPLETED | OUTPATIENT
Start: 2023-08-31 | End: 2023-08-31

## 2023-08-31 RX ORDER — SODIUM CHLORIDE 0.9 % (FLUSH) 0.9 %
10 SYRINGE (ML) INJECTION EVERY 12 HOURS SCHEDULED
Status: DISCONTINUED | OUTPATIENT
Start: 2023-08-31 | End: 2023-08-31 | Stop reason: HOSPADM

## 2023-08-31 RX ORDER — SODIUM CHLORIDE 9 MG/ML
40 INJECTION, SOLUTION INTRAVENOUS AS NEEDED
Status: DISCONTINUED | OUTPATIENT
Start: 2023-08-31 | End: 2023-08-31 | Stop reason: HOSPADM

## 2023-08-31 RX ADMIN — Medication 1 DROP: at 11:11

## 2023-08-31 RX ADMIN — TETRACAINE HYDROCHLORIDE 1 DROP: 5 SOLUTION OPHTHALMIC at 11:04

## 2023-08-31 RX ADMIN — MIDAZOLAM HYDROCHLORIDE 2 MG: 1 INJECTION, SOLUTION INTRAMUSCULAR; INTRAVENOUS at 13:14

## 2023-08-31 RX ADMIN — LIDOCAINE HYDROCHLORIDE 60 MG: 20 INJECTION, SOLUTION INTRAVENOUS at 13:14

## 2023-08-31 RX ADMIN — SODIUM CHLORIDE, POTASSIUM CHLORIDE, SODIUM LACTATE AND CALCIUM CHLORIDE 1000 ML: 600; 310; 30; 20 INJECTION, SOLUTION INTRAVENOUS at 11:11

## 2023-08-31 RX ADMIN — PROPOFOL 20 MG: 10 INJECTION, EMULSION INTRAVENOUS at 13:14

## 2023-08-31 RX ADMIN — Medication 1 DROP: at 11:16

## 2023-08-31 RX ADMIN — Medication 1 DROP: at 11:06

## 2023-08-31 RX ADMIN — TETRACAINE HYDROCHLORIDE 1 DROP: 5 SOLUTION OPHTHALMIC at 11:05

## 2023-08-31 RX ADMIN — KETAMINE HYDROCHLORIDE 10 MG: 50 INJECTION, SOLUTION INTRAMUSCULAR; INTRAVENOUS at 13:14

## 2023-08-31 RX ADMIN — ACETAZOLAMIDE 500 MG: 500 CAPSULE, EXTENDED RELEASE ORAL at 13:42

## 2023-08-31 RX ADMIN — PROPOFOL 20 MG: 10 INJECTION, EMULSION INTRAVENOUS at 13:22

## 2023-08-31 NOTE — DISCHARGE INSTRUCTIONS
Post Operative Cataract Instructions     Right Eye  POST OPERATIVE INSTRUCTIONS  You have received anesthesia today. DO NOT drive, drink alcohol, sign legal documents.   After surgery, your eye will not hurt. It may feel scratchy, sticky or uncomfortable. Your eye will be sensitive to light.  Most people see better 1-3 days after the procedure, but it could take 3 weeks to get the full benefits and reach your visual potential. If your vision is blurry for a few days it is normal, and means you may have swelling outside or inside the eye. For some patients, a bubble is placed and there will be blurriness.   You should receive a post-op kit with tape and an eye shield. Wear the shield for the first 3 nights after surgery to keep you from rubbing the eye.  You may wear glasses or sunglasses during the day.  You must keep eye protected at all times.  Most people are able to return to their normal routine 1-3 days after surgery, however, due to the brain adjusting to your new vision you may have trouble judging distances and want to be careful when driving and going up and downstairs.   You can shower and wash your hair the day after surgery. Keep water, shampoo, hair spray and shaving lotion out of the eye, especially for the first week.   If eyes become stuck together after surgery you may soak with warm cloth.  During the first week, you should AVOID:   Rubbing or putting pressure on your eye.  Eye make-up, face cream or lotion, hair coloring or perms  Strenuous activities, such as running or lifting weights, as to avoid sweat from getting in the eye. Avoid swimming, hot tubs, fumes or renea conditions.   Sleeping on operative side.  Excessive sneezing or coughing.  Hanging the head down for periods of time. Keep your head above your heart.    Some discomfort and blurred vision after surgery are normal, but if you have any unusual pain, swelling, bleeding or sudden decrease in vision, contact our office immediately.      Emergency assistance is available at any time by calling:    Dr.Mark Solomon Carbajal  [phone]    If unable to reach call [facility] @ [phone]    You have been prescribed an eye drop to use after surgery, please follow these instructions and bring eye drops and instructions with you to post op appointment:    Prednisolone Acetate 1% (Pred Forte')  Shake well before use.    USE 1 DROP 4 (FOUR) TIMES A DAY FOR 1 WEEK THEN STARTING WEEK 2, ONLY USE 2 (TWO) TIMES A DAY UNTIL YOU RUN OUT OF DROPS.     PLACE A CARLITO IN THE DAY COLUMN EACH TIME YOU USE TO KEEP ON SCHEDULE    WEEK 1-USE 4  (FOUR) TIMES A DAY DAY 1  []   []    []   []     DAY 2  []   []    []   []  DAY 3  []   []    []   []  DAY 4  []   []    []   []  DAY 5  []   []    []   []  DAY 6  []   []    []   []  DAY 7  []   []    []   []      WEEK 2-USE 2 (TWO)  TIMES A DAY DAY 1  []   []  DAY 2  []   []  DAY 3  []   []  DAY 4  []   []  DAY 5  []   []  DAY 6  []   []  DAY 7  []   []      WEEK 3-USE 2 (TWO) TIMES A DAY DAY 1  []   []  DAY 2  []   []  DAY 3  []   []  DAY 4  []   []  DAY 5  []   []  DAY 6  []   []  DAY 7  []   []      WEEK 4-USE 2 (TWO)TIMES A DAY DAY 1  []   []  DAY 2  []   []  DAY 3  []   []  DAY 4  []   []  DAY 5  []   []  DAY 6  []   []  DAY 7  []   []      Please follow all post op instructions and follow up appointment time from your physician's office included in your discharge packet.  . No pushing, pulling, tugging,  heavy lifting, or strenuous activity.  No major decision making, driving, or drinking alcoholic beverages for 24 hours. ( due to the medications you have  received)  Always use good hand hygiene/washing techniques.  NO driving while taking pain medications.    * if you have an incision:  Check your incision area every day for signs of infection.   Check for:  * more redness, swelling, or pain  *more fluid or blood  *warmth  *pus or bad  smell    To assist you in voiding:  Drink plenty of fluids  Listen to running water while attempting to void.    If you are unable to urinate and you have an uncomfortable urge to void or it has been   6 hours since you were discharged, return to the Emergency Room

## 2023-08-31 NOTE — H&P
[facility]         History and Physical    Patient Name: Crystal Ragsdale  MRN: [id number]  : 1950  Gender: female     HPI: Patient complaint of PPLOV Right eye diagnosed with cataract. Patient requests PHACO PCIOL for Increase of VA/ADL.    History:    Past Medical History:   Diagnosis Date    Anxiety     Cancer     Breast Cancer    Hyperlipidemia     Hypertension     Impaired functional mobility, balance, gait, and endurance     Stroke        Past Surgical History:   Procedure Laterality Date    BREAST LUMPECTOMY Right 2000    HYSTERECTOMY         Social History     Socioeconomic History    Marital status:    Tobacco Use    Smoking status: Former     Packs/day: 1.00     Types: Cigarettes    Smokeless tobacco: Never   Substance and Sexual Activity    Alcohol use: No    Drug use: No    Sexual activity: Defer       Family History   Problem Relation Age of Onset    Alzheimer's disease Mother     Cancer Father     No Known Problems Sister     No Known Problems Brother        Prior to Admission Medications:  Medications Prior to Admission   Medication Sig Dispense Refill Last Dose    amLODIPine (NORVASC) 10 MG tablet Take 1 tablet by mouth Daily. 90 tablet 1 2023 at 0800    aspirin 81 MG chewable tablet Chew 1 tablet Daily.   2023 at 0800    carvedilol (COREG) 25 MG tablet Take 1 tablet by mouth 2 (Two) Times a Day With Meals. 180 tablet 1 2023 at 0800    cloNIDine (CATAPRES) 0.1 MG tablet Take 1 tablet by mouth 3 (Three) Times a Day. 270 tablet 1 2023 at 2100    doxazosin (CARDURA) 4 MG tablet Take 1 tablet by mouth Daily. 90 tablet 1 2023 at 0800    glimepiride (Amaryl) 1 MG tablet Take 1 tablet by mouth Every Morning Before Breakfast. 90 tablet 1 2023 at 0800    hydrOXYzine (ATARAX) 25 MG tablet Take 1 tablet by mouth Every 8 (Eight) Hours As Needed for Anxiety. for anxiety. 180 tablet 1 2023 at 0800    losartan (COZAAR) 50 MG tablet  Take 1 tablet by mouth Daily. 90 tablet 1 8/31/2023 at 0800    rosuvastatin (Crestor) 10 MG tablet Take on MWF night only. 90 tablet 1 Past Week       Allergies:  No Known Allergies     Vitals: Temp:  [97.5 °F (36.4 °C)] 97.5 °F (36.4 °C)  Heart Rate:  [70] 70  Resp:  [18] 18  BP: (160)/(83) 160/83    Review of Systems:   Within Normal Limits Abnormal   HEENT [x]    []     Cardiovascular [x]   []     Gastrointestinal [x]   []     Genitourinary [x]   []     Neurologic [x]   []     Pulmonary [x]   []       Physical Exam:   Within Normal Limits Abnormal   HEENT [x]    []     Heart [x]   []     Lungs [x]   []     Abdomen [x]   []     Extremities [x]   []       Impression: Right nuclear sclerotic cataract.     Plan: CATARACT PHACO EXTRACTION WITH INTRAOCULAR LENS IMPLANT RIGHT (Right)     Tristian Carbajal MD  8/31/2023

## 2023-08-31 NOTE — ANESTHESIA POSTPROCEDURE EVALUATION
Patient: Crystal Ragsdale    Procedure Summary       Date: 08/31/23 Room / Location: [facility] OR  /  [facility] OR    Anesthesia Start: 1311 Anesthesia Stop: 1329    Procedure: CATARACT PHACO EXTRACTION WITH INTRAOCULAR LENS IMPLANT RIGHT (Right: Eye) Diagnosis:       Nuclear sclerotic cataract of right eye      (Nuclear sclerotic cataract of right eye [H25.11])    Surgeons: Tristian Carbajal MD Provider: Dayne Bustos CRNA    Anesthesia Type: MAC ASA Status: 3            Anesthesia Type: MAC    Vitals  No vitals data found for the desired time range.          Post Anesthesia Care and Evaluation    Patient location during evaluation: bedside  Patient participation: complete - patient participated  Level of consciousness: awake and alert  Pain score: 0  Pain management: satisfactory to patient    Airway patency: patent  Anesthetic complications: No anesthetic complications  PONV Status: none  Cardiovascular status: acceptable and stable  Respiratory status: acceptable  Hydration status: acceptable    Comments: Vitals signs as noted in nursing documentation as per protocol.

## 2023-08-31 NOTE — ANESTHESIA PREPROCEDURE EVALUATION
Anesthesia Evaluation     Patient summary reviewed and Nursing notes reviewed   NPO Solid Status: > 8 hours  NPO Liquid Status: > 8 hours           Airway   Mallampati: II  TM distance: >3 FB  Neck ROM: full  Possible difficult intubation  Dental - normal exam     Pulmonary    (+) a smoker Former,decreased breath sounds  Cardiovascular - normal exam    Patient on routine beta blocker and Beta blocker given within 24 hours of surgery    (+) hypertension, hyperlipidemia      Neuro/Psych  (+) CVA, psychiatric history Anxiety  GI/Hepatic/Renal/Endo - negative ROS     Musculoskeletal     Abdominal   (+) obese   Substance History - negative use     OB/GYN negative ob/gyn ROS         Other   arthritis,   history of cancer (breast)                  Anesthesia Plan    ASA 3     MAC     (Risks and benefits discussed including risk of aspiration, recall and dental damage. All patient questions answered.    Will continue with plan of care.)  intravenous induction     Anesthetic plan, risks, benefits, and alternatives have been provided, discussed and informed consent has been obtained with: patient.  Pre-procedure education provided    CODE STATUS:

## 2023-08-31 NOTE — OP NOTE
OPERATIVE NOTE    Date of Procedure: 8/31/2023  Patient Name: Crystal Ragsdale  Patient MRN: [id number]  YOB: 1950     Preoperative Diagnosis: Right nuclear sclerotic cataract.     Postoperative Diagnosis: Right nuclear sclerotic cataract.     Procedure Performed: Phacoemulsification with implantation of a  foldable posterior chamber intraocular lens, Right eye.     Surgeon: Tristian Carbajal MD     Anesthesia:  Monitored Anesthesia Care (MAC)      Brief History and Indication: The patient presents with a history of past progressive loss of vision.  Patient was diagnosed with cataract and requests removal for increased ability to read and see.     Operation Description: The patient was taken to the OR and prepped and draped in the usual sterile ophthalmic fashion. A lid speculum was placed in the eye.  A #75 blade was then used to make a stab incision two o’clock hours from the intended temporal clear cornea groove. The anterior chamber was then inflated with a Viscoelastic. A metal microkeratome blade was then used to enter the anterior chamber at the temporal clear cornea site. A three level tunnel incision was made. A curvilinear capsulorrhexis was then performed with a bent cystotome needle and capsulorrhexis forceps.  BSS on a 30 gauge bent cannula was used to hydro-dissect, and hydro-delineate the lens. Good fluid waves and hydro-delineation were noted. Phacoemulsification was then used to remove nuclear material without complications. The residual cortical and lenticular material was then removed with irrigation and aspiration. Viscoelastics were then used to inflate the bag in a soft shell technique. A PCIOL was injected into the bag. Post-implantation, there were no rents or tears in the bag and the lens was noted to be stable and centered. The residual Viscoelastic was then removed with irrigation and aspiration.  The wound was checked and found to be without leaks. Therefore a Polydex  ointment and one drop of Durezol eye drop was placed in the eye.     Implant Information:   Implant Name Type Inv. Item Serial No.  Lot No. LRB No. Used Action   LENS MONOFOCAL IQ [id number] - [id number] - [id number] Implant LENS MONOFOCAL IQ [id number] [employer]  Right 1 Implanted       Complications: None    Estimated Blood Loss:  Less than 1 cc.       []   Changed to complex procedure due to: []  hypermature, white cataract. Blue dye was used. []   small iris. A Malyugin Ring was used.    Discharge and Condition  The patient was transported to same day surgery in excellent condition and scheduled for follow-up tomorrow morning. The patient was given instructions on use of eye drops for the operative eye and was specifically instructed to call Dr. Carbajal at his office or home for any nausea, vomiting, headache, decreased visual acuity, or pain, or if the patient had any general concerns.    Tristian Carbajal MD  8/31/2023

## 2023-09-05 ENCOUNTER — PREP FOR SURGERY (OUTPATIENT)
Dept: OTHER | Facility: HOSPITAL | Age: 73
End: 2023-09-05
Payer: MEDICARE

## 2023-09-05 DIAGNOSIS — H25.12 NUCLEAR SCLEROTIC CATARACT OF LEFT EYE: Primary | ICD-10-CM

## 2023-09-05 RX ORDER — PREDNISOLONE ACETATE 10 MG/ML
1 SUSPENSION/ DROPS OPHTHALMIC SEE ADMIN INSTRUCTIONS
OUTPATIENT
Start: 2023-09-05

## 2023-09-05 RX ORDER — TETRACAINE HYDROCHLORIDE 5 MG/ML
1 SOLUTION OPHTHALMIC SEE ADMIN INSTRUCTIONS
OUTPATIENT
Start: 2023-09-05

## 2023-09-05 RX ORDER — CYCLOPENT/TROPIC/PHEN/KETR/WAT 1%-1%-2.5%
1 DROPS (EA) OPHTHALMIC (EYE)
OUTPATIENT
Start: 2023-09-05 | End: 2023-09-05

## 2023-09-05 RX ORDER — SODIUM CHLORIDE 9 MG/ML
40 INJECTION, SOLUTION INTRAVENOUS AS NEEDED
OUTPATIENT
Start: 2023-09-05

## 2023-09-05 RX ORDER — SODIUM CHLORIDE 0.9 % (FLUSH) 0.9 %
1-10 SYRINGE (ML) INJECTION AS NEEDED
OUTPATIENT
Start: 2023-09-05

## 2023-09-05 RX ORDER — SODIUM CHLORIDE 0.9 % (FLUSH) 0.9 %
10 SYRINGE (ML) INJECTION EVERY 12 HOURS SCHEDULED
OUTPATIENT
Start: 2023-09-05

## 2023-09-12 NOTE — PRE-PROCEDURE INSTRUCTIONS
PAT phone call completed for upcoming procedure with Dr. CARLITO DARBY. PLEASE BRING A PICTURE ID AND INSURANCE CARD THE DAY OF PROCEDURE.  FOLLOW  ANY PRE-OP INSTRUCTIONS GIVEN BY YOUR DOCTOR.  Pt verbalized understanding.  PAT phone history completed with pt for upcoming procedure on      PAT PASS GIVEN/REVIEWED WITH PT.  VERBALIZED UNDERSTANDING OF THE FOLLOWING:  DO NOT EAT, DRINK, SMOKE, USE SMOKELESS TOBACCO OR CHEW GUM AFTER MIDNIGHT THE NIGHT BEFORE SURGERY.  THIS ALSO INCLUDES HARD CANDIES AND MINTS.    DO NOT SHAVE THE AREA TO BE OPERATED ON AT LEAST 48 HOURS PRIOR TO THE PROCEDURE.  DO NOT WEAR MAKE UP OR NAIL POLISH.  DO NOT LEAVE IN ANY PIERCING OR WEAR JEWELRY THE DAY OF SURGERY.      DO NOT USE ADHESIVES IF YOU WEAR DENTURES.    DO NOT WEAR EYE CONTACTS; BRING IN YOUR GLASSES.    ONLY TAKE MEDICATION THE MORNING OF YOUR PROCEDURE IF INSTRUCTED BY YOUR SURGEON WITH ENOUGH WATER TO SWALLOW THE MEDICATION.  IF YOUR SURGEON DID NOT SPECIFY WHICH MEDICATIONS TO TAKE, YOU WILL NEED TO CALL THEIR OFFICE FOR FURTHER INSTRUCTIONS AND DO AS THEY INSTRUCT.    LEAVE ANYTHING YOU CONSIDER VALUABLE AT HOME.    YOU WILL NEED TO ARRANGE FOR SOMEONE TO DRIVE YOU HOME AFTER SURGERY.  IT IS RECOMMENDED THAT YOU DO NOT DRIVE, WORK, DRINK ALCOHOL OR MAKE MAJOR DECISIONS FOR AT LEAST 24 HOURS AFTER YOUR PROCEDURE IS COMPLETE.      THE DAY OF YOUR PROCEDURE, BRING IN THE FOLLOWING IF APPLICABLE:   PICTURE ID AND INSURANCE/MEDICARE OR MEDICAID CARDS/ANY CO-PAY THAT MAY BE DUE   COPY OF ADVANCED DIRECTIVE/LIVING WILL/POWER OR    CPAP/BIPAP/INHALERS   SKIN PREP SHEET   YOUR PREADMISSION TESTING PASS (IF NOT A PHONE HISTORY)    Medication instructions given to pt by RN per anesthesia protocol.  Pt referred back to surgeon for further instructions if he/she is on any blood thinners.

## 2023-09-14 ENCOUNTER — HOSPITAL ENCOUNTER (OUTPATIENT)
Facility: HOSPITAL | Age: 73
Setting detail: HOSPITAL OUTPATIENT SURGERY
Discharge: HOME | End: 2023-09-14
Attending: OPHTHALMOLOGY | Admitting: OPHTHALMOLOGY
Payer: MEDICARE

## 2023-09-14 ENCOUNTER — ANESTHESIA EVENT (OUTPATIENT)
Dept: PERIOP | Facility: HOSPITAL | Age: 73
End: 2023-09-14
Payer: MEDICARE

## 2023-09-14 ENCOUNTER — ANESTHESIA (OUTPATIENT)
Dept: PERIOP | Facility: HOSPITAL | Age: 73
End: 2023-09-14
Payer: MEDICARE

## 2023-09-14 VITALS
TEMPERATURE: 98 F | DIASTOLIC BLOOD PRESSURE: 85 MMHG | OXYGEN SATURATION: 94 % | RESPIRATION RATE: 16 BRPM | HEART RATE: 66 BPM | SYSTOLIC BLOOD PRESSURE: 156 MMHG

## 2023-09-14 DIAGNOSIS — H25.12 NUCLEAR SCLEROTIC CATARACT OF LEFT EYE: ICD-10-CM

## 2023-09-14 LAB — GLUCOSE BLDC GLUCOMTR-MCNC: 156 MG/DL (ref 70–130)

## 2023-09-14 PROCEDURE — V2632 POST CHMBR INTRAOCULAR LENS: HCPCS | Performed by: OPHTHALMOLOGY

## 2023-09-14 PROCEDURE — 82948 REAGENT STRIP/BLOOD GLUCOSE: CPT

## 2023-09-14 PROCEDURE — 25010000002 PROPOFOL 10 MG/ML EMULSION: Performed by: NURSE ANESTHETIST, CERTIFIED REGISTERED

## 2023-09-14 DEVICE — LENS MONOFOCAL IQ CC60WF230: Type: IMPLANTABLE DEVICE | Site: POSTERIOR CHAMBER | Status: FUNCTIONAL

## 2023-09-14 RX ORDER — PREDNISOLONE ACETATE 10 MG/ML
1 SUSPENSION/ DROPS OPHTHALMIC SEE ADMIN INSTRUCTIONS
Status: DISCONTINUED | OUTPATIENT
Start: 2023-09-14 | End: 2023-09-14 | Stop reason: HOSPADM

## 2023-09-14 RX ORDER — PREDNISOLONE ACETATE 10 MG/ML
SUSPENSION/ DROPS OPHTHALMIC AS NEEDED
Status: DISCONTINUED | OUTPATIENT
Start: 2023-09-14 | End: 2023-09-14 | Stop reason: HOSPADM

## 2023-09-14 RX ORDER — KETAMINE HCL IN NACL, ISO-OSM 100MG/10ML
SYRINGE (ML) INJECTION AS NEEDED
Status: DISCONTINUED | OUTPATIENT
Start: 2023-09-14 | End: 2023-09-14 | Stop reason: SURG

## 2023-09-14 RX ORDER — SODIUM CHLORIDE 0.9 % (FLUSH) 0.9 %
1-10 SYRINGE (ML) INJECTION AS NEEDED
Status: DISCONTINUED | OUTPATIENT
Start: 2023-09-14 | End: 2023-09-14 | Stop reason: HOSPADM

## 2023-09-14 RX ORDER — PREDNISOLONE ACETATE 10 MG/ML
1 SUSPENSION/ DROPS OPHTHALMIC 4 TIMES DAILY
Start: 2023-09-14

## 2023-09-14 RX ORDER — CYCLOPENT/TROPIC/PHEN/KETR/WAT 1%-1%-2.5%
1 DROPS (EA) OPHTHALMIC (EYE)
Status: COMPLETED | OUTPATIENT
Start: 2023-09-14 | End: 2023-09-14

## 2023-09-14 RX ORDER — LIDOCAINE HYDROCHLORIDE 20 MG/ML
INJECTION, SOLUTION INTRAVENOUS AS NEEDED
Status: DISCONTINUED | OUTPATIENT
Start: 2023-09-14 | End: 2023-09-14 | Stop reason: SURG

## 2023-09-14 RX ORDER — SODIUM CHLORIDE 0.9 % (FLUSH) 0.9 %
10 SYRINGE (ML) INJECTION EVERY 12 HOURS SCHEDULED
Status: DISCONTINUED | OUTPATIENT
Start: 2023-09-14 | End: 2023-09-14 | Stop reason: HOSPADM

## 2023-09-14 RX ORDER — LIDOCAINE HYDROCHLORIDE 40 MG/ML
INJECTION, SOLUTION RETROBULBAR; TOPICAL AS NEEDED
Status: DISCONTINUED | OUTPATIENT
Start: 2023-09-14 | End: 2023-09-14 | Stop reason: HOSPADM

## 2023-09-14 RX ORDER — TETRACAINE HYDROCHLORIDE 5 MG/ML
1 SOLUTION OPHTHALMIC SEE ADMIN INSTRUCTIONS
Status: COMPLETED | OUTPATIENT
Start: 2023-09-14 | End: 2023-09-14

## 2023-09-14 RX ORDER — ACETAZOLAMIDE 500 MG/1
500 CAPSULE, EXTENDED RELEASE ORAL ONCE
Status: COMPLETED | OUTPATIENT
Start: 2023-09-14 | End: 2023-09-14

## 2023-09-14 RX ORDER — TETRACAINE HYDROCHLORIDE 5 MG/ML
SOLUTION OPHTHALMIC AS NEEDED
Status: DISCONTINUED | OUTPATIENT
Start: 2023-09-14 | End: 2023-09-14 | Stop reason: HOSPADM

## 2023-09-14 RX ORDER — BALANCED SALT SOLUTION 6.4; .75; .48; .3; 3.9; 1.7 MG/ML; MG/ML; MG/ML; MG/ML; MG/ML; MG/ML
SOLUTION OPHTHALMIC AS NEEDED
Status: DISCONTINUED | OUTPATIENT
Start: 2023-09-14 | End: 2023-09-14 | Stop reason: HOSPADM

## 2023-09-14 RX ORDER — SODIUM CHLORIDE 9 MG/ML
40 INJECTION, SOLUTION INTRAVENOUS AS NEEDED
Status: DISCONTINUED | OUTPATIENT
Start: 2023-09-14 | End: 2023-09-14 | Stop reason: HOSPADM

## 2023-09-14 RX ORDER — NEOMYCIN SULFATE, POLYMYXIN B SULFATE, AND DEXAMETHASONE 3.5; 10000; 1 MG/G; [USP'U]/G; MG/G
OINTMENT OPHTHALMIC AS NEEDED
Status: DISCONTINUED | OUTPATIENT
Start: 2023-09-14 | End: 2023-09-14 | Stop reason: HOSPADM

## 2023-09-14 RX ORDER — SODIUM CHLORIDE, SODIUM LACTATE, POTASSIUM CHLORIDE, CALCIUM CHLORIDE 600; 310; 30; 20 MG/100ML; MG/100ML; MG/100ML; MG/100ML
1000 INJECTION, SOLUTION INTRAVENOUS CONTINUOUS
Status: DISCONTINUED | OUTPATIENT
Start: 2023-09-14 | End: 2023-09-14 | Stop reason: HOSPADM

## 2023-09-14 RX ADMIN — SODIUM CHLORIDE, POTASSIUM CHLORIDE, SODIUM LACTATE AND CALCIUM CHLORIDE 1000 ML: 600; 310; 30; 20 INJECTION, SOLUTION INTRAVENOUS at 10:16

## 2023-09-14 RX ADMIN — Medication 1 DROP: at 10:12

## 2023-09-14 RX ADMIN — LIDOCAINE HYDROCHLORIDE 40 MG: 20 INJECTION, SOLUTION INTRAVENOUS at 10:45

## 2023-09-14 RX ADMIN — Medication 1 DROP: at 10:22

## 2023-09-14 RX ADMIN — ACETAZOLAMIDE 500 MG: 500 CAPSULE, EXTENDED RELEASE ORAL at 11:08

## 2023-09-14 RX ADMIN — PROPOFOL 150 MCG/KG/MIN: 10 INJECTION, EMULSION INTRAVENOUS at 10:45

## 2023-09-14 RX ADMIN — TETRACAINE HYDROCHLORIDE 1 DROP: 5 SOLUTION OPHTHALMIC at 10:11

## 2023-09-14 RX ADMIN — Medication 1 DROP: at 10:17

## 2023-09-14 RX ADMIN — TETRACAINE HYDROCHLORIDE 1 DROP: 5 SOLUTION OPHTHALMIC at 10:10

## 2023-09-14 RX ADMIN — Medication 10 MG: at 10:45

## 2023-09-14 NOTE — ANESTHESIA POSTPROCEDURE EVALUATION
Patient: Crystal Ragsdale    Procedure Summary       Date: 09/14/23 Room / Location: [facility] OR  /  [facility] OR    Anesthesia Start: 1040 Anesthesia Stop: 1053    Procedure: CATARACT PHACO EXTRACTION WITH INTRAOCULAR LENS IMPLANT LEFT (Left: Eye) Diagnosis:       Nuclear sclerotic cataract of left eye      (Nuclear sclerotic cataract of left eye [H25.12])    Surgeons: Tristian Carbajal MD Provider: Christopher Sanford CRNA    Anesthesia Type: MAC ASA Status: 3            Anesthesia Type: MAC    Vitals  No vitals data found for the desired time range.          Post Anesthesia Care and Evaluation    Patient location during evaluation: PHASE II  Patient participation: complete - patient participated  Level of consciousness: awake and alert  Pain score: 1  Pain management: satisfactory to patient    Airway patency: patent  Anesthetic complications: No anesthetic complications  PONV Status: none  Cardiovascular status: acceptable and stable  Respiratory status: acceptable and spontaneous ventilation  Hydration status: acceptable    Comments: Vitals signs as noted in nursing documentation as per protocol.

## 2023-09-14 NOTE — H&P
[facility] [place]         History and Physical    Patient Name: Crystal Ragsdale  MRN: [id number]  : 1950  Gender: female     HPI: Patient complaint of PPLOV Left eye diagnosed with cataract. Patient requests PHACO PCIOL for Increase of VA/ADL.    History:    Past Medical History:   Diagnosis Date    Anxiety     Cancer     Breast Cancer    Hyperlipidemia     Hypertension     Impaired functional mobility, balance, gait, and endurance     Stroke        Past Surgical History:   Procedure Laterality Date    BREAST LUMPECTOMY Right     CATARACT EXTRACTION W/ INTRAOCULAR LENS IMPLANT Right 2023    Procedure: CATARACT PHACO EXTRACTION WITH INTRAOCULAR LENS IMPLANT RIGHT;  Surgeon: Tristian Carbajal MD;  Location: [facility];  Service: Ophthalmology;  Laterality: Right;    HYSTERECTOMY         Social History     Socioeconomic History    Marital status:    Tobacco Use    Smoking status: Former     Packs/day: 1.00     Types: Cigarettes    Smokeless tobacco: Never   Substance and Sexual Activity    Alcohol use: No    Drug use: No    Sexual activity: Defer       Family History   Problem Relation Age of Onset    Alzheimer's disease Mother     Cancer Father     No Known Problems Sister     No Known Problems Brother        Prior to Admission Medications:  Medications Prior to Admission   Medication Sig Dispense Refill Last Dose    amLODIPine (NORVASC) 10 MG tablet Take 1 tablet by mouth Daily. 90 tablet 1 2023    aspirin 81 MG chewable tablet Chew 1 tablet Daily.   2023    carvedilol (COREG) 25 MG tablet Take 1 tablet by mouth 2 (Two) Times a Day With Meals. 180 tablet 1 2023    cloNIDine (CATAPRES) 0.1 MG tablet Take 1 tablet by mouth 3 (Three) Times a Day. 270 tablet 1 2023    doxazosin (CARDURA) 4 MG tablet Take 1 tablet by mouth Daily. 90 tablet 1 2023    glimepiride (Amaryl) 1 MG tablet Take 1 tablet by mouth Every Morning Before Breakfast. 90 tablet 1  9/13/2023    hydrOXYzine (ATARAX) 25 MG tablet Take 1 tablet by mouth Every 8 (Eight) Hours As Needed for Anxiety. for anxiety. 180 tablet 1 9/14/2023    losartan (COZAAR) 50 MG tablet Take 1 tablet by mouth Daily. 90 tablet 1 9/14/2023    prednisoLONE acetate (Pred Forte) 1 % ophthalmic suspension Administer 1 drop to the right eye 4 (Four) Times a Day.   9/13/2023    rosuvastatin (Crestor) 10 MG tablet Take on MWF night only. 90 tablet 1 9/13/2023       Allergies:  No Known Allergies     Vitals: Temp:  [97.4 °F (36.3 °C)] 97.4 °F (36.3 °C)  Heart Rate:  [53] 53  Resp:  [18] 18  BP: (156)/(88) 156/88    Review of Systems:   Within Normal Limits Abnormal   HEENT [x]    []     Cardiovascular [x]   []     Gastrointestinal [x]   []     Genitourinary [x]   []     Neurologic [x]   []     Pulmonary [x]   []       Physical Exam:   Within Normal Limits Abnormal   HEENT [x]    []     Heart [x]   []     Lungs [x]   []     Abdomen [x]   []     Extremities [x]   []       Impression: Left nuclear sclerotic cataract.     Plan: CATARACT PHACO EXTRACTION WITH INTRAOCULAR LENS IMPLANT LEFT (Left)     Tristian Carbajal MD  9/14/2023

## 2023-09-14 NOTE — OP NOTE
OPERATIVE NOTE    Date of Procedure: 9/14/2023  Patient Name: Crystal Ragsdale  Patient MRN: [id number]  YOB: 1950     Preoperative Diagnosis: Left nuclear sclerotic cataract.     Postoperative Diagnosis: Left nuclear sclerotic cataract.     Procedure Performed: Phacoemulsification with implantation of a  foldable posterior chamber intraocular lens, Left eye.     Surgeon: Tristian Carbajal MD    Anesthesia:  Monitored Anesthesia Care (MAC)      Brief History and Indication: The patient presents with a history of past progressive loss of vision.  Patient was diagnosed with cataract and requests removal for increased ability to read and see.     Operation Description: The patient was taken to the OR and prepped and draped in the usual sterile ophthalmic fashion. A lid speculum was placed in the eye.  A #75 blade was then used to make a stab incision two o’clock hours from the intended temporal clear cornea groove. The anterior chamber was then inflated with a Viscoelastic. A metal microkeratome blade was then used to enter the anterior chamber at the temporal clear cornea site. A three level tunnel incision was made. A curvilinear capsulorrhexis was then performed with a bent cystotome needle and capsulorrhexis forceps.  BSS on a 30 gauge bent cannula was used to hydro-dissect, and hydro-delineate the lens. Good fluid waves and hydro-delineation were noted. Phacoemulsification was then used to remove nuclear material without complications. The residual cortical and lenticular material was then removed with irrigation and aspiration. Viscoelastics were then used to inflate the bag in a soft shell technique. A PCIOL was injected into the bag. Post-implantation, there were no rents or tears in the bag and the lens was noted to be stable and centered. The residual Viscoelastic was then removed with irrigation and aspiration.  The wound was checked and found to be without leaks. Therefore a Polydex ointment and one  drop of Durezol eye drop was placed in the eye.     Implant Information:   Implant Name Type Inv. Item Serial No.  Lot No. LRB No. Used Action   LENS MONOFOCAL IQ [id number] - [id number] - [id number] Implant LENS MONOFOCAL IQ [id number] [employer]  Left 1 Implanted       Complications: None    Estimated Blood Loss:  Less than 1 cc.       []   Changed to complex procedure due to: []  hypermature, white cataract. Blue dye was used. []   small iris. A Malyugin Ring was used.    Discharge and Condition  The patient was transported to same day surgery in excellent condition and scheduled for follow-up tomorrow morning. The patient was given instructions on use of eye drops for the operative eye and was specifically instructed to call Dr. Carbajal at his office or home for any nausea, vomiting, headache, decreased visual acuity, or pain, or if the patient had any general concerns.    Tristian Carbajal MD  9/14/2023

## 2023-09-14 NOTE — ANESTHESIA PREPROCEDURE EVALUATION
Anesthesia Evaluation     Patient summary reviewed and Nursing notes reviewed   NPO Solid Status: > 8 hours  NPO Liquid Status: > 8 hours           Airway   Mallampati: II  TM distance: >3 FB  Neck ROM: full  Possible difficult intubation  Dental - normal exam     Pulmonary - negative pulmonary ROS   (+) Former,decreased breath sounds  Cardiovascular - normal exam  Exercise tolerance: good (4-7 METS)    Patient on routine beta blocker and Beta blocker given within 24 hours of surgery    (+) hypertension, hyperlipidemia      Neuro/Psych  (+) CVA, psychiatric history Anxiety  GI/Hepatic/Renal/Endo - negative ROS     Musculoskeletal     Abdominal   (+) obese   Substance History - negative use     OB/GYN negative ob/gyn ROS         Other   arthritis,   history of cancer (breast)                  Anesthesia Plan    ASA 3     MAC     (Risks and benefits discussed including risk of aspiration, recall and dental damage. All patient questions answered.    Will continue with plan of care.)  intravenous induction     Anesthetic plan, risks, benefits, and alternatives have been provided, discussed and informed consent has been obtained with: patient.  Pre-procedure education provided    CODE STATUS:

## 2023-09-14 NOTE — DISCHARGE INSTRUCTIONS
Post Operative Cataract Instructions     Left Eye  POST OPERATIVE INSTRUCTIONS  You have received anesthesia today. DO NOT drive, drink alcohol, sign legal documents.   After surgery, your eye will not hurt. It may feel scratchy, sticky or uncomfortable. Your eye will be sensitive to light.  Most people see better 1-3 days after the procedure, but it could take 3 weeks to get the full benefits and reach your visual potential. If your vision is blurry for a few days it is normal, and means you may have swelling outside or inside the eye. For some patients, a bubble is placed and there will be blurriness.   You should receive a post-op kit with tape and an eye shield. Wear the shield for the first 3 nights after surgery to keep you from rubbing the eye.  You may wear glasses or sunglasses during the day.  You must keep eye protected at all times.  Most people are able to return to their normal routine 1-3 days after surgery, however, due to the brain adjusting to your new vision you may have trouble judging distances and want to be careful when driving and going up and downstairs.   You can shower and wash your hair the day after surgery. Keep water, shampoo, hair spray and shaving lotion out of the eye, especially for the first week.   If eyes become stuck together after surgery you may soak with warm cloth.  During the first week, you should AVOID:   Rubbing or putting pressure on your eye.  Eye make-up, face cream or lotion, hair coloring or perms  Strenuous activities, such as running or lifting weights, as to avoid sweat from getting in the eye. Avoid swimming, hot tubs, fumes or renea conditions.   Sleeping on operative side.  Excessive sneezing or coughing.  Hanging the head down for periods of time. Keep your head above your heart.    Some discomfort and blurred vision after surgery are normal, but if you have any unusual pain, swelling, bleeding or sudden decrease in vision, contact our office immediately.      Emergency assistance is available at any time by calling:    Dr.Mark Solomon Carbajal  [phone]    If unable to reach call [facility] @ [phone]    You have been prescribed an eye drop to use after surgery, please follow these instructions and bring eye drops and instructions with you to post op appointment:    Prednisolone Acetate 1% (Pred Forte')  Shake well before use.    USE 1 DROP 4 (FOUR) TIMES A DAY FOR 1 WEEK THEN STARTING WEEK 2, ONLY USE 2 (TWO) TIMES A DAY UNTIL YOU RUN OUT OF DROPS.     PLACE A CARLITO IN THE DAY COLUMN EACH TIME YOU USE TO KEEP ON SCHEDULE    WEEK 1-USE 4  (FOUR) TIMES A DAY DAY 1  []   []    []   []     DAY 2  []   []    []   []  DAY 3  []   []    []   []  DAY 4  []   []    []   []  DAY 5  []   []    []   []  DAY 6  []   []    []   []  DAY 7  []   []    []   []      WEEK 2-USE 2 (TWO)  TIMES A DAY DAY 1  []   []  DAY 2  []   []  DAY 3  []   []  DAY 4  []   []  DAY 5  []   []  DAY 6  []   []  DAY 7  []   []      WEEK 3-USE 2 (TWO) TIMES A DAY DAY 1  []   []  DAY 2  []   []  DAY 3  []   []  DAY 4  []   []  DAY 5  []   []  DAY 6  []   []  DAY 7  []   []      WEEK 4-USE 2 (TWO)TIMES A DAY DAY 1  []   []  DAY 2  []   []  DAY 3  []   []  DAY 4  []   []  DAY 5  []   []  DAY 6  []   []  DAY 7  []   []      No pushing, pulling, tugging,  heavy lifting, or strenuous activity.  No major decision making, driving, or drinking alcoholic beverages for 24 hours. ( due to the medications you have  received)  Always use good hand hygiene/washing techniques.  NO driving while taking pain medications.    * if you have an incision:  Check your incision area every day for signs of infection.   Check for:  * more redness, swelling, or pain  *more fluid or blood  *warmth  *pus or bad smellTo assist you in voiding:  Drink plenty of fluids  Listen to running water while attempting to void.    If you are unable to urinate and you  have an uncomfortable urge to void or it has been   6 hours since you were discharged, return to the Emergency Room

## 2025-05-30 ENCOUNTER — TRANSCRIBE ORDERS (OUTPATIENT)
Dept: ADMINISTRATIVE | Facility: HOSPITAL | Age: 75
End: 2025-05-30
Payer: OTHER GOVERNMENT

## 2025-05-30 DIAGNOSIS — M79.604 RIGHT LEG PAIN: Primary | ICD-10-CM

## 2025-05-30 DIAGNOSIS — M79.605 LEFT LEG PAIN: ICD-10-CM

## 2025-06-03 ENCOUNTER — TRANSCRIBE ORDERS (OUTPATIENT)
Dept: ADMINISTRATIVE | Facility: HOSPITAL | Age: 75
End: 2025-06-03
Payer: OTHER GOVERNMENT

## 2025-06-03 DIAGNOSIS — M79.605 BILATERAL LEG PAIN: Primary | ICD-10-CM

## 2025-06-03 DIAGNOSIS — M79.604 BILATERAL LEG PAIN: Primary | ICD-10-CM

## 2025-06-16 ENCOUNTER — HOSPITAL ENCOUNTER (OUTPATIENT)
Dept: ULTRASOUND IMAGING | Facility: HOSPITAL | Age: 75
Discharge: HOME | End: 2025-06-16
Payer: OTHER GOVERNMENT

## 2025-06-16 DIAGNOSIS — M79.605 BILATERAL LEG PAIN: ICD-10-CM

## 2025-06-16 DIAGNOSIS — M79.604 BILATERAL LEG PAIN: ICD-10-CM

## 2025-06-20 ENCOUNTER — HOSPITAL ENCOUNTER (OUTPATIENT)
Dept: ULTRASOUND IMAGING | Facility: HOSPITAL | Age: 75
Discharge: HOME | End: 2025-06-20
Admitting: FAMILY MEDICINE
Payer: MEDICARE

## 2025-06-20 DIAGNOSIS — M79.605 BILATERAL LEG PAIN: ICD-10-CM

## 2025-06-20 DIAGNOSIS — M79.604 BILATERAL LEG PAIN: ICD-10-CM

## 2025-06-20 PROCEDURE — 93923 UPR/LXTR ART STDY 3+ LVLS: CPT

## (undated) DEVICE — CANN IRR/INJ AIR ANT CHAMBER 6MM BEND 27G

## (undated) DEVICE — EYE CARE POST OP KIT: Brand: MEDLINE INDUSTRIES, INC.

## (undated) DEVICE — SOL IRRIG H2O 1000ML STRL

## (undated) DEVICE — Device

## (undated) DEVICE — GLV SURG SENSICARE PI LF PF 8 GRN STRL

## (undated) DEVICE — BLD BEAVER MICROSHARP 15D 3MM BLU LF

## (undated) DEVICE — HP CONCL INTREPID COAX I/A CRV .3MM

## (undated) DEVICE — THE MONARCH® "D" CARTRIDGE IS A SINGLE-USE POLYPROPYLENE CARTRIDGE FOR POSTERIOR CHAMBER IOL DELIVERY: Brand: MONARCH® III

## (undated) DEVICE — SYR LUERLOK 5CC

## (undated) DEVICE — CLEARCUT® HP2 SLIT KNIFE INTREPID MICRO-COAXIAL SYSTEM 2.4 DB: Brand: CLEARCUT®; INTREPID